# Patient Record
Sex: FEMALE | Race: WHITE | HISPANIC OR LATINO | Employment: UNEMPLOYED | URBAN - METROPOLITAN AREA
[De-identification: names, ages, dates, MRNs, and addresses within clinical notes are randomized per-mention and may not be internally consistent; named-entity substitution may affect disease eponyms.]

---

## 2017-01-12 DIAGNOSIS — E87.6 HYPOKALEMIA: ICD-10-CM

## 2017-01-12 DIAGNOSIS — I10 ESSENTIAL (PRIMARY) HYPERTENSION: ICD-10-CM

## 2017-01-12 DIAGNOSIS — D50.9 IRON DEFICIENCY ANEMIA: ICD-10-CM

## 2017-01-12 DIAGNOSIS — E11.9 TYPE 2 DIABETES MELLITUS WITHOUT COMPLICATIONS (HCC): ICD-10-CM

## 2017-01-20 ENCOUNTER — TRANSCRIBE ORDERS (OUTPATIENT)
Dept: ADMINISTRATIVE | Facility: HOSPITAL | Age: 52
End: 2017-01-20

## 2017-01-20 DIAGNOSIS — Z12.31 SCREENING MAMMOGRAM FOR HIGH-RISK PATIENT: Primary | ICD-10-CM

## 2017-01-28 ENCOUNTER — APPOINTMENT (OUTPATIENT)
Dept: LAB | Facility: CLINIC | Age: 52
End: 2017-01-28
Payer: COMMERCIAL

## 2017-01-28 DIAGNOSIS — D50.9 IRON DEFICIENCY ANEMIA: ICD-10-CM

## 2017-01-28 DIAGNOSIS — E11.9 TYPE 2 DIABETES MELLITUS WITHOUT COMPLICATIONS (HCC): ICD-10-CM

## 2017-01-28 DIAGNOSIS — E87.6 HYPOKALEMIA: ICD-10-CM

## 2017-01-28 DIAGNOSIS — E55.9 VITAMIN D DEFICIENCY: ICD-10-CM

## 2017-01-28 DIAGNOSIS — I10 ESSENTIAL (PRIMARY) HYPERTENSION: ICD-10-CM

## 2017-01-28 LAB
25(OH)D3 SERPL-MCNC: 25 NG/ML (ref 30–100)
ALBUMIN SERPL BCP-MCNC: 3.3 G/DL (ref 3.5–5)
ALP SERPL-CCNC: 70 U/L (ref 46–116)
ALT SERPL W P-5'-P-CCNC: 25 U/L (ref 12–78)
ANION GAP SERPL CALCULATED.3IONS-SCNC: 10 MMOL/L (ref 4–13)
AST SERPL W P-5'-P-CCNC: 14 U/L (ref 5–45)
BILIRUB SERPL-MCNC: 0.2 MG/DL (ref 0.2–1)
BUN SERPL-MCNC: 12 MG/DL (ref 5–25)
CALCIUM SERPL-MCNC: 8.8 MG/DL (ref 8.3–10.1)
CHLORIDE SERPL-SCNC: 101 MMOL/L (ref 100–108)
CO2 SERPL-SCNC: 27 MMOL/L (ref 21–32)
CREAT SERPL-MCNC: 0.61 MG/DL (ref 0.6–1.3)
CREAT UR-MCNC: 306 MG/DL
EST. AVERAGE GLUCOSE BLD GHB EST-MCNC: 160 MG/DL
GFR SERPL CREATININE-BSD FRML MDRD: >60 ML/MIN/1.73SQ M
GLUCOSE SERPL-MCNC: 140 MG/DL (ref 65–140)
HBA1C MFR BLD: 7.2 % (ref 4.2–6.3)
MICROALBUMIN UR-MCNC: 28.5 MG/L (ref 0–20)
MICROALBUMIN/CREAT 24H UR: 9 MG/G CREATININE (ref 0–30)
POTASSIUM SERPL-SCNC: 3.6 MMOL/L (ref 3.5–5.3)
PROT SERPL-MCNC: 7.2 G/DL (ref 6.4–8.2)
SODIUM SERPL-SCNC: 138 MMOL/L (ref 136–145)

## 2017-01-28 PROCEDURE — 82043 UR ALBUMIN QUANTITATIVE: CPT

## 2017-01-28 PROCEDURE — 36415 COLL VENOUS BLD VENIPUNCTURE: CPT

## 2017-01-28 PROCEDURE — 82570 ASSAY OF URINE CREATININE: CPT

## 2017-01-28 PROCEDURE — 82306 VITAMIN D 25 HYDROXY: CPT

## 2017-01-28 PROCEDURE — 80053 COMPREHEN METABOLIC PANEL: CPT

## 2017-01-28 PROCEDURE — 83036 HEMOGLOBIN GLYCOSYLATED A1C: CPT

## 2017-01-30 ENCOUNTER — HOSPITAL ENCOUNTER (OUTPATIENT)
Dept: RADIOLOGY | Age: 52
Discharge: HOME | End: 2017-01-30
Payer: COMMERCIAL

## 2017-01-30 DIAGNOSIS — Z12.31 SCREENING MAMMOGRAM FOR HIGH-RISK PATIENT: ICD-10-CM

## 2017-01-30 PROCEDURE — G0202 SCR MAMMO BI INCL CAD: HCPCS

## 2017-03-01 DIAGNOSIS — E11.9 TYPE 2 DIABETES MELLITUS WITHOUT COMPLICATIONS (HCC): ICD-10-CM

## 2017-03-01 DIAGNOSIS — D50.9 IRON DEFICIENCY ANEMIA: ICD-10-CM

## 2017-03-01 DIAGNOSIS — E87.6 HYPOKALEMIA: ICD-10-CM

## 2017-03-01 DIAGNOSIS — E55.9 VITAMIN D DEFICIENCY: ICD-10-CM

## 2017-03-01 DIAGNOSIS — I10 ESSENTIAL (PRIMARY) HYPERTENSION: ICD-10-CM

## 2017-03-02 ENCOUNTER — ALLSCRIPTS OFFICE VISIT (OUTPATIENT)
Dept: OTHER | Facility: OTHER | Age: 52
End: 2017-03-02

## 2017-04-27 ENCOUNTER — ALLSCRIPTS OFFICE VISIT (OUTPATIENT)
Dept: OTHER | Facility: OTHER | Age: 52
End: 2017-04-27

## 2017-06-01 DIAGNOSIS — E11.65 TYPE 2 DIABETES MELLITUS WITH HYPERGLYCEMIA (HCC): ICD-10-CM

## 2017-06-01 DIAGNOSIS — E55.9 VITAMIN D DEFICIENCY: ICD-10-CM

## 2017-06-01 DIAGNOSIS — E87.6 HYPOKALEMIA: ICD-10-CM

## 2017-06-01 DIAGNOSIS — I10 ESSENTIAL (PRIMARY) HYPERTENSION: ICD-10-CM

## 2017-06-01 DIAGNOSIS — D50.9 IRON DEFICIENCY ANEMIA: ICD-10-CM

## 2017-06-08 ENCOUNTER — ALLSCRIPTS OFFICE VISIT (OUTPATIENT)
Dept: OTHER | Facility: OTHER | Age: 52
End: 2017-06-08

## 2017-06-10 ENCOUNTER — APPOINTMENT (OUTPATIENT)
Dept: LAB | Facility: CLINIC | Age: 52
End: 2017-06-10
Payer: COMMERCIAL

## 2017-06-10 DIAGNOSIS — E11.65 TYPE 2 DIABETES MELLITUS WITH HYPERGLYCEMIA (HCC): ICD-10-CM

## 2017-06-10 DIAGNOSIS — E87.6 HYPOKALEMIA: ICD-10-CM

## 2017-06-10 DIAGNOSIS — I10 ESSENTIAL (PRIMARY) HYPERTENSION: ICD-10-CM

## 2017-06-10 DIAGNOSIS — D50.9 IRON DEFICIENCY ANEMIA: ICD-10-CM

## 2017-06-10 DIAGNOSIS — E55.9 VITAMIN D DEFICIENCY: ICD-10-CM

## 2017-06-10 LAB
25(OH)D3 SERPL-MCNC: 35 NG/ML (ref 30–100)
ALBUMIN SERPL BCP-MCNC: 3.5 G/DL (ref 3.5–5)
ALP SERPL-CCNC: 70 U/L (ref 46–116)
ALT SERPL W P-5'-P-CCNC: 41 U/L (ref 12–78)
ANION GAP SERPL CALCULATED.3IONS-SCNC: 7 MMOL/L (ref 4–13)
AST SERPL W P-5'-P-CCNC: 26 U/L (ref 5–45)
BASOPHILS # BLD AUTO: 0.02 THOUSANDS/ΜL (ref 0–0.1)
BASOPHILS NFR BLD AUTO: 0 % (ref 0–1)
BILIRUB SERPL-MCNC: 0.3 MG/DL (ref 0.2–1)
BUN SERPL-MCNC: 16 MG/DL (ref 5–25)
CALCIUM SERPL-MCNC: 8.9 MG/DL (ref 8.3–10.1)
CHLORIDE SERPL-SCNC: 102 MMOL/L (ref 100–108)
CO2 SERPL-SCNC: 30 MMOL/L (ref 21–32)
CREAT SERPL-MCNC: 0.7 MG/DL (ref 0.6–1.3)
EOSINOPHIL # BLD AUTO: 0.11 THOUSAND/ΜL (ref 0–0.61)
EOSINOPHIL NFR BLD AUTO: 2 % (ref 0–6)
ERYTHROCYTE [DISTWIDTH] IN BLOOD BY AUTOMATED COUNT: 16.3 % (ref 11.6–15.1)
EST. AVERAGE GLUCOSE BLD GHB EST-MCNC: 160 MG/DL
GFR SERPL CREATININE-BSD FRML MDRD: >60 ML/MIN/1.73SQ M
GLUCOSE P FAST SERPL-MCNC: 155 MG/DL (ref 65–99)
HBA1C MFR BLD: 7.2 % (ref 4.2–6.3)
HCT VFR BLD AUTO: 38.5 % (ref 34.8–46.1)
HGB BLD-MCNC: 12 G/DL (ref 11.5–15.4)
LYMPHOCYTES # BLD AUTO: 1.56 THOUSANDS/ΜL (ref 0.6–4.47)
LYMPHOCYTES NFR BLD AUTO: 26 % (ref 14–44)
MCH RBC QN AUTO: 25.5 PG (ref 26.8–34.3)
MCHC RBC AUTO-ENTMCNC: 31.2 G/DL (ref 31.4–37.4)
MCV RBC AUTO: 82 FL (ref 82–98)
MONOCYTES # BLD AUTO: 0.36 THOUSAND/ΜL (ref 0.17–1.22)
MONOCYTES NFR BLD AUTO: 6 % (ref 4–12)
NEUTROPHILS # BLD AUTO: 3.88 THOUSANDS/ΜL (ref 1.85–7.62)
NEUTS SEG NFR BLD AUTO: 66 % (ref 43–75)
PLATELET # BLD AUTO: 228 THOUSANDS/UL (ref 149–390)
PMV BLD AUTO: 10.8 FL (ref 8.9–12.7)
POTASSIUM SERPL-SCNC: 3.6 MMOL/L (ref 3.5–5.3)
PROT SERPL-MCNC: 7.3 G/DL (ref 6.4–8.2)
RBC # BLD AUTO: 4.7 MILLION/UL (ref 3.81–5.12)
SODIUM SERPL-SCNC: 139 MMOL/L (ref 136–145)
WBC # BLD AUTO: 5.93 THOUSAND/UL (ref 4.31–10.16)

## 2017-06-10 PROCEDURE — 85025 COMPLETE CBC W/AUTO DIFF WBC: CPT

## 2017-06-10 PROCEDURE — 80053 COMPREHEN METABOLIC PANEL: CPT

## 2017-06-10 PROCEDURE — 83036 HEMOGLOBIN GLYCOSYLATED A1C: CPT

## 2017-06-10 PROCEDURE — 82306 VITAMIN D 25 HYDROXY: CPT

## 2017-06-10 PROCEDURE — 36415 COLL VENOUS BLD VENIPUNCTURE: CPT

## 2017-07-10 ENCOUNTER — ALLSCRIPTS OFFICE VISIT (OUTPATIENT)
Dept: OTHER | Facility: OTHER | Age: 52
End: 2017-07-10

## 2017-10-09 DIAGNOSIS — I10 ESSENTIAL (PRIMARY) HYPERTENSION: ICD-10-CM

## 2017-10-09 DIAGNOSIS — E78.5 HYPERLIPIDEMIA: ICD-10-CM

## 2017-10-09 DIAGNOSIS — E11.65 TYPE 2 DIABETES MELLITUS WITH HYPERGLYCEMIA (HCC): ICD-10-CM

## 2017-10-13 ENCOUNTER — APPOINTMENT (OUTPATIENT)
Dept: LAB | Facility: CLINIC | Age: 52
End: 2017-10-13
Payer: COMMERCIAL

## 2017-10-13 ENCOUNTER — TRANSCRIBE ORDERS (OUTPATIENT)
Dept: LAB | Facility: CLINIC | Age: 52
End: 2017-10-13

## 2017-10-13 DIAGNOSIS — E78.5 HYPERLIPIDEMIA: ICD-10-CM

## 2017-10-13 DIAGNOSIS — E11.65 TYPE 2 DIABETES MELLITUS WITH HYPERGLYCEMIA (HCC): ICD-10-CM

## 2017-10-13 DIAGNOSIS — I10 ESSENTIAL (PRIMARY) HYPERTENSION: ICD-10-CM

## 2017-10-13 LAB
ANION GAP SERPL CALCULATED.3IONS-SCNC: 7 MMOL/L (ref 4–13)
BUN SERPL-MCNC: 16 MG/DL (ref 5–25)
CALCIUM SERPL-MCNC: 9.1 MG/DL (ref 8.3–10.1)
CHLORIDE SERPL-SCNC: 103 MMOL/L (ref 100–108)
CHOLEST SERPL-MCNC: 140 MG/DL (ref 50–200)
CO2 SERPL-SCNC: 29 MMOL/L (ref 21–32)
CREAT SERPL-MCNC: 0.71 MG/DL (ref 0.6–1.3)
EST. AVERAGE GLUCOSE BLD GHB EST-MCNC: 157 MG/DL
GFR SERPL CREATININE-BSD FRML MDRD: 98 ML/MIN/1.73SQ M
GLUCOSE P FAST SERPL-MCNC: 135 MG/DL (ref 65–99)
HBA1C MFR BLD: 7.1 % (ref 4.2–6.3)
HDLC SERPL-MCNC: 61 MG/DL (ref 40–60)
LDLC SERPL CALC-MCNC: 52 MG/DL (ref 0–100)
POTASSIUM SERPL-SCNC: 4.3 MMOL/L (ref 3.5–5.3)
SODIUM SERPL-SCNC: 139 MMOL/L (ref 136–145)
TRIGL SERPL-MCNC: 133 MG/DL

## 2017-10-13 PROCEDURE — 36415 COLL VENOUS BLD VENIPUNCTURE: CPT

## 2017-10-13 PROCEDURE — 80048 BASIC METABOLIC PNL TOTAL CA: CPT

## 2017-10-13 PROCEDURE — 80061 LIPID PANEL: CPT

## 2017-10-13 PROCEDURE — 83036 HEMOGLOBIN GLYCOSYLATED A1C: CPT

## 2017-11-02 ENCOUNTER — GENERIC CONVERSION - ENCOUNTER (OUTPATIENT)
Dept: OTHER | Facility: OTHER | Age: 52
End: 2017-11-02

## 2017-11-14 ENCOUNTER — GENERIC CONVERSION - ENCOUNTER (OUTPATIENT)
Dept: OTHER | Facility: OTHER | Age: 52
End: 2017-11-14

## 2017-11-20 ENCOUNTER — OFFICE VISIT (OUTPATIENT)
Dept: URGENT CARE | Age: 52
End: 2017-11-20
Payer: COMMERCIAL

## 2017-11-20 ENCOUNTER — APPOINTMENT (OUTPATIENT)
Dept: RADIOLOGY | Age: 52
End: 2017-11-20
Attending: FAMILY MEDICINE
Payer: COMMERCIAL

## 2017-11-20 ENCOUNTER — TRANSCRIBE ORDERS (OUTPATIENT)
Dept: ADMINISTRATIVE | Age: 52
End: 2017-11-20

## 2017-11-20 DIAGNOSIS — E11.9 TYPE 2 DIABETES MELLITUS WITHOUT COMPLICATIONS (HCC): ICD-10-CM

## 2017-11-20 DIAGNOSIS — M79.2 NEURALGIA AND NEURITIS, UNSPECIFIED (CODE): ICD-10-CM

## 2017-11-20 DIAGNOSIS — M25.561 PAIN IN RIGHT KNEE: ICD-10-CM

## 2017-11-20 PROCEDURE — 73562 X-RAY EXAM OF KNEE 3: CPT

## 2017-11-20 PROCEDURE — 72050 X-RAY EXAM NECK SPINE 4/5VWS: CPT

## 2017-11-20 PROCEDURE — 99213 OFFICE O/P EST LOW 20 MIN: CPT

## 2017-11-21 NOTE — PROGRESS NOTES
Assessment    1  Right knee pain (759 46) (M25 561)   2  Radicular pain of left upper extremity (729 2) (M79 2)    Plan  Radicular pain of left upper extremity    · * XR SPINE CERVICAL COMPLETE 4 OR 5 VW NON INJURY; Status:Active; Requestedfor:20Nov2017;   Right knee pain    · * XR KNEE 3 VW RIGHT NON INJURY; Status:Active; Requested for:20Nov2017;    · Ace Bandage; Status:Complete;   Done: 27JHE1038    Discussion/Summary  Discussion Summary:   Rest, limit activity ( stairs)  Ace wrap while awake  Aleve twice a day for pain and inflammation ( please take with food )  cyclobenzaprine for muscle tightness ( may cause sedation )  heat and ice as discussed  follow-up with family physician /orthopedics for further care ( physical therapy, and possibly further imaging )  [phone]                Understands and agrees with treatment plan: The treatment plan was reviewed with the patient/guardian  The patient/guardian understands and agrees with the treatment plan   Counseling Documentation With Imm: The patient was counseled regarding  Chief Complaint    1  Knee Pain  Chief Complaint Free Text Note Form: Pt C/O right knee pain and left arm pain  Denies injury, Tried ibuprofen and Flexeril without relief  No CP or radiation of pain from left arm  History of Present Illness  HPI: Patient with pain in right knee; also generalized discomfort of left arm - no known injury; patient is right-hand dominant   Hospital Based Practices Required Assessment:  Pain Assessment  the patient states they have pain  The pain is located in the right knee and left arm  The patient describes the pain as aching  (on a scale of 0 to 10, the patient rates the pain at 8 )  Abuse And Domestic Violence Screen   Yes, the patient is safe at home  -- The patient states no one is hurting them  Depression And Suicide Screen  No, the patient has not had thoughts of hurting themself  No, the patient has not felt depressed in the past 7 days  Review of Systems  Focused-Female:  Constitutional: as noted in HPI   ENT: no ear ache, no loss of hearing, no nosebleeds or nasal discharge, no sore throat or hoarseness  Cardiovascular: no complaints of slow or fast heart rate, no chest pain, no palpitations, no leg claudication or lower extremity edema  Respiratory: no complaints of shortness of breath, no wheezing, no dyspnea on exertion, no orthopnea or PND  Breasts: no complaints of breast pain, breast lump or nipple discharge  Gastrointestinal: no complaints of abdominal pain, no constipation, no nausea or diarrhea, no vomiting, no bloody stools  Genitourinary: no complaints of dysuria, no incontinence, no pelvic pain, no dysmenorrhea, no vaginal discharge or abnormal vaginal bleeding  Musculoskeletal: as noted in HPI  Integumentary: no complaints of skin rash or lesion, no itching or dry skin, no skin wounds  Neurological: no complaints of headache, no confusion, no numbness or tingling, no dizziness or fainting  ROS Reviewed:   ROS reviewed  Active Problems    1  Allergic rhinitis (477 9) (J30 9)   2  Cervical cancer screening (V76 2) (Z12 4)   3  Cervical paraspinal muscle spasm (728 85) (M62 838)   4  Diabetes mellitus type 2, uncontrolled (250 02) (E11 65)   5  Diverticulosis of colon (562 10) (K57 30)   6  Encounter for screening colonoscopy (V76 51) (Z12 11)   7  GERD without esophagitis (530 81) (K21 9)   8  Hyperlipidemia (272 4) (E78 5)   9  Hypertension (401 9) (I10)   10  Hypokalemia (276 8) (E87 6)   11  Iron deficiency anemia (280 9) (D50 9)   12  Lower back pain (724 2) (M54 5)   13  Microscopic hematuria (599 72) (R31 29)   14  Mild intermittent asthma without complication (609 61) (T38 94)   15  Morbid obesity (278 01) (E66 01)   16  Neck pain (723 1) (M54 2)   17  Neck stiffness (723 5) (M43 6)   18  Need for 23-polyvalent pneumococcal polysaccharide vaccine (V03 82) (Z23)   19   Need for immunization against influenza (V04 81) (Z23)   20  Need for Tdap vaccination (V06 1) (Z23)   21  Obesity (278 00) (E66 9)   22  KIM on CPAP (327 23,V46 8) (G47 33,Z99 89)   23  Segmental and somatic dysfunction of cervical region (739 1) (M99 01)   24  Segmental and somatic dysfunction of thoracic region (739 2) (M99 02)   25  Type 2 diabetes mellitus (250 00) (E11 9)   26  Visit for screening mammogram (V76 12) (Z12 31)   27  Vitamin D deficiency (268 9) (E55 9)    Past Medical History    1  History of Abdominal pain, RUQ (789 01) (R10 11)   2  History of Abdominal pain, suprapubic (789 09) (R10 2)   3  History of Acute foot pain, left (729 5) (M79 672)   4  History of Asthmatic bronchitis with exacerbation (493 92) (J45 901)   5  History of Encounter for screening mammogram for malignant neoplasm of breast (V76 12) (Z12 31)   6  History of acute bronchitis (V12 69) (Z87 09)   7  History of backache (V13 59) (Z87 39)   8  History of bronchitis (V12 69) (Z87 09)   9  History of headache (V13 89) (Z87 898)   10  History of viral gastroenteritis (V12 09) (Z86 19)   11  History of Hoarseness (784 42)   12  History of Impaired fasting glucose (790 21) (R73 01)   13  History of Nausea (787 02) (R11 0)   14  History of Neck Strain (847 0)  Active Problems And Past Medical History Reviewed: The active problems and past medical history were reviewed and updated today  Family History  Mother    1  Family history of Coronary Artery Disease (V17 49)  Father    2  Family history of Diabetes Mellitus (V18 0)   3  Family history of Ischemic Stroke (V17 1)  Brother    4  Family history of Diabetes Mellitus (V18 0)  Family History Reviewed: The family history was reviewed and updated today  Social History   · Denied: History of Alcohol   ·    · Never A Smoker   · Uses Safety Equipment - Seatbelts  Social History Reviewed: The social history was reviewed and updated today  The social history was reviewed and is unchanged        Surgical History    1  History of Hand Surgery   2  History of Thyroid Surgery   3  History of Thyroid Surgery Sub-Total Thyroidectomy   4  History of Tubal Ligation   5  History of Uterine Fibroid Embolization  Surgical History Reviewed: The surgical history was reviewed and updated today  Current Meds   1  Accu-Chek FastClix Lancets Miscellaneous; USE daily AS DIRECTED  e11 9; Therapy: 10ITQ9826 to (Evaluate:14Nov2016)  Requested for: 75OCN2025; Last Rx:20Nov2015 Ordered   2  Accu-Chek Mya SmartView w/Device Kit; QID  AS DIRECTED DX 92834; Therapy: 02SGU0978 to (Last MT:88DXY0889)  Requested for: 79DAZ5955 Ordered   3  Accu-Chek SmartView In Vitro Strip; TEST BLOOD SUGAR ONCE A DAY; Therapy: 23AGK0976 to (Evaluate:10Vjg8629)  Requested for: 50Inh8221; Last Rx:54Zvp2121 Ordered   4  Atorvastatin Calcium 40 MG Oral Tablet; Take 1 tablet by mouth at bedtime; Therapy: 58TPN8800 to (Evaluate:33Erq6258)  Requested for: 31IES6114; Last Rx:70Bvf6811 Ordered   5  Cyclobenzaprine HCl - 10 MG Oral Tablet; take 1 tablet by mouth three times a day if needed; Therapy: 19NWJ8567 to (Evaluate:41Tzk3473)  Requested for: 86JJQ5151; Last Rx:08Nov2017 Ordered   6  Ferrous Sulfate 325 (65 Fe) MG Oral Tablet; take one tablet by mouth every day; Therapy: 67ZJF4262 to (Evaluate:82Iqq9042)  Requested for: 67FIS7436; Last Rx:32Yxk8392 Ordered   7  HydroCHLOROthiazide 25 MG Oral Tablet; take 1 tablet by mouth once daily; Therapy: 98Zxh1283 to (Kelsey Nathan)  Requested for: 27SNE6937; Last Rx:01Jun2017 Ordered   8  Lisinopril 20 MG Oral Tablet; take 1 tablet by mouth once daily; Therapy: 93FEK0972 to (Evaluate:49Gau2498)  Requested for: 29Akb6911; Last Rx:19Bvu9654 Ordered   9  MetFORMIN HCl - 500 MG Oral Tablet; take 1 tablet by mouth twice a day with food; Therapy: 17XCI3955 to (Evaluate:19Jan2018)  Requested for: 96Atw0600; Last Rx:87Nnu9088 Ordered   10   Pantoprazole Sodium 40 MG Oral Tablet Delayed Release; take 1 tablet by mouth once  daily; Therapy: 60ADQ3182 to (Sheryn Epley)  Requested for: 25ISF0084; Last  Rx:11Osm2018 Ordered   11  Potassium Chloride Charity ER 20 MEQ Oral Tablet Extended Release; take 1 tablet by  mouth once daily; Therapy: 25BRM6047 to (Ana Craven)  Requested for: 49LTW7159; Last  Rx:08Nov2017 Ordered   12  Potassium Chloride ER 20 MEQ Oral Tablet Extended Release; Take 1 tablet daily; Therapy: 78WNG4244 to (Last Rx:02Mar2017)  Requested for: 59RGR3785 Ordered   13  Ventolin  (90 Base) MCG/ACT Inhalation Aerosol Solution; INHALE 1 TO 2 PUFFS  EVERY 6 HOURS AS NEEDED; Therapy: 80Lhy6662 to (Last Rx:83Itx3047)  Requested for: 89RRY3601 Ordered   14  Vitamin D (Ergocalciferol) 36169 UNIT Oral Capsule; TAKE 1 CAPSULE BY MOUTH  EVERY WEEK; Therapy: 91CKT2565 to (Evaluate:13Nov2017)  Requested for: 39Apq3197; Last  Rx:50Bdj7970 Ordered  Medication List Reviewed: The medication list was reviewed and updated today  Allergies  1  Levaquin TABS    2  No Known Environmental Allergies   3  No Known Food Allergies    Vitals  Signs   Recorded: 47HMA1350 01:09PM   Temperature: 97 8 F, Temporal  Heart Rate: 88  Respiration: 16  Systolic: 022, LUE, Sitting  Diastolic: 80, LUE, Sitting  Height: 5 ft 2 in  Weight: 208 lb   BMI Calculated: 38 04  BSA Calculated: 1 94  O2 Saturation: 96    Physical Exam   Constitutional  General appearance: No acute distress, well appearing and well nourished  Pulmonary  Respiratory effort: No increased work of breathing or signs of respiratory distress  Auscultation of lungs: Clear to auscultation  Cardiovascular  Auscultation of heart: Normal rate and rhythm, normal S1 and S2, without murmurs  Musculoskeletal generalized discomfort anterior aspect of the right knee; patient able to flex and extend her right knee; tightness of left upper trapezius musculature; decreased range of motion of the left shoulder  Results/Data  Diagnostic Studies Reviewed:  I personally reviewed the films/images/results in the office today  My interpretation follows  X-ray Review No acute fracture noted        Signatures   Electronically signed by : Zev Casas DO; Nov 20 2017  1:57PM EST                       (Author)

## 2017-11-27 ENCOUNTER — GENERIC CONVERSION - ENCOUNTER (OUTPATIENT)
Dept: OTHER | Facility: OTHER | Age: 52
End: 2017-11-27

## 2017-11-28 ENCOUNTER — APPOINTMENT (OUTPATIENT)
Dept: LAB | Facility: CLINIC | Age: 52
End: 2017-11-28
Payer: COMMERCIAL

## 2017-11-28 DIAGNOSIS — E11.9 TYPE 2 DIABETES MELLITUS WITHOUT COMPLICATIONS (HCC): ICD-10-CM

## 2017-11-28 DIAGNOSIS — M25.561 PAIN IN RIGHT KNEE: ICD-10-CM

## 2017-11-28 LAB — TSH SERPL DL<=0.05 MIU/L-ACNC: 2.96 UIU/ML (ref 0.36–3.74)

## 2017-11-28 PROCEDURE — 84443 ASSAY THYROID STIM HORMONE: CPT

## 2017-11-28 PROCEDURE — 86430 RHEUMATOID FACTOR TEST QUAL: CPT

## 2017-11-28 PROCEDURE — 36415 COLL VENOUS BLD VENIPUNCTURE: CPT

## 2017-11-28 PROCEDURE — 86038 ANTINUCLEAR ANTIBODIES: CPT

## 2017-11-29 LAB
RHEUMATOID FACT SER QL LA: NEGATIVE
RYE IGE QN: NEGATIVE

## 2017-12-22 ENCOUNTER — GENERIC CONVERSION - ENCOUNTER (OUTPATIENT)
Dept: OTHER | Facility: OTHER | Age: 52
End: 2017-12-22

## 2017-12-30 ENCOUNTER — APPOINTMENT (EMERGENCY)
Dept: RADIOLOGY | Facility: HOSPITAL | Age: 52
End: 2017-12-30
Payer: COMMERCIAL

## 2017-12-30 ENCOUNTER — HOSPITAL ENCOUNTER (EMERGENCY)
Facility: HOSPITAL | Age: 52
Discharge: HOME | End: 2017-12-30
Payer: COMMERCIAL

## 2017-12-30 ENCOUNTER — APPOINTMENT (EMERGENCY)
Dept: NON INVASIVE DIAGNOSTICS | Facility: HOSPITAL | Age: 52
End: 2017-12-30
Payer: COMMERCIAL

## 2017-12-30 VITALS
HEART RATE: 85 BPM | RESPIRATION RATE: 18 BRPM | OXYGEN SATURATION: 97 % | BODY MASS INDEX: 37.49 KG/M2 | WEIGHT: 205 LBS | SYSTOLIC BLOOD PRESSURE: 122 MMHG | TEMPERATURE: 98.8 F | DIASTOLIC BLOOD PRESSURE: 62 MMHG

## 2017-12-30 DIAGNOSIS — L03.90 CELLULITIS: Primary | ICD-10-CM

## 2017-12-30 LAB
ANION GAP SERPL CALCULATED.3IONS-SCNC: 9 MMOL/L (ref 4–13)
BASOPHILS # BLD AUTO: 0.01 THOUSANDS/ΜL (ref 0–0.1)
BASOPHILS NFR BLD AUTO: 0 % (ref 0–1)
BUN SERPL-MCNC: 18 MG/DL (ref 5–25)
CALCIUM SERPL-MCNC: 9.2 MG/DL (ref 8.3–10.1)
CHLORIDE SERPL-SCNC: 99 MMOL/L (ref 100–108)
CO2 SERPL-SCNC: 27 MMOL/L (ref 21–32)
CREAT SERPL-MCNC: 0.78 MG/DL (ref 0.6–1.3)
CRP SERPL QL: 68.3 MG/L
EOSINOPHIL # BLD AUTO: 0.1 THOUSAND/ΜL (ref 0–0.61)
EOSINOPHIL NFR BLD AUTO: 1 % (ref 0–6)
ERYTHROCYTE [DISTWIDTH] IN BLOOD BY AUTOMATED COUNT: 17.6 % (ref 11.6–15.1)
ERYTHROCYTE [SEDIMENTATION RATE] IN BLOOD: 67 MM/HOUR (ref 0–20)
GFR SERPL CREATININE-BSD FRML MDRD: 87 ML/MIN/1.73SQ M
GLUCOSE SERPL-MCNC: 159 MG/DL (ref 65–140)
HCT VFR BLD AUTO: 36.2 % (ref 34.8–46.1)
HGB BLD-MCNC: 11.5 G/DL (ref 11.5–15.4)
LYMPHOCYTES # BLD AUTO: 0.94 THOUSANDS/ΜL (ref 0.6–4.47)
LYMPHOCYTES NFR BLD AUTO: 11 % (ref 14–44)
MCH RBC QN AUTO: 24.5 PG (ref 26.8–34.3)
MCHC RBC AUTO-ENTMCNC: 31.8 G/DL (ref 31.4–37.4)
MCV RBC AUTO: 77 FL (ref 82–98)
MONOCYTES # BLD AUTO: 0.66 THOUSAND/ΜL (ref 0.17–1.22)
MONOCYTES NFR BLD AUTO: 8 % (ref 4–12)
NEUTROPHILS # BLD AUTO: 6.68 THOUSANDS/ΜL (ref 1.85–7.62)
NEUTS SEG NFR BLD AUTO: 80 % (ref 43–75)
NRBC BLD AUTO-RTO: 0 /100 WBCS
PLATELET # BLD AUTO: 259 THOUSANDS/UL (ref 149–390)
PMV BLD AUTO: 10.1 FL (ref 8.9–12.7)
POTASSIUM SERPL-SCNC: 3.5 MMOL/L (ref 3.5–5.3)
RBC # BLD AUTO: 4.69 MILLION/UL (ref 3.81–5.12)
SODIUM SERPL-SCNC: 135 MMOL/L (ref 136–145)
WBC # BLD AUTO: 8.42 THOUSAND/UL (ref 4.31–10.16)

## 2017-12-30 PROCEDURE — 85025 COMPLETE CBC W/AUTO DIFF WBC: CPT | Performed by: PHYSICIAN ASSISTANT

## 2017-12-30 PROCEDURE — 96374 THER/PROPH/DIAG INJ IV PUSH: CPT

## 2017-12-30 PROCEDURE — 36415 COLL VENOUS BLD VENIPUNCTURE: CPT | Performed by: PHYSICIAN ASSISTANT

## 2017-12-30 PROCEDURE — 73610 X-RAY EXAM OF ANKLE: CPT

## 2017-12-30 PROCEDURE — 80048 BASIC METABOLIC PNL TOTAL CA: CPT | Performed by: PHYSICIAN ASSISTANT

## 2017-12-30 PROCEDURE — 86140 C-REACTIVE PROTEIN: CPT | Performed by: PHYSICIAN ASSISTANT

## 2017-12-30 PROCEDURE — 99284 EMERGENCY DEPT VISIT MOD MDM: CPT

## 2017-12-30 PROCEDURE — 85652 RBC SED RATE AUTOMATED: CPT | Performed by: PHYSICIAN ASSISTANT

## 2017-12-30 PROCEDURE — 93971 EXTREMITY STUDY: CPT

## 2017-12-30 RX ORDER — HYDROCODONE BITARTRATE AND ACETAMINOPHEN 5; 325 MG/1; MG/1
1 TABLET ORAL EVERY 6 HOURS PRN
Qty: 5 TABLET | Refills: 0 | Status: SHIPPED | OUTPATIENT
Start: 2017-12-30 | End: 2018-01-09

## 2017-12-30 RX ORDER — KETOROLAC TROMETHAMINE 30 MG/ML
15 INJECTION, SOLUTION INTRAMUSCULAR; INTRAVENOUS ONCE
Status: COMPLETED | OUTPATIENT
Start: 2017-12-30 | End: 2017-12-30

## 2017-12-30 RX ORDER — SULFAMETHOXAZOLE AND TRIMETHOPRIM 800; 160 MG/1; MG/1
1 TABLET ORAL ONCE
Status: COMPLETED | OUTPATIENT
Start: 2017-12-30 | End: 2017-12-30

## 2017-12-30 RX ORDER — CEPHALEXIN 250 MG/1
500 CAPSULE ORAL ONCE
Status: COMPLETED | OUTPATIENT
Start: 2017-12-30 | End: 2017-12-30

## 2017-12-30 RX ORDER — SULFAMETHOXAZOLE AND TRIMETHOPRIM 800; 160 MG/1; MG/1
1 TABLET ORAL EVERY 12 HOURS SCHEDULED
Qty: 14 TABLET | Refills: 0 | Status: SHIPPED | OUTPATIENT
Start: 2017-12-30 | End: 2018-01-06

## 2017-12-30 RX ORDER — CEPHALEXIN 500 MG/1
500 CAPSULE ORAL 4 TIMES DAILY
Qty: 28 CAPSULE | Refills: 0 | Status: SHIPPED | OUTPATIENT
Start: 2017-12-30 | End: 2018-01-06

## 2017-12-30 RX ADMIN — CEPHALEXIN 500 MG: 250 CAPSULE ORAL at 15:27

## 2017-12-30 RX ADMIN — SULFAMETHOXAZOLE AND TRIMETHOPRIM 1 TABLET: 800; 160 TABLET ORAL at 15:27

## 2017-12-30 RX ADMIN — KETOROLAC TROMETHAMINE 15 MG: 30 INJECTION, SOLUTION INTRAMUSCULAR at 15:28

## 2017-12-30 NOTE — DISCHARGE INSTRUCTIONS
Cellulitis, Ambulatory Care   GENERAL INFORMATION:   Cellulitis  is a skin infection caused by bacteria  Common symptoms include the following:   · Fever    · A red, warm, swollen area on your skin    · Pain when the area is touched    · Bumps or blisters (abscess) that may drain pus    · Bumpy, raised skin that feels like an orange peel  Seek immediate care for the following symptoms:   · An increase in pain, redness, warmth, and size    · Red streaks coming from the infected area    · A thin, gray-brown discharge coming from your infected skin area    · A crackling under your skin when you touch it    · Purple dots or bumps on your skin, or bleeding under your skin    · New swelling and pain in your legs    · Sudden trouble breathing or chest pain  Treatment for cellulitis  may include medicines to treat the bacterial infection or decrease pain  The infection may need to be cleaned out  Damaged, dead, or infected tissue may need to be cut away to help your wound heal   Manage your symptoms:   · Elevate your wound above the level of your heart  as often as you can  This will help decrease swelling and pain  Prop your wound on pillows or blankets to keep it elevated comfortably  · Clean your wound as directed  You may need to wash the wound with soap and water  Look for signs of infection  · Wear pressure stockings as directed  The stockings are tight and put pressure on your legs  This improves blood flow and decreases swelling  Prevent cellulitis:   · Wash your hands often  Use soap and water  Wash your hands after you use the bathroom, change a child's diapers, or sneeze  Wash your hands before you prepare or eat food  Use lotion to prevent dry, cracked skin  · Do not share personal items, such as towels, clothing, and razors  · Clean exercise equipment  with germ-killing  before and after you use it    Follow up with your healthcare provider as directed:  Write down your questions so you remember to ask them during your visits  CARE AGREEMENT:   You have the right to help plan your care  Learn about your health condition and how it may be treated  Discuss treatment options with your caregivers to decide what care you want to receive  You always have the right to refuse treatment  The above information is an  only  It is not intended as medical advice for individual conditions or treatments  Talk to your doctor, nurse or pharmacist before following any medical regimen to see if it is safe and effective for you  © 2014 [address] is for End User's use only and may not be sold, redistributed or otherwise used for commercial purposes  All illustrations and images included in CareNotes® are the copyrighted property of [employer]  or Ata Garner

## 2017-12-30 NOTE — ED PROVIDER NOTES
History  Chief Complaint   Patient presents with    Knee Pain     Patient reports R ankle pain and L knee pain  States "my ankle is swollen and red, and I think I have bursitis in the L knee"     This is a 40-year-old female with a history of hypertension, hyper lipidemia, GERD, presents for evaluation of right ankle pain and swelling worsening for the past day  Patient reports that she has been having redness over her ankle for the past 3 weeks, however she noticed it increase yesterday morning over the lateral aspect of her foot  Patient states that she was seen by Orthopedics for a bursitis of the right knee and had a steroid injection in her knee for the 1st time about a month ago  Patient reports she started noticing the swelling and redness over the right ankle a few days later  States that it is painful to touch  Patient reports taking Motrin with minimal relief  States that she has left-sided knee pain as well, which she states is likely from compromising due to right leg pain  Denies any injuries or falls to the area  She denies any fever, chills, nausea, vomiting  Patient reports she is still able to ambulate  Denies having a history of PE or DVT, recent travel history, or exogenous estrogen  Prior to Admission Medications   Prescriptions Last Dose Informant Patient Reported? Taking? LISINOPRIL PO   Yes Yes   Sig: Take by mouth daily Indications: dosage unknown to pt    atorvastatin (LIPITOR) 40 mg tablet   Yes Yes   Sig: Take 40 mg by mouth daily  hydrochlorothiazide (HYDRODIURIL) 25 mg tablet   Yes Yes   Sig: Take 25 mg by mouth daily  pantoprazole (PROTONIX) 40 mg tablet   Yes Yes   Sig: Take 40 mg by mouth daily        Facility-Administered Medications: None       Past Medical History:   Diagnosis Date    GERD (gastroesophageal reflux disease)     Hyperlipidemia     Hypertension        Past Surgical History:   Procedure Laterality Date    OTHER SURGICAL HISTORY circloge    THYROIDECTOMY, PARTIAL      TUMOR REMOVAL      right wrist       History reviewed  No pertinent family history  I have reviewed and agree with the history as documented  Social History   Substance Use Topics    Smoking status: Never Smoker    Smokeless tobacco: Never Used    Alcohol use No        Review of Systems   Constitutional: Negative for chills and fever  Respiratory: Negative for chest tightness and shortness of breath  Cardiovascular: Negative for chest pain  Gastrointestinal: Negative for abdominal pain, nausea and vomiting  Musculoskeletal: Positive for arthralgias, joint swelling and myalgias  Skin: Positive for color change  Neurological: Negative for weakness and numbness  Physical Exam  ED Triage Vitals [12/30/17 1109]   Temperature Pulse Respirations Blood Pressure SpO2   98 8 °F (37 1 °C) 85 18 122/62 97 %      Temp Source Heart Rate Source Patient Position - Orthostatic VS BP Location FiO2 (%)   Oral Monitor Sitting Left arm --      Pain Score       8           Orthostatic Vital Signs  Vitals:    12/30/17 1109   BP: 122/62   Pulse: 85   Patient Position - Orthostatic VS: Sitting       Physical Exam   Constitutional: She is oriented to person, place, and time  Vital signs are normal  She appears well-developed and well-nourished  No distress  Pulmonary/Chest: Effort normal and breath sounds normal    Musculoskeletal: She exhibits tenderness  She exhibits no edema or deformity  Right ankle: She exhibits decreased range of motion and swelling  She exhibits no ecchymosis, no deformity, no laceration and normal pulse  Tenderness  Legs:       Feet:    Full active ROM, decreased strength in right ankle  5/5 strength in knees b/l  NVI  Right DP pulse 2+ in right    Neurological: She is alert and oriented to person, place, and time  Skin: Skin is warm  Capillary refill takes less than 2 seconds  No rash noted  She is not diaphoretic   There is erythema  Psychiatric: She has a normal mood and affect  Vitals reviewed  ED Medications  Medications - No data to display    Diagnostic Studies  Results Reviewed     Procedure Component Value Units Date/Time    Sedimentation rate, automated [[id number]]  (Abnormal) Collected:  12/30/17 1328    Lab Status:  Final result Specimen:  Blood from Arm, Right Updated:  12/30/17 1428     Sed Rate 67 (H) mm/hour     Basic metabolic panel [[id number]]  (Abnormal) Collected:  12/30/17 1328    Lab Status:  Final result Specimen:  Blood from Arm, Right Updated:  12/30/17 1358     Sodium 135 (L) mmol/L      Potassium 3 5 mmol/L      Chloride 99 (L) mmol/L      CO2 27 mmol/L      Anion Gap 9 mmol/L      BUN 18 mg/dL      Creatinine 0 78 mg/dL      Glucose 159 (H) mg/dL      Calcium 9 2 mg/dL      eGFR 87 ml/min/1 73sq m     Narrative:         National Kidney Disease Education Program recommendations are as follows:  GFR calculation is accurate only with a steady state creatinine  Chronic Kidney disease less than 60 ml/min/1 73 sq  meters  Kidney failure less than 15 ml/min/1 73 sq  meters      C-reactive protein [[id number]]  (Abnormal) Collected:  12/30/17 1328    Lab Status:  Final result Specimen:  Blood from Arm, Right Updated:  12/30/17 1358     CRP 68 3 (H) mg/L     CBC and differential [[id number]]  (Abnormal) Collected:  12/30/17 1328    Lab Status:  Final result Specimen:  Blood from Arm, Right Updated:  12/30/17 1353     WBC 8 42 Thousand/uL      RBC 4 69 Million/uL      Hemoglobin 11 5 g/dL      Hematocrit 36 2 %      MCV 77 (L) fL      MCH 24 5 (L) pg      MCHC 31 8 g/dL      RDW 17 6 (H) %      MPV 10 1 fL      Platelets 713 Thousands/uL      nRBC 0 /100 WBCs      Neutrophils Relative 80 (H) %      Lymphocytes Relative 11 (L) %      Monocytes Relative 8 %      Eosinophils Relative 1 %      Basophils Relative 0 %      Neutrophils Absolute 6 68 Thousands/µL      Lymphocytes Absolute 0 94 Thousands/µL      Monocytes Absolute 0 66 Thousand/µL      Eosinophils Absolute 0 10 Thousand/µL      Basophils Absolute 0 01 Thousands/µL     Synovial fluid, Culture and Gram stain [[id number]]     Lab Status:  No result Specimen: Body Fluid     Synovial fluid, white cell count w/ diff [[id number]]     Lab Status:  No result Specimen:  Synovial Fluid     Synovial fluid, crystal [[id number]]     Lab Status:  No result Specimen:  Synovial Fluid     Synovial fluid, RBC count [[id number]]     Lab Status:  No result Specimen:  Synovial Fluid                  XR ankle 3+ views RIGHT   ED Interpretation by Eugenio Gao PA-C (12/30 1322)   No fracture noted  VAS lower limb venous duplex study, unilateral/limited    (Results Pending)              Procedures  Procedures       Phone Contacts  ED Phone Contact    ED Course  ED Course as of Dec 30 1458   Sat Dec 30, 2017   1445 Negative for DVT, call from vascular                                MDM  Number of Diagnoses or Management Options  Diagnosis management comments: 53-year-old female presents for evaluation of right ankle pain and redness for the past 3 weeks  Patient states that worsened last night  States that she has also been having bilateral knee pain  Pt is noted to have erythema, warmth and swelling over the right ankle  Vascular study done to rule out DVT which is negative  Will have a resident tap the joint to remove fluid  CritCare Time    Disposition  Final diagnoses:   None     ED Disposition     None      Follow-up Information    None       Patient's Medications   Discharge Prescriptions    No medications on file     No discharge procedures on file      ED Provider  Electronically Signed by           Eugenio Gao PA-C  12/30/17 0621

## 2018-01-08 ENCOUNTER — GENERIC CONVERSION - ENCOUNTER (OUTPATIENT)
Dept: FAMILY MEDICINE CLINIC | Facility: CLINIC | Age: 53
End: 2018-01-08

## 2018-01-08 ENCOUNTER — GENERIC CONVERSION - ENCOUNTER (OUTPATIENT)
Dept: OTHER | Facility: OTHER | Age: 53
End: 2018-01-08

## 2018-01-08 ENCOUNTER — TRANSCRIBE ORDERS (OUTPATIENT)
Dept: ADMINISTRATIVE | Facility: HOSPITAL | Age: 53
End: 2018-01-08

## 2018-01-08 ENCOUNTER — APPOINTMENT (OUTPATIENT)
Dept: LAB | Facility: MEDICAL CENTER | Age: 53
End: 2018-01-08
Payer: COMMERCIAL

## 2018-01-08 DIAGNOSIS — L02.519 CELLULITIS AND ABSCESS OF HAND, EXCEPT FINGERS AND THUMB: Primary | ICD-10-CM

## 2018-01-08 DIAGNOSIS — L03.119 CELLULITIS AND ABSCESS OF HAND, EXCEPT FINGERS AND THUMB: Primary | ICD-10-CM

## 2018-01-08 DIAGNOSIS — M25.571 CHRONIC PAIN OF RIGHT ANKLE: Primary | ICD-10-CM

## 2018-01-08 DIAGNOSIS — L03.119 CELLULITIS AND ABSCESS OF HAND, EXCEPT FINGERS AND THUMB: ICD-10-CM

## 2018-01-08 DIAGNOSIS — G89.29 CHRONIC PAIN OF RIGHT ANKLE: Primary | ICD-10-CM

## 2018-01-08 DIAGNOSIS — L02.519 CELLULITIS AND ABSCESS OF HAND, EXCEPT FINGERS AND THUMB: ICD-10-CM

## 2018-01-08 LAB
BASOPHILS # BLD AUTO: 0.02 THOUSANDS/ΜL (ref 0–0.1)
BASOPHILS NFR BLD AUTO: 0 % (ref 0–1)
CRP SERPL QL: 34.7 MG/L
EOSINOPHIL # BLD AUTO: 0.12 THOUSAND/ΜL (ref 0–0.61)
EOSINOPHIL NFR BLD AUTO: 3 % (ref 0–6)
ERYTHROCYTE [DISTWIDTH] IN BLOOD BY AUTOMATED COUNT: 17.3 % (ref 11.6–15.1)
ERYTHROCYTE [SEDIMENTATION RATE] IN BLOOD: 66 MM/HOUR (ref 0–20)
HCT VFR BLD AUTO: 34.5 % (ref 34.8–46.1)
HGB BLD-MCNC: 10.8 G/DL (ref 11.5–15.4)
LYMPHOCYTES # BLD AUTO: 0.85 THOUSANDS/ΜL (ref 0.6–4.47)
LYMPHOCYTES NFR BLD AUTO: 18 % (ref 14–44)
MCH RBC QN AUTO: 24.3 PG (ref 26.8–34.3)
MCHC RBC AUTO-ENTMCNC: 31.3 G/DL (ref 31.4–37.4)
MCV RBC AUTO: 78 FL (ref 82–98)
MONOCYTES # BLD AUTO: 0.34 THOUSAND/ΜL (ref 0.17–1.22)
MONOCYTES NFR BLD AUTO: 7 % (ref 4–12)
NEUTROPHILS # BLD AUTO: 3.45 THOUSANDS/ΜL (ref 1.85–7.62)
NEUTS SEG NFR BLD AUTO: 72 % (ref 43–75)
NRBC BLD AUTO-RTO: 0 /100 WBCS
PLATELET # BLD AUTO: 310 THOUSANDS/UL (ref 149–390)
PMV BLD AUTO: 10.1 FL (ref 8.9–12.7)
RBC # BLD AUTO: 4.44 MILLION/UL (ref 3.81–5.12)
URATE SERPL-MCNC: 4.8 MG/DL (ref 2–6.8)
WBC # BLD AUTO: 4.79 THOUSAND/UL (ref 4.31–10.16)

## 2018-01-08 PROCEDURE — 86140 C-REACTIVE PROTEIN: CPT

## 2018-01-08 PROCEDURE — 85025 COMPLETE CBC W/AUTO DIFF WBC: CPT

## 2018-01-08 PROCEDURE — 85652 RBC SED RATE AUTOMATED: CPT

## 2018-01-08 PROCEDURE — 84550 ASSAY OF BLOOD/URIC ACID: CPT

## 2018-01-08 PROCEDURE — 36415 COLL VENOUS BLD VENIPUNCTURE: CPT

## 2018-01-10 ENCOUNTER — GENERIC CONVERSION - ENCOUNTER (OUTPATIENT)
Dept: OTHER | Facility: OTHER | Age: 53
End: 2018-01-10

## 2018-01-10 ENCOUNTER — HOSPITAL ENCOUNTER (OUTPATIENT)
Dept: RADIOLOGY | Age: 53
Discharge: HOME | End: 2018-01-10
Payer: COMMERCIAL

## 2018-01-10 DIAGNOSIS — M25.571 CHRONIC PAIN OF RIGHT ANKLE: ICD-10-CM

## 2018-01-10 DIAGNOSIS — G89.29 CHRONIC PAIN OF RIGHT ANKLE: ICD-10-CM

## 2018-01-10 PROCEDURE — 73721 MRI JNT OF LWR EXTRE W/O DYE: CPT

## 2018-01-13 VITALS
HEART RATE: 78 BPM | HEIGHT: 62 IN | WEIGHT: 212.25 LBS | BODY MASS INDEX: 39.06 KG/M2 | RESPIRATION RATE: 16 BRPM | SYSTOLIC BLOOD PRESSURE: 118 MMHG | TEMPERATURE: 97.7 F | DIASTOLIC BLOOD PRESSURE: 62 MMHG

## 2018-01-14 VITALS
WEIGHT: 213.38 LBS | HEART RATE: 64 BPM | TEMPERATURE: 96.6 F | BODY MASS INDEX: 39.27 KG/M2 | DIASTOLIC BLOOD PRESSURE: 84 MMHG | HEIGHT: 62 IN | RESPIRATION RATE: 16 BRPM | SYSTOLIC BLOOD PRESSURE: 124 MMHG

## 2018-01-15 VITALS
DIASTOLIC BLOOD PRESSURE: 80 MMHG | SYSTOLIC BLOOD PRESSURE: 126 MMHG | BODY MASS INDEX: 39.2 KG/M2 | HEART RATE: 70 BPM | RESPIRATION RATE: 16 BRPM | TEMPERATURE: 96.1 F | WEIGHT: 213 LBS | HEIGHT: 62 IN

## 2018-01-15 NOTE — MISCELLANEOUS
Provider Comments  Provider Comments:   PT NO SHOWED TODAY      Signatures   Electronically signed by : Barbara Argueta, ; Apr 11 2016  2:38PM EST                       (Author)    Electronically signed by : Saida Fermin, ; Apr 11 2016  3:47PM EST                       (Author)    Electronically signed by :  Rich Melchor MD; Apr 11 2016  4:55PM EST                       (Author)

## 2018-01-16 NOTE — MISCELLANEOUS
Message  returned p c  to pharmacist, Meryle Armour at [facility] in regard to pts' metformin- pt has not been seen here, but was seen at [facility] practice - that number was given to pharmacy      Active Problems    1  Allergic rhinitis (477 9) (J30 9)   2  Anemia (285 9) (D64 9)   3  Asthma (493 90) (J45 909)   4  Bronchitis (490) (J40)   5  Diverticulosis of colon (562 10) (K57 30)   6  GERD without esophagitis (530 81) (K21 9)   7  Hyperlipidemia (272 4) (E78 5)   8  Hyperlipidemia (272 4) (E78 5)   9  Hypertension (401 9) (I10)   10  Iron deficiency anemia (280 9) (D50 9)   11  Lower back pain (724 2) (M54 5)   12  Microscopic hematuria (599 72) (R31 2)   13  Nausea (787 02) (R11 0)   14  Neck pain (723 1) (M54 2)   15  Neck Strain (847 0)   16  Need for 23-polyvalent pneumococcal polysaccharide vaccine (V03 82) (Z23)   17  Obesity (278 00) (E66 9)   18  KIM on CPAP (327 23) (G47 33)   19  Type 2 diabetes mellitus (250 00) (E11 9)   20  Vitamin D deficiency (268 9) (E55 9)    Current Meds   1  Accu-Chek FastClix Lancets Miscellaneous; USE daily AS DIRECTED  e11 9; Therapy: 54TLE5703 to (Evaluate:14Nov2016)  Requested for: 52RXW7175; Last   Rx:20Nov2015 Ordered   2  Accu-Chek Mya SmartView w/Device Kit; QID  AS DIRECTED DX 03467; Therapy: 59HHG5181 to (Last MA:11XXZ5472)  Requested for: 81RQA4175 Ordered   3  Accu-Chek SmartView In Vitro Strip; TEST ONCE DAILY  e11 9; Therapy: 59CEB4604 to (Evaluate:14Nov2016)  Requested for: 10XGS8520; Last   Rx:20Nov2015 Ordered   4  Atorvastatin Calcium 40 MG Oral Tablet; Take 1 tablet by mouth at bedtime; Therapy: 22IEZ7911 to (XQPFUBEF:28WFP7439)  Requested for: 18XII6535; Last   Rx:29Jan2016 Ordered   5  Bromfed DM 30-2-10 MG/5ML Oral Syrup; TAKE 5 ML EVERY 4 TO 6 HOURS AS   NEEDED; Therapy: 30ZZM0114 to (Evaluate:11Jan2016)  Requested for: 59LHS9379; Last   Rx:07Jan2016 Ordered   6  Ferrous Sulfate 325 (65 Fe) MG Oral Tablet;  Take 1 tablet twice daily; Therapy: 78CFY9016 to (Monse Dorsey)  Requested for: 94Hvu4942; Last   Rx:94Dva2800 Ordered   7  Guaifenesin-Codeine 100-10 MG/5ML Oral Syrup; TAKE 5 ML DAILY AT BEDTIME; Therapy: 18TYI0557 to (Evaluate:10Jan2016); Last DH:25XYL9980 Ordered   8  Hydrochlorothiazide 25 MG Oral Tablet; take 1 tablet by mouth once daily; Therapy: 55Upj9844 to (Evaluate:23Jan2017)  Requested for: 75YOA2931; Last   Rx:29Jan2016 Ordered   9  Lisinopril 20 MG Oral Tablet; TAKE 1 TABLET DAILY; Therapy: 81QJM6448 to (CIVAGWOS:87DTV9068)  Requested for: 06LNR7997; Last   Rx:29Jan2016 Ordered   10  MetFORMIN HCl - 500 MG Oral Tablet; Start with one pill 500 mg by mouth twice a day  Increase by one pill 500 mg every week  2 total of 1000 mg by mouth twice a day; Therapy: 80PDT3593 to (Evaluate:52Jfu9186)  Requested for: 72Czo2883; Last    Rx:44Qbn7044 Ordered   11  Pantoprazole Sodium 40 MG Oral Tablet Delayed Release; take 1 tablet by mouth once    daily; Therapy: 17XOR6625 to (Tariq Brantley)  Requested for: 23XAP6203; Last    Rx:28Ayl7681 Ordered   12  ProAir  (90 Base) MCG/ACT Inhalation Aerosol Solution; Inhale two puffs every    4-6 hours as needed; Therapy: 06MLC2965 to (Last UB:43IOA9631)  Requested for: 60OFQ1913 Ordered   13  Ventolin  (90 Base) MCG/ACT Inhalation Aerosol Solution; INHALE 1 TO 2    PUFFS EVERY 6 HOURS AS NEEDED; Therapy: 77Eer8485 to (Last Rx:20Cyy0434)  Requested for: 43NPI0513 Ordered    Allergies    1  Levaquin TABS    2  No Known Environmental Allergies   3   No Known Food Allergies    Signatures   Electronically signed by : Chela Conti RN; Feb 4 2016 11:53AM EST                       (Author)

## 2018-01-17 ENCOUNTER — LAB (OUTPATIENT)
Dept: LAB | Facility: CLINIC | Age: 53
End: 2018-01-17
Payer: COMMERCIAL

## 2018-01-17 ENCOUNTER — TRANSCRIBE ORDERS (OUTPATIENT)
Dept: LAB | Facility: CLINIC | Age: 53
End: 2018-01-17

## 2018-01-17 ENCOUNTER — LAB CONVERSION - ENCOUNTER (OUTPATIENT)
Dept: OTHER | Facility: OTHER | Age: 53
End: 2018-01-17

## 2018-01-17 DIAGNOSIS — R21 RASH AND OTHER NONSPECIFIC SKIN ERUPTION: ICD-10-CM

## 2018-01-17 DIAGNOSIS — M25.571 RIGHT ANKLE PAIN, UNSPECIFIED CHRONICITY: ICD-10-CM

## 2018-01-17 DIAGNOSIS — M25.571 RIGHT ANKLE PAIN, UNSPECIFIED CHRONICITY: Primary | ICD-10-CM

## 2018-01-17 LAB
ALBUMIN SERPL BCP-MCNC: 3.6 G/DL (ref 3.5–5)
ALP SERPL-CCNC: 86 U/L (ref 46–116)
ALT SERPL W P-5'-P-CCNC: 43 U/L (ref 12–78)
ANION GAP SERPL CALCULATED.3IONS-SCNC: 9 MMOL/L (ref 4–13)
AST SERPL W P-5'-P-CCNC: 29 U/L (ref 5–45)
BASOPHILS # BLD AUTO: 0.03 THOUSANDS/ΜL (ref 0–0.1)
BASOPHILS NFR BLD AUTO: 1 % (ref 0–1)
BILIRUB SERPL-MCNC: 0.2 MG/DL (ref 0.2–1)
BUN SERPL-MCNC: 20 MG/DL (ref 5–25)
CALCIUM SERPL-MCNC: 9.5 MG/DL (ref 8.3–10.1)
CHLORIDE SERPL-SCNC: 102 MMOL/L (ref 100–108)
CO2 SERPL-SCNC: 28 MMOL/L (ref 21–32)
CREAT SERPL-MCNC: 0.85 MG/DL (ref 0.6–1.3)
CRP SERPL QL: 9.8 MG/L
EOSINOPHIL # BLD AUTO: 0.17 THOUSAND/ΜL (ref 0–0.61)
EOSINOPHIL NFR BLD AUTO: 3 % (ref 0–6)
ERYTHROCYTE [DISTWIDTH] IN BLOOD BY AUTOMATED COUNT: 17.6 % (ref 11.6–15.1)
ERYTHROCYTE [SEDIMENTATION RATE] IN BLOOD: 40 MM/HOUR (ref 0–20)
GFR SERPL CREATININE-BSD FRML MDRD: 78 ML/MIN/1.73SQ M
GLUCOSE SERPL-MCNC: 162 MG/DL (ref 65–140)
HCT VFR BLD AUTO: 34.4 % (ref 34.8–46.1)
HGB BLD-MCNC: 10.7 G/DL (ref 11.5–15.4)
LYMPHOCYTES # BLD AUTO: 0.89 THOUSANDS/ΜL (ref 0.6–4.47)
LYMPHOCYTES NFR BLD AUTO: 18 % (ref 14–44)
MCH RBC QN AUTO: 23.8 PG (ref 26.8–34.3)
MCHC RBC AUTO-ENTMCNC: 31.1 G/DL (ref 31.4–37.4)
MCV RBC AUTO: 76 FL (ref 82–98)
MONOCYTES # BLD AUTO: 0.34 THOUSAND/ΜL (ref 0.17–1.22)
MONOCYTES NFR BLD AUTO: 7 % (ref 4–12)
NEUTROPHILS # BLD AUTO: 3.51 THOUSANDS/ΜL (ref 1.85–7.62)
NEUTS SEG NFR BLD AUTO: 71 % (ref 43–75)
PLATELET # BLD AUTO: 293 THOUSANDS/UL (ref 149–390)
PMV BLD AUTO: 10.3 FL (ref 8.9–12.7)
POTASSIUM SERPL-SCNC: 3.2 MMOL/L (ref 3.5–5.3)
PROT SERPL-MCNC: 7.6 G/DL (ref 6.4–8.2)
RBC # BLD AUTO: 4.5 MILLION/UL (ref 3.81–5.12)
SODIUM SERPL-SCNC: 139 MMOL/L (ref 136–145)
WBC # BLD AUTO: 4.94 THOUSAND/UL (ref 4.31–10.16)

## 2018-01-17 PROCEDURE — 85025 COMPLETE CBC W/AUTO DIFF WBC: CPT

## 2018-01-17 PROCEDURE — 85652 RBC SED RATE AUTOMATED: CPT

## 2018-01-17 PROCEDURE — 36415 COLL VENOUS BLD VENIPUNCTURE: CPT

## 2018-01-17 PROCEDURE — 86140 C-REACTIVE PROTEIN: CPT

## 2018-01-17 PROCEDURE — 82164 ANGIOTENSIN I ENZYME TEST: CPT

## 2018-01-17 PROCEDURE — 86063 ANTISTREPTOLYSIN O SCREEN: CPT

## 2018-01-17 PROCEDURE — 86200 CCP ANTIBODY: CPT

## 2018-01-17 PROCEDURE — 80053 COMPREHEN METABOLIC PANEL: CPT

## 2018-01-18 LAB
ACE SERPL-CCNC: 29 U/L (ref 14–82)
ASO AB TITR SER LA: NORMAL {TITER}

## 2018-01-18 NOTE — MISCELLANEOUS
Provider Comments  Provider Comments:   pt was a no show for appt today      Signatures   Electronically signed by : Jovan Moore, ; Jun 8 2017  9:49AM EST                       (Author)

## 2018-01-19 LAB — CCP IGA+IGG SERPL IA-ACNC: 1 UNITS (ref 0–19)

## 2018-01-22 VITALS
DIASTOLIC BLOOD PRESSURE: 80 MMHG | RESPIRATION RATE: 16 BRPM | HEIGHT: 62 IN | HEART RATE: 82 BPM | TEMPERATURE: 97.7 F | SYSTOLIC BLOOD PRESSURE: 128 MMHG | BODY MASS INDEX: 37.95 KG/M2 | WEIGHT: 206.25 LBS

## 2018-01-22 VITALS
DIASTOLIC BLOOD PRESSURE: 80 MMHG | BODY MASS INDEX: 37.95 KG/M2 | TEMPERATURE: 97.4 F | HEART RATE: 80 BPM | HEIGHT: 62 IN | SYSTOLIC BLOOD PRESSURE: 116 MMHG | RESPIRATION RATE: 16 BRPM | WEIGHT: 206.25 LBS

## 2018-01-22 VITALS — BODY MASS INDEX: 38.37 KG/M2 | WEIGHT: 208.5 LBS | HEIGHT: 62 IN

## 2018-01-24 VITALS
WEIGHT: 206 LBS | SYSTOLIC BLOOD PRESSURE: 122 MMHG | HEART RATE: 80 BPM | RESPIRATION RATE: 16 BRPM | BODY MASS INDEX: 37.91 KG/M2 | TEMPERATURE: 98 F | HEIGHT: 62 IN | DIASTOLIC BLOOD PRESSURE: 80 MMHG

## 2018-02-02 ENCOUNTER — TRANSCRIBE ORDERS (OUTPATIENT)
Dept: LAB | Facility: CLINIC | Age: 53
End: 2018-02-02

## 2018-03-01 DIAGNOSIS — I10 ESSENTIAL HYPERTENSION: Primary | ICD-10-CM

## 2018-03-01 RX ORDER — HYDROCHLOROTHIAZIDE 25 MG/1
TABLET ORAL
Qty: 30 TABLET | Refills: 0 | Status: SHIPPED | OUTPATIENT
Start: 2018-03-01 | End: 2018-04-04 | Stop reason: SDUPTHER

## 2018-03-01 NOTE — TELEPHONE ENCOUNTER
I'll send one more, but she is overdue for an office visit and labs for me  She needs to schedule appt

## 2018-03-03 DIAGNOSIS — I10 ESSENTIAL HYPERTENSION: ICD-10-CM

## 2018-03-04 RX ORDER — HYDROCHLOROTHIAZIDE 25 MG/1
TABLET ORAL
Qty: 90 TABLET | Refills: 0 | OUTPATIENT
Start: 2018-03-04

## 2018-03-04 NOTE — TELEPHONE ENCOUNTER
This is a duplicate request I think, but also she is much overdue for her labs and an appt with me  Needs to schedule

## 2018-04-03 DIAGNOSIS — I10 ESSENTIAL HYPERTENSION: ICD-10-CM

## 2018-04-03 RX ORDER — HYDROCHLOROTHIAZIDE 25 MG/1
TABLET ORAL
Qty: 30 TABLET | Refills: 0 | OUTPATIENT
Start: 2018-04-03

## 2018-04-03 NOTE — TELEPHONE ENCOUNTER
I keep getting refill requests and I know she needs to do labs and reschedule missed appointment  I'm not filling anymore until this is done

## 2018-04-04 DIAGNOSIS — I10 ESSENTIAL HYPERTENSION: ICD-10-CM

## 2018-04-04 RX ORDER — HYDROCHLOROTHIAZIDE 25 MG/1
25 TABLET ORAL DAILY
Qty: 30 TABLET | Refills: 0 | Status: SHIPPED | OUTPATIENT
Start: 2018-04-04 | End: 2018-04-12 | Stop reason: DRUGHIGH

## 2018-04-12 ENCOUNTER — OFFICE VISIT (OUTPATIENT)
Dept: FAMILY MEDICINE CLINIC | Facility: CLINIC | Age: 53
End: 2018-04-12
Payer: COMMERCIAL

## 2018-04-12 VITALS
DIASTOLIC BLOOD PRESSURE: 76 MMHG | WEIGHT: 202 LBS | BODY MASS INDEX: 35.79 KG/M2 | RESPIRATION RATE: 16 BRPM | TEMPERATURE: 95.8 F | HEART RATE: 68 BPM | SYSTOLIC BLOOD PRESSURE: 118 MMHG | HEIGHT: 63 IN

## 2018-04-12 DIAGNOSIS — D50.9 IRON DEFICIENCY ANEMIA, UNSPECIFIED IRON DEFICIENCY ANEMIA TYPE: ICD-10-CM

## 2018-04-12 DIAGNOSIS — E11.9 TYPE 2 DIABETES MELLITUS WITHOUT COMPLICATION, WITHOUT LONG-TERM CURRENT USE OF INSULIN (HCC): Primary | ICD-10-CM

## 2018-04-12 DIAGNOSIS — Z11.59 NEED FOR HEPATITIS C SCREENING TEST: ICD-10-CM

## 2018-04-12 DIAGNOSIS — E87.6 HYPOKALEMIA: ICD-10-CM

## 2018-04-12 DIAGNOSIS — I10 ESSENTIAL HYPERTENSION: ICD-10-CM

## 2018-04-12 DIAGNOSIS — J45.20 MILD INTERMITTENT ASTHMA WITHOUT COMPLICATION: ICD-10-CM

## 2018-04-12 DIAGNOSIS — E55.9 VITAMIN D DEFICIENCY: ICD-10-CM

## 2018-04-12 DIAGNOSIS — K21.9 GERD WITHOUT ESOPHAGITIS: ICD-10-CM

## 2018-04-12 PROBLEM — D86.9 LOFGREN'S SYNDROME: Status: ACTIVE | Noted: 2018-04-12

## 2018-04-12 PROBLEM — L52 ERYTHEMA NODOSUM: Status: ACTIVE | Noted: 2018-04-12

## 2018-04-12 PROBLEM — M25.561 RIGHT KNEE PAIN: Status: ACTIVE | Noted: 2017-11-20

## 2018-04-12 PROBLEM — M79.2 RADICULAR PAIN OF LEFT UPPER EXTREMITY: Status: ACTIVE | Noted: 2017-11-20

## 2018-04-12 PROBLEM — L52 ERYTHEMA NODOSUM: Status: RESOLVED | Noted: 2018-04-12 | Resolved: 2018-04-12

## 2018-04-12 PROCEDURE — 99214 OFFICE O/P EST MOD 30 MIN: CPT | Performed by: FAMILY MEDICINE

## 2018-04-12 RX ORDER — ALBUTEROL SULFATE 90 UG/1
2 AEROSOL, METERED RESPIRATORY (INHALATION) EVERY 6 HOURS PRN
COMMUNITY
Start: 2014-08-07 | End: 2019-04-18 | Stop reason: SDUPTHER

## 2018-04-12 RX ORDER — POTASSIUM CHLORIDE 20 MEQ/1
1 TABLET, EXTENDED RELEASE ORAL DAILY
COMMUNITY
Start: 2017-11-08 | End: 2018-04-12 | Stop reason: ALTCHOICE

## 2018-04-12 NOTE — PROGRESS NOTES
Myrna Erwin 1965 female MRN: [id number]    Family Medicine Follow-up Visit    ASSESSMENT/PLAN  Problem List Items Addressed This Visit        Digestive    GERD without esophagitis     Patient has not had symptoms despite not having med for a few days  Will continue to hold pantoprazole unless symptoms recur  Endocrine    Type 2 diabetes mellitus ([facility] [address] ) - Primary     Unknown status, needs to update labs  Continue current metformin 500 mg daily for now  Relevant Medications    metFORMIN (GLUCOPHAGE) 500 mg tablet    Other Relevant Orders    HEMOGLOBIN A1C W/ EAG ESTIMATION    Comprehensive metabolic panel    Microalbumin / creatinine urine ratio       Respiratory    Mild intermittent asthma without complication     Will await next notes from [facility] regarding sarcoidosis  Cont RC prn and monitor for symptoms  Consider repeating spirometry  Relevant Medications    albuterol (VENTOLIN HFA) 90 mcg/act inhaler       Cardiovascular and Mediastinum    Hypertension     Will d/c HCTZ (and potassium) as BP looks great without lisinopril  Will do home BPs every day and reviewed goal <140/<90  If consistently higher will restart lisinopril 10 mg daily  Relevant Orders    Comprehensive metabolic panel       Other    Hypokalemia     Unknown current status  Since stopping HCTZ will stop K+ supplement and check metabolic panel after a week  Relevant Orders    Comprehensive metabolic panel    Iron deficiency anemia     Unknown current status  Update CBC  Relevant Orders    CBC and differential    Vitamin D deficiency     Unknown current status, update lab  Relevant Orders    Vitamin D 25 hydroxy      Other Visit Diagnoses     Need for hepatitis C screening test        Relevant Orders    Hepatitis C antibody          Will be seeing [facility] next week to follow up on new diagnosis of sarcoidosis with subsequent erythema nodosum        Will f/u pending new lab results, and routine f/u in 6 months  No future appointments  SUBJECTIVE  CC: Follow-up    HPI:  Esther Lewis is a 47 y o  female who presents for follow up of chronic issues:  DM2 - taking metformin  No home glucose monitoring  Overall feeling better now so is more physically active  HTN - inconsistently taking lisinopril  Is taking HCTZ and potassium supplement  Thinks her BP is better now (monitored at home daily) that she is feeling better after being treated for ankle pain, which turned out to be EN associated with new diagnosis of sarcoidosis in lungs  GERD - pharmacy has mentioned insurance doesn't want to cover pantoprazole  Hasn't had med in acouple of days and no symptoms  Asthma - as above, has now been told she has sarcoidosis with LAD in chest   Rarely using RC  H/O iron deficiency anemia and Vit D def - hasn't done labs yet  Review of Systems   Constitutional: Negative for appetite change, chills, fatigue, fever and unexpected weight change  HENT: Negative for hearing loss  Eyes: Negative for visual disturbance  Respiratory: Negative for cough and shortness of breath  Cardiovascular: Negative for chest pain, palpitations and leg swelling  Gastrointestinal: Negative for abdominal pain, blood in stool, constipation, diarrhea, nausea and vomiting  Genitourinary: Negative for dysuria  Musculoskeletal: Negative for arthralgias and myalgias  Neurological: Negative for dizziness, weakness and headaches  Hematological: Does not bruise/bleed easily  Psychiatric/Behavioral: Negative for dysphoric mood and sleep disturbance  The patient is not nervous/anxious        Historical Information   The patient history was reviewed as follows:    Past Medical History:   Diagnosis Date    Asthmatic bronchitis with exacerbation     Last Assessed: 8/7/2014    GERD (gastroesophageal reflux disease)     Hyperlipidemia     Hypertension     Impaired fasting glucose Last Assessed: 1/15/2015     Past Surgical History:   Procedure Laterality Date    HAND SURGERY      OTHER SURGICAL HISTORY      circloge    THYROID SURGERY      THYROIDECTOMY, PARTIAL      TUBAL LIGATION      TUMOR REMOVAL      right wrist    UTERINE FIBROID EMBOLIZATION      Last Assessed: 7/25/2014     Family History   Problem Relation Age of Onset    Coronary artery disease Mother     Diabetes Father     Stroke Father      Ischemic    Diabetes Brother       Social History   History   Alcohol Use No     History   Drug Use No     History   Smoking Status    Never Smoker   Smokeless Tobacco    Never Used       Medications:     Current Outpatient Prescriptions:     albuterol (VENTOLIN HFA) 90 mcg/act inhaler, Inhale 2 puffs every 6 (six) hours as needed, Disp: , Rfl:     atorvastatin (LIPITOR) 40 mg tablet, Take 40 mg by mouth daily  , Disp: , Rfl:     metFORMIN (GLUCOPHAGE) 500 mg tablet, Take 1 tablet by mouth, Disp: , Rfl:   Allergies   Allergen Reactions    Levaquin [Levofloxacin In D5w]        OBJECTIVE    Vitals:   Vitals:    04/12/18 0952   BP: 118/76   Pulse: 68   Resp: 16   Temp: (!) 95 8 °F (35 4 °C)   Weight: 91 6 kg (202 lb)   Height: 5' 2 8" (1 595 m)     Physical Exam   Constitutional: She is oriented to person, place, and time  She appears well-developed and well-nourished  No distress  HENT:   Head: Normocephalic and atraumatic  Mouth/Throat: Oropharynx is clear and moist    Eyes: Conjunctivae and EOM are normal  Pupils are equal, round, and reactive to light  No scleral icterus  Neck: Neck supple  Cardiovascular: Normal rate, regular rhythm, normal heart sounds and intact distal pulses  Pulmonary/Chest: Effort normal and breath sounds normal    Abdominal: Soft  Bowel sounds are normal  She exhibits no distension  There is no tenderness  Neurological: She is alert and oriented to person, place, and time  No cranial nerve deficit  Skin: No pallor     Psychiatric: She has a normal mood and affect   Her behavior is normal  Thought content normal

## 2018-04-12 NOTE — ASSESSMENT & PLAN NOTE
Patient has not had symptoms despite not having med for a few days  Will continue to hold pantoprazole unless symptoms recur

## 2018-04-12 NOTE — ASSESSMENT & PLAN NOTE
Will d/c HCTZ (and potassium) as BP looks great without lisinopril  Will do home BPs every day and reviewed goal <140/<90  If consistently higher will restart lisinopril 10 mg daily

## 2018-04-12 NOTE — ASSESSMENT & PLAN NOTE
Will await next notes from [facility] regarding sarcoidosis  Cont RC prn and monitor for symptoms  Consider repeating spirometry

## 2018-05-17 ENCOUNTER — TELEPHONE (OUTPATIENT)
Dept: FAMILY MEDICINE CLINIC | Facility: CLINIC | Age: 53
End: 2018-05-17

## 2018-05-17 DIAGNOSIS — I10 ESSENTIAL HYPERTENSION: Primary | ICD-10-CM

## 2018-05-17 PROCEDURE — 4010F ACE/ARB THERAPY RXD/TAKEN: CPT | Performed by: FAMILY MEDICINE

## 2018-05-17 RX ORDER — LISINOPRIL 10 MG/1
10 TABLET ORAL DAILY
Qty: 90 TABLET | Refills: 1 | Status: SHIPPED | OUTPATIENT
Start: 2018-05-17 | End: 2018-11-08 | Stop reason: SDUPTHER

## 2018-05-17 NOTE — TELEPHONE ENCOUNTER
Spoke with patient, states that over the last week her blood pressure has been increasing  She has not checked daily but she has checked multiple times in the past week, ranging from 144//90, she did not have a diary  She feels she needs to restart meds, your last note mentioned lisinopril 10mg  Please review

## 2018-06-06 ENCOUNTER — APPOINTMENT (OUTPATIENT)
Dept: LAB | Facility: CLINIC | Age: 53
End: 2018-06-06
Payer: COMMERCIAL

## 2018-06-06 ENCOUNTER — TRANSCRIBE ORDERS (OUTPATIENT)
Dept: LAB | Facility: CLINIC | Age: 53
End: 2018-06-06

## 2018-06-06 DIAGNOSIS — E11.9 TYPE 2 DIABETES MELLITUS WITHOUT COMPLICATION, WITHOUT LONG-TERM CURRENT USE OF INSULIN (HCC): ICD-10-CM

## 2018-06-06 DIAGNOSIS — D50.9 IRON DEFICIENCY ANEMIA, UNSPECIFIED IRON DEFICIENCY ANEMIA TYPE: ICD-10-CM

## 2018-06-06 DIAGNOSIS — E87.6 HYPOKALEMIA: ICD-10-CM

## 2018-06-06 DIAGNOSIS — Z11.59 NEED FOR HEPATITIS C SCREENING TEST: ICD-10-CM

## 2018-06-06 DIAGNOSIS — I10 ESSENTIAL HYPERTENSION: ICD-10-CM

## 2018-06-06 DIAGNOSIS — E55.9 VITAMIN D DEFICIENCY: ICD-10-CM

## 2018-06-06 LAB
25(OH)D3 SERPL-MCNC: 23 NG/ML (ref 30–100)
ALBUMIN SERPL BCP-MCNC: 3.6 G/DL (ref 3.5–5)
ALP SERPL-CCNC: 84 U/L (ref 46–116)
ALT SERPL W P-5'-P-CCNC: 31 U/L (ref 12–78)
ANION GAP SERPL CALCULATED.3IONS-SCNC: 12 MMOL/L (ref 4–13)
AST SERPL W P-5'-P-CCNC: 19 U/L (ref 5–45)
BASOPHILS # BLD AUTO: 0.02 THOUSANDS/ΜL (ref 0–0.1)
BASOPHILS NFR BLD AUTO: 0 % (ref 0–1)
BILIRUB SERPL-MCNC: 0.3 MG/DL (ref 0.2–1)
BUN SERPL-MCNC: 10 MG/DL (ref 5–25)
CALCIUM SERPL-MCNC: 8.8 MG/DL (ref 8.3–10.1)
CHLORIDE SERPL-SCNC: 104 MMOL/L (ref 100–108)
CO2 SERPL-SCNC: 24 MMOL/L (ref 21–32)
CREAT SERPL-MCNC: 0.81 MG/DL (ref 0.6–1.3)
CREAT UR-MCNC: 194 MG/DL
EOSINOPHIL # BLD AUTO: 0.14 THOUSAND/ΜL (ref 0–0.61)
EOSINOPHIL NFR BLD AUTO: 3 % (ref 0–6)
ERYTHROCYTE [DISTWIDTH] IN BLOOD BY AUTOMATED COUNT: 18 % (ref 11.6–15.1)
EST. AVERAGE GLUCOSE BLD GHB EST-MCNC: 166 MG/DL
GFR SERPL CREATININE-BSD FRML MDRD: 83 ML/MIN/1.73SQ M
GLUCOSE P FAST SERPL-MCNC: 142 MG/DL (ref 65–99)
HBA1C MFR BLD: 7.4 % (ref 4.2–6.3)
HCT VFR BLD AUTO: 37 % (ref 34.8–46.1)
HCV AB SER QL: NORMAL
HGB BLD-MCNC: 11.5 G/DL (ref 11.5–15.4)
LYMPHOCYTES # BLD AUTO: 0.86 THOUSANDS/ΜL (ref 0.6–4.47)
LYMPHOCYTES NFR BLD AUTO: 19 % (ref 14–44)
MCH RBC QN AUTO: 23.2 PG (ref 26.8–34.3)
MCHC RBC AUTO-ENTMCNC: 31.1 G/DL (ref 31.4–37.4)
MCV RBC AUTO: 75 FL (ref 82–98)
MICROALBUMIN UR-MCNC: 14.5 MG/L (ref 0–20)
MICROALBUMIN/CREAT 24H UR: 7 MG/G CREATININE (ref 0–30)
MONOCYTES # BLD AUTO: 0.34 THOUSAND/ΜL (ref 0.17–1.22)
MONOCYTES NFR BLD AUTO: 7 % (ref 4–12)
NEUTROPHILS # BLD AUTO: 3.27 THOUSANDS/ΜL (ref 1.85–7.62)
NEUTS SEG NFR BLD AUTO: 71 % (ref 43–75)
PLATELET # BLD AUTO: 256 THOUSANDS/UL (ref 149–390)
PMV BLD AUTO: 10 FL (ref 8.9–12.7)
POTASSIUM SERPL-SCNC: 3.9 MMOL/L (ref 3.5–5.3)
PROT SERPL-MCNC: 7 G/DL (ref 6.4–8.2)
RBC # BLD AUTO: 4.95 MILLION/UL (ref 3.81–5.12)
SODIUM SERPL-SCNC: 140 MMOL/L (ref 136–145)
WBC # BLD AUTO: 4.63 THOUSAND/UL (ref 4.31–10.16)

## 2018-06-06 PROCEDURE — 83036 HEMOGLOBIN GLYCOSYLATED A1C: CPT

## 2018-06-06 PROCEDURE — 82043 UR ALBUMIN QUANTITATIVE: CPT

## 2018-06-06 PROCEDURE — 82306 VITAMIN D 25 HYDROXY: CPT

## 2018-06-06 PROCEDURE — 80053 COMPREHEN METABOLIC PANEL: CPT

## 2018-06-06 PROCEDURE — 3061F NEG MICROALBUMINURIA REV: CPT | Performed by: FAMILY MEDICINE

## 2018-06-06 PROCEDURE — 86803 HEPATITIS C AB TEST: CPT

## 2018-06-06 PROCEDURE — 85025 COMPLETE CBC W/AUTO DIFF WBC: CPT

## 2018-06-06 PROCEDURE — 36415 COLL VENOUS BLD VENIPUNCTURE: CPT

## 2018-06-06 PROCEDURE — 82570 ASSAY OF URINE CREATININE: CPT

## 2018-06-06 NOTE — PROGRESS NOTES
Lewis Urias 1965 female MRN: [id number]    Family Medicine Follow-up Visit    ASSESSMENT/PLAN  Problem List Items Addressed This Visit        Endocrine    Type 2 diabetes mellitus ([address] )     Not at goal below 7, but has not been taking metformin consistently  Will try using the extended release metformin for better GI tolerance  If she is able to continue will repeat A1c in 3 months  If not will try an alternate oral agent, asked her to call if she needs to switch rather than wait until regular f/u  Advised to schedule her eye exam   Foot exam performed today  Urine microalbumin screen just completed and normal            Relevant Medications    metFORMIN (GLUCOPHAGE-XR) 500 mg 24 hr tablet    Other Relevant Orders    Ambulatory referral to Ophthalmology    Hemoglobin A1C       Cardiovascular and Mediastinum    Hypertension - Primary     At goal <140/<90 on lisinopril 10 mg daily  Will continue to monitor BP and renal function  Other    Iron deficiency anemia     Not currently anemic and not consistently taking iron supplement  Advised to continue taking iron supplement if she isn't getting high iron foods in her diet consistently as she still has microcytosis  Will continue to monitor CBC  Relevant Orders    CBC and differential    Vitamin D deficiency     Will try taking 2000 IU Vit D OTC daily first and repeat level in 3 months  If unable to get adequate level will resume Rx dose  Relevant Orders    Vitamin D 25 hydroxy      Other Visit Diagnoses     Screening for breast cancer        Relevant Orders    Mammo screening bilateral w cad          Will see her back in 3 months after f/u labs      Future Appointments  Date Time Provider Delon Rosebnerg   9/6/2018 9:30 AM Christine Lara MD [facility]        SUBJECTIVE  CC: Follow-up (3 month )    HPI:  Lewis Urias is a 47 y o  female who presents for follow up on lab results to follow chronic conditions:  HTN - had held some medications for lower BP and persistent hypokalemia on HCTZ  Home BP's were consistently increasing so resumed lisinopril  DM2 - hasn't been taking metformin every day  Home glucose never above 200  Vit D def - hasn't been taking Vit D, completed Rx  Iron deficiency anemia - not taking any vitamins  Review of Systems   Constitutional: Negative for fatigue, fever and unexpected weight change  Eyes: Positive for visual disturbance (needing reading glasses more, last eye exam 2 years ago)  Respiratory: Negative for shortness of breath  Cardiovascular: Negative for chest pain and leg swelling  Neurological: Negative for dizziness and numbness  Historical Information   The patient history was reviewed as follows:    Past Medical History:   Diagnosis Date    Asthmatic bronchitis with exacerbation     Last Assessed: 8/7/2014    GERD (gastroesophageal reflux disease)     Hyperlipidemia     Hypertension     Impaired fasting glucose     Last Assessed: 1/15/2015     Past Surgical History:   Procedure Laterality Date    HAND SURGERY      OTHER SURGICAL HISTORY      circloge    THYROID SURGERY      THYROIDECTOMY, PARTIAL      TUBAL LIGATION      TUMOR REMOVAL      right wrist    UTERINE FIBROID EMBOLIZATION      Last Assessed: 7/25/2014     Family History   Problem Relation Age of Onset    Coronary artery disease Mother     Diabetes Father     Stroke Father      Ischemic    Diabetes Brother       Social History   History   Alcohol Use No     History   Drug Use No     History   Smoking Status    Never Smoker   Smokeless Tobacco    Never Used       Medications:     Current Outpatient Prescriptions:     albuterol (VENTOLIN HFA) 90 mcg/act inhaler, Inhale 2 puffs every 6 (six) hours as needed, Disp: , Rfl:     atorvastatin (LIPITOR) 40 mg tablet, Take 40 mg by mouth daily  , Disp: , Rfl:     lisinopril (ZESTRIL) 10 mg tablet, Take 1 tablet (10 mg total) by mouth daily, Disp: 90 tablet, Rfl: 1    metFORMIN (GLUCOPHAGE-XR) 500 mg 24 hr tablet, Take 1 tablet (500 mg total) by mouth 2 (two) times a day with meals, Disp: 60 tablet, Rfl: 3  Allergies   Allergen Reactions    Levaquin [Levofloxacin In D5w]        OBJECTIVE    Vitals:   Vitals:    06/07/18 0857   BP: 130/86   Pulse: 80   Resp: 16   Temp: (!) 96 5 °F (35 8 °C)   Weight: 89 4 kg (197 lb)   Height: 5' 3 3" (1 608 m)       Physical Exam   Constitutional: She is oriented to person, place, and time  She appears well-developed and well-nourished  HENT:   Mouth/Throat: Oropharynx is clear and moist    Eyes: Conjunctivae are normal  No scleral icterus  Cardiovascular: Pulses are no weak pulses  Pulses:       Dorsalis pedis pulses are 2+ on the right side, and 2+ on the left side  Posterior tibial pulses are 2+ on the right side, and 2+ on the left side  Pulmonary/Chest: Effort normal    Musculoskeletal: She exhibits no edema  Feet:   Right Foot:   Skin Integrity: Negative for ulcer, skin breakdown, erythema, warmth, callus or dry skin  Left Foot:   Skin Integrity: Negative for ulcer, skin breakdown, erythema, warmth, callus or dry skin  Neurological: She is alert and oriented to person, place, and time  Skin: No pallor  Patient's shoes and socks removed  Right Foot/Ankle   Right Foot Inspection  Skin Exam: skin normal and skin intact no dry skin, no warmth, no callus, no erythema, no maceration, no abnormal color, no pre-ulcer, no ulcer and no callus                            Sensory       Monofilament testing: intact  Vascular    The right DP pulse is 2+  The right PT pulse is 2+  Left Foot/Ankle  Left Foot Inspection  Skin Exam: skin normal and skin intactno dry skin, no warmth, no erythema, no maceration, normal color, no pre-ulcer, no ulcer and no callus                                         Sensory       Monofilament: intact  Vascular    The left DP pulse is 2+   The left PT pulse is 2+    Assign Risk Category:  No deformity present; No loss of protective sensation; No weak pulses       Risk: 0      Appointment on 06/06/2018   Component Date Value Ref Range Status    Hemoglobin A1C 06/06/2018 7 4* 4 2 - 6 3 % Final    EAG 06/06/2018 166  mg/dl Final    Sodium 06/06/2018 140  136 - 145 mmol/L Final    Potassium 06/06/2018 3 9  3 5 - 5 3 mmol/L Final    Chloride 06/06/2018 104  100 - 108 mmol/L Final    CO2 06/06/2018 24  21 - 32 mmol/L Final    Anion Gap 06/06/2018 12  4 - 13 mmol/L Final    BUN 06/06/2018 10  5 - 25 mg/dL Final    Creatinine 06/06/2018 0 81  0 60 - 1 30 mg/dL Final    Standardized to IDMS reference method    Glucose, Fasting 06/06/2018 142* 65 - 99 mg/dL Final      Specimen collection should occur prior to Sulfasalazine administration due to the potential for falsely depressed results  Specimen collection should occur prior to Sulfapyridine administration due to the potential for falsely elevated results   Calcium 06/06/2018 8 8  8 3 - 10 1 mg/dL Final    AST 06/06/2018 19  5 - 45 U/L Final      Specimen collection should occur prior to Sulfasalazine administration due to the potential for falsely depressed results   ALT 06/06/2018 31  12 - 78 U/L Final      Specimen collection should occur prior to Sulfasalazine administration due to the potential for falsely depressed results   Alkaline Phosphatase 06/06/2018 84  46 - 116 U/L Final    Total Protein 06/06/2018 7 0  6 4 - 8 2 g/dL Final    Albumin 06/06/2018 3 6  3 5 - 5 0 g/dL Final    Total Bilirubin 06/06/2018 0 30  0 20 - 1 00 mg/dL Final    eGFR 06/06/2018 83  ml/min/1 73sq m Final    Creatinine, Ur 06/06/2018 194 0  mg/dL Final    Microalbum  ,U,Random 06/06/2018 14 5  0 0 - 20 0 mg/L Final    Microalb Creat Ratio 06/06/2018 7  0 - 30 mg/g creatinine Final    Vit D, 25-Hydroxy 06/06/2018 23 0* 30 0 - 100 0 ng/mL Final    Hepatitis C Ab 06/06/2018 Non-reactive  Non-reactive Final    WBC 06/06/2018 4 63  4 31 - 10 16 Thousand/uL Final    RBC 06/06/2018 4 95  3 81 - 5 12 Million/uL Final    Hemoglobin 06/06/2018 11 5  11 5 - 15 4 g/dL Final    Hematocrit 06/06/2018 37 0  34 8 - 46 1 % Final    MCV 06/06/2018 75* 82 - 98 fL Final    MCH 06/06/2018 23 2* 26 8 - 34 3 pg Final    MCHC 06/06/2018 31 1* 31 4 - 37 4 g/dL Final    RDW 06/06/2018 18 0* 11 6 - 15 1 % Final    MPV 06/06/2018 10 0  8 9 - 12 7 fL Final    Platelets 49/98/7543 256  149 - 390 Thousands/uL Final    Neutrophils Relative 06/06/2018 71  43 - 75 % Final    Lymphocytes Relative 06/06/2018 19  14 - 44 % Final    Monocytes Relative 06/06/2018 7  4 - 12 % Final    Eosinophils Relative 06/06/2018 3  0 - 6 % Final    Basophils Relative 06/06/2018 0  0 - 1 % Final    Neutrophils Absolute 06/06/2018 3 27  1 85 - 7 62 Thousands/µL Final    Lymphocytes Absolute 06/06/2018 0 86  0 60 - 4 47 Thousands/µL Final    Monocytes Absolute 06/06/2018 0 34  0 17 - 1 22 Thousand/µL Final    Eosinophils Absolute 06/06/2018 0 14  0 00 - 0 61 Thousand/µL Final    Basophils Absolute 06/06/2018 0 02  0 00 - 0 10 Thousands/µL Final       Wander Alvares MD  [facility]  6/7/2018

## 2018-06-07 ENCOUNTER — OFFICE VISIT (OUTPATIENT)
Dept: FAMILY MEDICINE CLINIC | Facility: CLINIC | Age: 53
End: 2018-06-07
Payer: COMMERCIAL

## 2018-06-07 VITALS
RESPIRATION RATE: 16 BRPM | HEIGHT: 63 IN | WEIGHT: 197 LBS | TEMPERATURE: 96.5 F | DIASTOLIC BLOOD PRESSURE: 86 MMHG | SYSTOLIC BLOOD PRESSURE: 130 MMHG | HEART RATE: 80 BPM | BODY MASS INDEX: 34.91 KG/M2

## 2018-06-07 DIAGNOSIS — E55.9 VITAMIN D DEFICIENCY: ICD-10-CM

## 2018-06-07 DIAGNOSIS — Z12.39 SCREENING FOR BREAST CANCER: ICD-10-CM

## 2018-06-07 DIAGNOSIS — I10 ESSENTIAL HYPERTENSION: Primary | ICD-10-CM

## 2018-06-07 DIAGNOSIS — E11.9 TYPE 2 DIABETES MELLITUS WITHOUT COMPLICATION, WITHOUT LONG-TERM CURRENT USE OF INSULIN (HCC): ICD-10-CM

## 2018-06-07 DIAGNOSIS — D50.9 IRON DEFICIENCY ANEMIA, UNSPECIFIED IRON DEFICIENCY ANEMIA TYPE: ICD-10-CM

## 2018-06-07 PROCEDURE — 3075F SYST BP GE 130 - 139MM HG: CPT | Performed by: FAMILY MEDICINE

## 2018-06-07 PROCEDURE — 3079F DIAST BP 80-89 MM HG: CPT | Performed by: FAMILY MEDICINE

## 2018-06-07 PROCEDURE — 99214 OFFICE O/P EST MOD 30 MIN: CPT | Performed by: FAMILY MEDICINE

## 2018-06-07 RX ORDER — METFORMIN HYDROCHLORIDE 500 MG/1
500 TABLET, EXTENDED RELEASE ORAL 2 TIMES DAILY WITH MEALS
Qty: 60 TABLET | Refills: 3 | Status: SHIPPED | OUTPATIENT
Start: 2018-06-07 | End: 2018-11-08 | Stop reason: SDUPTHER

## 2018-06-07 NOTE — ASSESSMENT & PLAN NOTE
Will try taking 2000 IU Vit D OTC daily first and repeat level in 3 months  If unable to get adequate level will resume Rx dose

## 2018-06-07 NOTE — ASSESSMENT & PLAN NOTE
Not currently anemic and not consistently taking iron supplement  Advised to continue taking iron supplement if she isn't getting high iron foods in her diet consistently as she still has microcytosis  Will continue to monitor CBC

## 2018-06-07 NOTE — ASSESSMENT & PLAN NOTE
Not at goal below 7, but has not been taking metformin consistently  Will try using the extended release metformin for better GI tolerance  If she is able to continue will repeat A1c in 3 months  If not will try an alternate oral agent, asked her to call if she needs to switch rather than wait until regular f/u  Advised to schedule her eye exam   Foot exam performed today    Urine microalbumin screen just completed and normal

## 2018-07-13 ENCOUNTER — OFFICE VISIT (OUTPATIENT)
Dept: FAMILY MEDICINE CLINIC | Facility: CLINIC | Age: 53
End: 2018-07-13
Payer: COMMERCIAL

## 2018-07-13 VITALS
WEIGHT: 196.8 LBS | HEART RATE: 82 BPM | TEMPERATURE: 98.3 F | OXYGEN SATURATION: 97 % | SYSTOLIC BLOOD PRESSURE: 150 MMHG | BODY MASS INDEX: 34.87 KG/M2 | RESPIRATION RATE: 26 BRPM | DIASTOLIC BLOOD PRESSURE: 90 MMHG | HEIGHT: 63 IN

## 2018-07-13 DIAGNOSIS — J06.9 VIRAL UPPER RESPIRATORY TRACT INFECTION: Primary | ICD-10-CM

## 2018-07-13 PROCEDURE — 3008F BODY MASS INDEX DOCD: CPT | Performed by: FAMILY MEDICINE

## 2018-07-13 PROCEDURE — 99213 OFFICE O/P EST LOW 20 MIN: CPT | Performed by: FAMILY MEDICINE

## 2018-07-13 RX ORDER — BENZONATATE 100 MG/1
100 CAPSULE ORAL 3 TIMES DAILY PRN
Qty: 20 CAPSULE | Refills: 0 | Status: SHIPPED | OUTPATIENT
Start: 2018-07-13 | End: 2019-05-03 | Stop reason: ALTCHOICE

## 2018-07-13 NOTE — PROGRESS NOTES
Assessment/Plan     Viral upper respiratory tract infection  -cough , nasal congestion , running nose for 3 days   - likely viral   -will give tessalon perles 100mg tid prn  -continue albuterol inhaler as needed q6 prn  -return to care parameters explained    Diagnoses and all orders for this visit:    Viral upper respiratory tract infection  -     benzonatate (TESSALON PERLES) 100 mg capsule; Take 1 capsule (100 mg total) by mouth 3 (three) times a day as needed for cough       Subjective     Chief Complaint   Patient presents with    Cough     X 3 days     Nasal Congestion     X3 days        48 yo F with past medical history significant for diabetes mellitus, hypertension presents to office for an acute visit with complaints of cough, nasal congestion, running nose for 3 days  Patient denies any fever, sick contacts, difficulty in breathing, sore throat, pain abdomen, nausea vomiting diarrhea  Cough is productive with yellowish sputum  Patient is also known case of mild intermittent asthma and so has been using her albuterol inhaler but is unsure if it is helping her  Last time she had inhaler was in the morning  Cough   This is a new problem  Episode onset: 3 daysago  The cough is productive of sputum  Associated symptoms include ear pain and rhinorrhea  Pertinent negatives include no chest pain, fever, headaches, shortness of breath or wheezing  She has tried OTC cough suppressant for the symptoms  The treatment provided no relief  Her past medical history is significant for asthma  The following portions of the patient's history were reviewed and updated as appropriate: allergies, current medications, past family history, past medical history, past social history, past surgical history and problem list     Review of Systems   Constitutional: Negative for activity change, appetite change, diaphoresis, fatigue and fever  HENT: Positive for congestion, ear pain, rhinorrhea and sneezing      Eyes: Negative for discharge and itching  Respiratory: Positive for cough  Negative for chest tightness, shortness of breath and wheezing  Cardiovascular: Negative for chest pain and palpitations  Gastrointestinal: Negative for abdominal distention  Genitourinary: Negative for difficulty urinating  Neurological: Negative for dizziness and headaches  Objective     Vitals:Blood pressure 150/90, pulse 82, temperature 98 3 °F (36 8 °C), resp  rate (!) 26, height 5' 2 9" (1 598 m), weight 89 3 kg (196 lb 12 8 oz), last menstrual period 11/27/2017, SpO2 97 %  Physical Exam:  Physical Exam   Constitutional: She is oriented to person, place, and time  She appears well-developed and well-nourished  No distress  HENT:   Head: Normocephalic and atraumatic  Left Ear: External ear normal    Mouth/Throat: Oropharynx is clear and moist    Rt tympanic membrane slightly congested   Eyes: Conjunctivae and EOM are normal    Neck: Normal range of motion  Neck supple  Cardiovascular: Normal rate, regular rhythm and normal heart sounds  Exam reveals no friction rub  No murmur heard  Pulmonary/Chest: Effort normal and breath sounds normal  No respiratory distress  She has no wheezes  She has no rales  Pulse ox 97%  RR 16   Abdominal: Soft  Bowel sounds are normal  She exhibits no distension  There is no tenderness  Musculoskeletal: Normal range of motion  Neurological: She is alert and oriented to person, place, and time  Skin: Skin is warm and dry  She is not diaphoretic  Vitals reviewed        Lab Results:

## 2018-07-14 PROBLEM — J06.9 VIRAL UPPER RESPIRATORY TRACT INFECTION: Status: ACTIVE | Noted: 2018-07-14

## 2018-07-14 NOTE — ASSESSMENT & PLAN NOTE
-cough , nasal congestion , running nose for 3 days   - likely viral   -will give tessalon perles 100mg tid prn  -continue albuterol inhaler as needed q6 prn  -return to care parameters explained

## 2018-08-03 DIAGNOSIS — E78.5 HYPERLIPIDEMIA, UNSPECIFIED HYPERLIPIDEMIA TYPE: Primary | ICD-10-CM

## 2018-08-03 RX ORDER — ATORVASTATIN CALCIUM 40 MG/1
TABLET, FILM COATED ORAL
Qty: 90 TABLET | Refills: 0 | Status: SHIPPED | OUTPATIENT
Start: 2018-08-03 | End: 2018-11-08 | Stop reason: SDUPTHER

## 2018-10-16 ENCOUNTER — APPOINTMENT (OUTPATIENT)
Dept: LAB | Facility: CLINIC | Age: 53
End: 2018-10-16
Payer: COMMERCIAL

## 2018-10-16 ENCOUNTER — TRANSCRIBE ORDERS (OUTPATIENT)
Dept: LAB | Facility: CLINIC | Age: 53
End: 2018-10-16

## 2018-10-16 DIAGNOSIS — D50.9 IRON DEFICIENCY ANEMIA, UNSPECIFIED IRON DEFICIENCY ANEMIA TYPE: ICD-10-CM

## 2018-10-16 DIAGNOSIS — E55.9 VITAMIN D DEFICIENCY: ICD-10-CM

## 2018-10-16 DIAGNOSIS — E11.9 TYPE 2 DIABETES MELLITUS WITHOUT COMPLICATION, WITHOUT LONG-TERM CURRENT USE OF INSULIN (HCC): ICD-10-CM

## 2018-10-16 LAB
25(OH)D3 SERPL-MCNC: 24.3 NG/ML (ref 30–100)
BASOPHILS # BLD AUTO: 0.01 THOUSANDS/ΜL (ref 0–0.1)
BASOPHILS NFR BLD AUTO: 0 % (ref 0–1)
EOSINOPHIL # BLD AUTO: 0.11 THOUSAND/ΜL (ref 0–0.61)
EOSINOPHIL NFR BLD AUTO: 2 % (ref 0–6)
ERYTHROCYTE [DISTWIDTH] IN BLOOD BY AUTOMATED COUNT: 18.1 % (ref 11.6–15.1)
EST. AVERAGE GLUCOSE BLD GHB EST-MCNC: 137 MG/DL
HBA1C MFR BLD: 6.4 % (ref 4.2–6.3)
HCT VFR BLD AUTO: 41.7 % (ref 34.8–46.1)
HGB BLD-MCNC: 12.5 G/DL (ref 11.5–15.4)
IMM GRANULOCYTES # BLD AUTO: 0.01 THOUSAND/UL (ref 0–0.2)
IMM GRANULOCYTES NFR BLD AUTO: 0 % (ref 0–2)
LYMPHOCYTES # BLD AUTO: 0.93 THOUSANDS/ΜL (ref 0.6–4.47)
LYMPHOCYTES NFR BLD AUTO: 19 % (ref 14–44)
MCH RBC QN AUTO: 24 PG (ref 26.8–34.3)
MCHC RBC AUTO-ENTMCNC: 30 G/DL (ref 31.4–37.4)
MCV RBC AUTO: 80 FL (ref 82–98)
MONOCYTES # BLD AUTO: 0.37 THOUSAND/ΜL (ref 0.17–1.22)
MONOCYTES NFR BLD AUTO: 7 % (ref 4–12)
NEUTROPHILS # BLD AUTO: 3.59 THOUSANDS/ΜL (ref 1.85–7.62)
NEUTS SEG NFR BLD AUTO: 72 % (ref 43–75)
NRBC BLD AUTO-RTO: 0 /100 WBCS
PLATELET # BLD AUTO: 212 THOUSANDS/UL (ref 149–390)
PMV BLD AUTO: 10.4 FL (ref 8.9–12.7)
RBC # BLD AUTO: 5.21 MILLION/UL (ref 3.81–5.12)
WBC # BLD AUTO: 5.02 THOUSAND/UL (ref 4.31–10.16)

## 2018-10-16 PROCEDURE — 82306 VITAMIN D 25 HYDROXY: CPT

## 2018-10-16 PROCEDURE — 36415 COLL VENOUS BLD VENIPUNCTURE: CPT

## 2018-10-16 PROCEDURE — 85025 COMPLETE CBC W/AUTO DIFF WBC: CPT

## 2018-10-16 PROCEDURE — 83036 HEMOGLOBIN GLYCOSYLATED A1C: CPT

## 2018-10-20 ENCOUNTER — HOSPITAL ENCOUNTER (EMERGENCY)
Facility: HOSPITAL | Age: 53
Discharge: HOME | End: 2018-10-20
Attending: EMERGENCY MEDICINE | Admitting: EMERGENCY MEDICINE
Payer: COMMERCIAL

## 2018-10-20 VITALS
BODY MASS INDEX: 36.23 KG/M2 | SYSTOLIC BLOOD PRESSURE: 200 MMHG | HEART RATE: 66 BPM | RESPIRATION RATE: 16 BRPM | HEIGHT: 62 IN | WEIGHT: 196.87 LBS | TEMPERATURE: 97.5 F | DIASTOLIC BLOOD PRESSURE: 99 MMHG | OXYGEN SATURATION: 98 %

## 2018-10-20 DIAGNOSIS — I10 HYPERTENSION: Primary | ICD-10-CM

## 2018-10-20 PROCEDURE — 93005 ELECTROCARDIOGRAM TRACING: CPT

## 2018-10-20 PROCEDURE — 99283 EMERGENCY DEPT VISIT LOW MDM: CPT

## 2018-10-20 RX ORDER — IBUPROFEN 600 MG/1
600 TABLET ORAL ONCE
Status: COMPLETED | OUTPATIENT
Start: 2018-10-20 | End: 2018-10-20

## 2018-10-20 RX ORDER — ACETAMINOPHEN 325 MG/1
975 TABLET ORAL ONCE
Status: COMPLETED | OUTPATIENT
Start: 2018-10-20 | End: 2018-10-20

## 2018-10-20 RX ADMIN — ACETAMINOPHEN 975 MG: 325 TABLET, FILM COATED ORAL at 23:33

## 2018-10-20 RX ADMIN — IBUPROFEN 600 MG: 600 TABLET ORAL at 23:33

## 2018-10-21 NOTE — ED PROVIDER NOTES
History  Chief Complaint   Patient presents with    Hypertension     Slight headache, hypertension  Has been high all day, took an extra lisinopril  HPI     Patient is a frankie 74-year-old female that reports to the emergency department with mild headache and then high blood pressure when she took it today  No fevers, chills, sweats, focal neurological symptoms, chest pain, shortness of breath, lightheadedness, dizziness  Other than the mild headache she has no symptoms, as such I will classify this is asymptomatic hypertension, likely secondary to her headache  Of note, no red flag symptoms with the headache  HA was gradual onset  No f/c/s  No neck stiffness  No focal neurological symptoms  No temporal artery pain/tenderness  No vision changes  Headaches are not increasing in severity or frequency  Not worse in the AM  No head trauma  Medical decision making:  Frankie 74-year-old female, mild headache, has gotten better throughout the day, as such, will defer from any imaging, check EKG but otherwise, discharge home with instructions to get some rest, will give her Tylenol ibuprofen, have her follow up with her primary care doctor for recheck of her blood pressure  The patient (and any family present) verbalized understanding of the discharge instructions and warnings that would necessitate return to the Emergency Department  Gave verbal in addition to written discharge instructions  Specifically highlighted areas of special concern regarding the written and verbal discharge instructions and return precautions  All questions were answered prior to discharge  Prior to Admission Medications   Prescriptions Last Dose Informant Patient Reported? Taking?    albuterol (VENTOLIN HFA) 90 mcg/act inhaler  Self Yes Yes   Sig: Inhale 2 puffs every 6 (six) hours as needed   atorvastatin (LIPITOR) 40 mg tablet   No Yes   Sig: take 1 tablet by mouth at bedtime   benzonatate (TESSALON PERLES) 100 mg capsule   No Yes   Sig: Take 1 capsule (100 mg total) by mouth 3 (three) times a day as needed for cough   lisinopril (ZESTRIL) 10 mg tablet   No Yes   Sig: Take 1 tablet (10 mg total) by mouth daily   metFORMIN (GLUCOPHAGE-XR) 500 mg 24 hr tablet   No Yes   Sig: Take 1 tablet (500 mg total) by mouth 2 (two) times a day with meals      Facility-Administered Medications: None       Past Medical History:   Diagnosis Date    Asthmatic bronchitis with exacerbation     Last Assessed: 8/7/2014    GERD (gastroesophageal reflux disease)     Hyperlipidemia     Hypertension     Impaired fasting glucose     Last Assessed: 1/15/2015       Past Surgical History:   Procedure Laterality Date    HAND SURGERY      OTHER SURGICAL HISTORY      circloge    THYROID SURGERY      THYROIDECTOMY, PARTIAL      TUBAL LIGATION      TUMOR REMOVAL      right wrist    UTERINE FIBROID EMBOLIZATION      Last Assessed: 7/25/2014       Family History   Problem Relation Age of Onset    Coronary artery disease Mother     Diabetes Father     Stroke Father         Ischemic    Diabetes Brother      I have reviewed and agree with the history as documented  Social History   Substance Use Topics    Smoking status: Never Smoker    Smokeless tobacco: Never Used    Alcohol use No        Review of Systems   Musculoskeletal: Negative for back pain and neck stiffness  Skin: Negative for color change  All other systems reviewed and are negative  Physical Exam  Physical Exam   Constitutional: She is oriented to person, place, and time  She appears well-developed and well-nourished  HENT:   Head: Normocephalic and atraumatic  Right Ear: External ear normal    Left Ear: External ear normal    Eyes: Conjunctivae and EOM are normal    Neck: Normal range of motion  Neck supple  No JVD present  No tracheal deviation present  Cardiovascular: Normal rate, regular rhythm and normal heart sounds      Pulmonary/Chest: Effort normal  No respiratory distress  She has no wheezes  She has no rales  Abdominal: Soft  Bowel sounds are normal  There is no tenderness  There is no rebound and no guarding  Musculoskeletal: She exhibits no edema or tenderness  Neurological: She is alert and oriented to person, place, and time  Skin: Skin is warm and dry  No rash noted  No erythema  Psychiatric: She has a normal mood and affect  Thought content normal    Nursing note and vitals reviewed  Vital Signs  ED Triage Vitals [10/20/18 2215]   Temperature Pulse Respirations Blood Pressure SpO2   97 5 °F (36 4 °C) 63 16 (!) 210/106 98 %      Temp Source Heart Rate Source Patient Position - Orthostatic VS BP Location FiO2 (%)   Oral Monitor Sitting Left arm --      Pain Score       --           Vitals:    10/20/18 2215   BP: (!) 210/106   Pulse: 63   Patient Position - Orthostatic VS: Sitting       Visual Acuity  Visual Acuity      Most Recent Value   L Pupil Size (mm)  3   R Pupil Size (mm)  3          ED Medications  Medications   acetaminophen (TYLENOL) tablet 975 mg (not administered)   ibuprofen (MOTRIN) tablet 600 mg (not administered)       Diagnostic Studies  Results Reviewed     None                 No orders to display              Procedures  Procedures       Phone Contacts  ED Phone Contact    ED Course                               [facility]  CritCkeo Time    Disposition  Final diagnoses:   None     ED Disposition     None      Follow-up Information    None         Patient's Medications   Discharge Prescriptions    No medications on file     No discharge procedures on file      ED Provider  Electronically Signed by           Chris Ott MD  10/20/18 4069

## 2018-10-21 NOTE — DISCHARGE INSTRUCTIONS

## 2018-10-22 DIAGNOSIS — E55.9 VITAMIN D INSUFFICIENCY: Primary | ICD-10-CM

## 2018-10-22 LAB
ATRIAL RATE: 69 BPM
P AXIS: 33 DEGREES
PR INTERVAL: 210 MS
QRS AXIS: -9 DEGREES
QRSD INTERVAL: 96 MS
QT INTERVAL: 394 MS
QTC INTERVAL: 416 MS
T WAVE AXIS: -4 DEGREES
VENTRICULAR RATE: 67 BPM

## 2018-10-22 PROCEDURE — 93010 ELECTROCARDIOGRAM REPORT: CPT | Performed by: INTERNAL MEDICINE

## 2018-10-22 RX ORDER — MELATONIN
1000 DAILY
Qty: 90 TABLET | Refills: 0 | Status: SHIPPED | OUTPATIENT
Start: 2018-10-22 | End: 2019-05-03 | Stop reason: SDUPTHER

## 2018-11-08 DIAGNOSIS — I10 ESSENTIAL HYPERTENSION: ICD-10-CM

## 2018-11-08 DIAGNOSIS — E78.5 HYPERLIPIDEMIA, UNSPECIFIED HYPERLIPIDEMIA TYPE: ICD-10-CM

## 2018-11-08 DIAGNOSIS — E11.9 TYPE 2 DIABETES MELLITUS WITHOUT COMPLICATION, WITHOUT LONG-TERM CURRENT USE OF INSULIN (HCC): ICD-10-CM

## 2018-11-09 PROCEDURE — 4010F ACE/ARB THERAPY RXD/TAKEN: CPT | Performed by: FAMILY MEDICINE

## 2018-11-09 RX ORDER — METFORMIN HYDROCHLORIDE 500 MG/1
500 TABLET, EXTENDED RELEASE ORAL 2 TIMES DAILY WITH MEALS
Qty: 180 TABLET | Refills: 1 | Status: SHIPPED | OUTPATIENT
Start: 2018-11-09 | End: 2019-05-03 | Stop reason: SDUPTHER

## 2018-11-09 RX ORDER — ATORVASTATIN CALCIUM 40 MG/1
40 TABLET, FILM COATED ORAL
Qty: 90 TABLET | Refills: 1 | Status: SHIPPED | OUTPATIENT
Start: 2018-11-09 | End: 2019-05-03 | Stop reason: SDUPTHER

## 2018-11-09 RX ORDER — LISINOPRIL 10 MG/1
10 TABLET ORAL DAILY
Qty: 90 TABLET | Refills: 1 | Status: SHIPPED | OUTPATIENT
Start: 2018-11-09 | End: 2019-04-18 | Stop reason: SDUPTHER

## 2018-11-13 ENCOUNTER — OFFICE VISIT (OUTPATIENT)
Dept: FAMILY MEDICINE CLINIC | Facility: CLINIC | Age: 53
End: 2018-11-13
Payer: COMMERCIAL

## 2018-11-13 VITALS
SYSTOLIC BLOOD PRESSURE: 140 MMHG | TEMPERATURE: 95.9 F | WEIGHT: 199.4 LBS | HEART RATE: 80 BPM | BODY MASS INDEX: 36.7 KG/M2 | RESPIRATION RATE: 16 BRPM | DIASTOLIC BLOOD PRESSURE: 86 MMHG | HEIGHT: 62 IN

## 2018-11-13 DIAGNOSIS — R07.89 COSTOCHONDRAL CHEST PAIN: Primary | ICD-10-CM

## 2018-11-13 PROCEDURE — 3008F BODY MASS INDEX DOCD: CPT | Performed by: EMERGENCY MEDICINE

## 2018-11-13 PROCEDURE — 99213 OFFICE O/P EST LOW 20 MIN: CPT | Performed by: EMERGENCY MEDICINE

## 2018-11-13 NOTE — ASSESSMENT & PLAN NOTE
- pain likely MSK in origin; no PE/DVT risk factors, cardiac RF include diabetes and HTN  - advised to use ibuprofen instead of Tylenol for better anti-inflammatory properties  - return precautions provided

## 2018-11-13 NOTE — PROGRESS NOTES
Assessment/Plan:    Costochondral chest pain  - pain likely MSK in origin; no PE/DVT risk factors, cardiac RF include diabetes and HTN  - advised to use ibuprofen instead of Tylenol for better anti-inflammatory properties  - return precautions provided          Subjective:      Patient ID: Makenna Vallecillo is a 47 y o  female  Chief Complaint   Patient presents with    Breast Pain       Patient is a 58-year-old female presenting for left chest wall pain  She has a history significant for diabetes on metformin, KIM on CPAP, mild intermittent asthma, and hypertension  Patient states Saturday 11/10/18 she was cooking when she felt a sudden onset of acute sharp left chest wall pain  Patient states that isn't intermittent pain and sometimes radiates from the front to the back of her ribcage, other times it is more focused at the middle of her rib  Patient states that this is exacerbated with deep breathing and the pain sometimes catches her breath  Patient states that normal breathing feels slightly uncomfortable however does not have the sharp pain that accompanies deep breathing  Pain is not reproducible to palpation or position  Patient is use Tylenol at home with some relief  She is a nonsmoker  Patient denies trauma, recent URI, increased use of albuterol p r n  Inhaler  Patient denies fevers, chills, chest pain, nausea, vomiting  The following portions of the patient's history were reviewed and updated as appropriate: allergies, current medications, past family history, past medical history, past social history, past surgical history and problem list     Review of Systems   Constitutional: Negative for chills, diaphoresis and fever  HENT: Negative for congestion, rhinorrhea and sore throat  Respiratory: Negative for cough, chest tightness, shortness of breath and wheezing  Cardiovascular: Negative for chest pain and palpitations     Gastrointestinal: Negative for abdominal pain, nausea and vomiting  Genitourinary: Negative for dysuria  Musculoskeletal: Negative for back pain and neck pain  Skin: Negative for rash and wound  Neurological: Negative for light-headedness and headaches  All other systems reviewed and are negative  Objective:      /86   Pulse 80   Temp (!) 95 9 °F (35 5 °C)   Resp 16   Ht 5' 2" (1 575 m)   Wt 90 4 kg (199 lb 6 4 oz)   LMP 11/27/2017   BMI 36 47 kg/m²          Physical Exam   Constitutional: She is oriented to person, place, and time  She appears well-developed and well-nourished  No distress  HENT:   Head: Normocephalic and atraumatic  Eyes: Conjunctivae and EOM are normal  Right eye exhibits no discharge  Left eye exhibits no discharge  Neck: Normal range of motion  Cardiovascular: Normal rate, regular rhythm and normal heart sounds  No murmur heard  Pulmonary/Chest: Effort normal and breath sounds normal  No stridor  No respiratory distress  She has no wheezes  She has no rales  She exhibits no tenderness  Left chest wall pain non purpuric reproducible to palpation  No rashes noted  Forty to five skin tags underneath left breast    Abdominal: Soft  She exhibits no distension  There is no tenderness  Musculoskeletal: Normal range of motion  She exhibits no deformity  Neurological: She is alert and oriented to person, place, and time  No cranial nerve deficit  No gross motor or coordination deficits  Skin: Skin is warm  No rash noted  She is not diaphoretic  No erythema

## 2018-11-30 LAB
LEFT EYE DIABETIC RETINOPATHY: NORMAL
RIGHT EYE DIABETIC RETINOPATHY: NORMAL
SEVERITY (EYE EXAM): NORMAL

## 2019-02-25 ENCOUNTER — TELEPHONE (OUTPATIENT)
Dept: FAMILY MEDICINE CLINIC | Facility: CLINIC | Age: 54
End: 2019-02-25

## 2019-02-25 DIAGNOSIS — K21.9 GASTROESOPHAGEAL REFLUX DISEASE, ESOPHAGITIS PRESENCE NOT SPECIFIED: Primary | ICD-10-CM

## 2019-02-25 RX ORDER — PANTOPRAZOLE SODIUM 20 MG/1
20 TABLET, DELAYED RELEASE ORAL DAILY
Qty: 60 TABLET | Refills: 0 | Status: SHIPPED | OUTPATIENT
Start: 2019-02-25 | End: 2019-05-03 | Stop reason: SDUPTHER

## 2019-04-18 ENCOUNTER — OFFICE VISIT (OUTPATIENT)
Dept: FAMILY MEDICINE CLINIC | Facility: CLINIC | Age: 54
End: 2019-04-18

## 2019-04-18 VITALS
TEMPERATURE: 98.1 F | RESPIRATION RATE: 16 BRPM | OXYGEN SATURATION: 96 % | WEIGHT: 205.8 LBS | HEIGHT: 62 IN | HEART RATE: 78 BPM | SYSTOLIC BLOOD PRESSURE: 160 MMHG | BODY MASS INDEX: 37.87 KG/M2 | DIASTOLIC BLOOD PRESSURE: 95 MMHG

## 2019-04-18 DIAGNOSIS — I10 ESSENTIAL HYPERTENSION: ICD-10-CM

## 2019-04-18 DIAGNOSIS — J45.21 MILD INTERMITTENT ASTHMA WITH EXACERBATION: Primary | ICD-10-CM

## 2019-04-18 PROCEDURE — 99213 OFFICE O/P EST LOW 20 MIN: CPT | Performed by: FAMILY MEDICINE

## 2019-04-18 RX ORDER — PREDNISONE 20 MG/1
40 TABLET ORAL DAILY
Qty: 10 TABLET | Refills: 0 | Status: SHIPPED | OUTPATIENT
Start: 2019-04-18 | End: 2019-04-23

## 2019-04-18 RX ORDER — LISINOPRIL 20 MG/1
10 TABLET ORAL DAILY
Qty: 30 TABLET | Refills: 0 | Status: SHIPPED | OUTPATIENT
Start: 2019-04-18 | End: 2019-04-18 | Stop reason: SDUPTHER

## 2019-04-18 RX ORDER — LISINOPRIL 20 MG/1
20 TABLET ORAL DAILY
Qty: 30 TABLET | Refills: 0 | Status: SHIPPED | OUTPATIENT
Start: 2019-04-18 | End: 2019-05-03 | Stop reason: SDUPTHER

## 2019-04-18 RX ORDER — ALBUTEROL SULFATE 90 UG/1
2 AEROSOL, METERED RESPIRATORY (INHALATION) EVERY 4 HOURS PRN
Qty: 1 INHALER | Refills: 0 | Status: SHIPPED | OUTPATIENT
Start: 2019-04-18 | End: 2020-05-11 | Stop reason: SDUPTHER

## 2019-04-19 ENCOUNTER — TELEPHONE (OUTPATIENT)
Dept: FAMILY MEDICINE CLINIC | Facility: CLINIC | Age: 54
End: 2019-04-19

## 2019-04-19 DIAGNOSIS — Z20.828 EXPOSURE TO INFLUENZA: Primary | ICD-10-CM

## 2019-04-19 RX ORDER — OSELTAMIVIR PHOSPHATE 75 MG/1
75 CAPSULE ORAL DAILY
Qty: 10 CAPSULE | Refills: 0 | Status: SHIPPED | OUTPATIENT
Start: 2019-04-19 | End: 2019-04-29

## 2019-05-03 ENCOUNTER — OFFICE VISIT (OUTPATIENT)
Dept: FAMILY MEDICINE CLINIC | Facility: CLINIC | Age: 54
End: 2019-05-03

## 2019-05-03 DIAGNOSIS — E66.9 OBESITY (BMI 30-39.9): ICD-10-CM

## 2019-05-03 DIAGNOSIS — E11.9 TYPE 2 DIABETES MELLITUS WITHOUT COMPLICATION, WITHOUT LONG-TERM CURRENT USE OF INSULIN (HCC): ICD-10-CM

## 2019-05-03 DIAGNOSIS — I10 ESSENTIAL HYPERTENSION: Primary | ICD-10-CM

## 2019-05-03 DIAGNOSIS — K21.9 GASTROESOPHAGEAL REFLUX DISEASE, ESOPHAGITIS PRESENCE NOT SPECIFIED: ICD-10-CM

## 2019-05-03 DIAGNOSIS — E78.5 HYPERLIPIDEMIA, UNSPECIFIED HYPERLIPIDEMIA TYPE: ICD-10-CM

## 2019-05-03 DIAGNOSIS — Z12.39 SCREENING BREAST EXAMINATION: ICD-10-CM

## 2019-05-03 DIAGNOSIS — E55.9 VITAMIN D INSUFFICIENCY: ICD-10-CM

## 2019-05-03 PROCEDURE — 99213 OFFICE O/P EST LOW 20 MIN: CPT | Performed by: FAMILY MEDICINE

## 2019-05-03 RX ORDER — MELATONIN
1000 DAILY
Qty: 90 TABLET | Refills: 0 | Status: SHIPPED | OUTPATIENT
Start: 2019-05-03 | End: 2019-06-18 | Stop reason: SDUPTHER

## 2019-05-03 RX ORDER — METFORMIN HYDROCHLORIDE 500 MG/1
500 TABLET, EXTENDED RELEASE ORAL 2 TIMES DAILY WITH MEALS
Qty: 180 TABLET | Refills: 1 | Status: SHIPPED | OUTPATIENT
Start: 2019-05-03 | End: 2019-11-14 | Stop reason: SDUPTHER

## 2019-05-03 RX ORDER — ATORVASTATIN CALCIUM 40 MG/1
40 TABLET, FILM COATED ORAL
Qty: 90 TABLET | Refills: 1 | Status: SHIPPED | OUTPATIENT
Start: 2019-05-03 | End: 2019-11-14 | Stop reason: SDUPTHER

## 2019-05-03 RX ORDER — PANTOPRAZOLE SODIUM 20 MG/1
20 TABLET, DELAYED RELEASE ORAL DAILY
Qty: 60 TABLET | Refills: 0 | Status: SHIPPED | OUTPATIENT
Start: 2019-05-03 | End: 2019-05-17 | Stop reason: SDUPTHER

## 2019-05-03 RX ORDER — LISINOPRIL 20 MG/1
20 TABLET ORAL DAILY
Qty: 30 TABLET | Refills: 0 | Status: SHIPPED | OUTPATIENT
Start: 2019-05-03 | End: 2019-05-17 | Stop reason: SDUPTHER

## 2019-05-05 ENCOUNTER — PATIENT MESSAGE (OUTPATIENT)
Dept: FAMILY MEDICINE CLINIC | Facility: CLINIC | Age: 54
End: 2019-05-05

## 2019-05-05 VITALS
RESPIRATION RATE: 16 BRPM | HEART RATE: 72 BPM | TEMPERATURE: 97.8 F | BODY MASS INDEX: 37.35 KG/M2 | DIASTOLIC BLOOD PRESSURE: 102 MMHG | SYSTOLIC BLOOD PRESSURE: 158 MMHG | WEIGHT: 204.2 LBS

## 2019-05-05 PROBLEM — R07.89 COSTOCHONDRAL CHEST PAIN: Status: RESOLVED | Noted: 2018-11-13 | Resolved: 2019-05-05

## 2019-05-05 PROBLEM — Z12.39 SCREENING BREAST EXAMINATION: Status: ACTIVE | Noted: 2019-05-05

## 2019-05-05 PROBLEM — E66.9 OBESITY (BMI 30-39.9): Status: ACTIVE | Noted: 2019-05-05

## 2019-05-16 ENCOUNTER — TELEPHONE (OUTPATIENT)
Dept: FAMILY MEDICINE CLINIC | Facility: CLINIC | Age: 54
End: 2019-05-16

## 2019-05-17 DIAGNOSIS — K21.9 GASTROESOPHAGEAL REFLUX DISEASE, ESOPHAGITIS PRESENCE NOT SPECIFIED: ICD-10-CM

## 2019-05-17 DIAGNOSIS — I10 ESSENTIAL HYPERTENSION: ICD-10-CM

## 2019-05-17 RX ORDER — LISINOPRIL 20 MG/1
20 TABLET ORAL DAILY
Qty: 30 TABLET | Refills: 0 | Status: SHIPPED | OUTPATIENT
Start: 2019-05-17 | End: 2019-06-18 | Stop reason: SDUPTHER

## 2019-05-17 RX ORDER — PANTOPRAZOLE SODIUM 20 MG/1
20 TABLET, DELAYED RELEASE ORAL DAILY
Qty: 60 TABLET | Refills: 0 | Status: SHIPPED | OUTPATIENT
Start: 2019-05-17 | End: 2019-06-18 | Stop reason: SDUPTHER

## 2019-06-18 DIAGNOSIS — E55.9 VITAMIN D INSUFFICIENCY: ICD-10-CM

## 2019-06-18 DIAGNOSIS — K21.9 GASTROESOPHAGEAL REFLUX DISEASE, ESOPHAGITIS PRESENCE NOT SPECIFIED: ICD-10-CM

## 2019-06-18 DIAGNOSIS — I10 ESSENTIAL HYPERTENSION: ICD-10-CM

## 2019-06-22 RX ORDER — PANTOPRAZOLE SODIUM 20 MG/1
20 TABLET, DELAYED RELEASE ORAL DAILY
Qty: 60 TABLET | Refills: 0 | Status: SHIPPED | OUTPATIENT
Start: 2019-06-22 | End: 2019-07-17 | Stop reason: SDUPTHER

## 2019-06-22 RX ORDER — LISINOPRIL 20 MG/1
20 TABLET ORAL DAILY
Qty: 30 TABLET | Refills: 0 | Status: SHIPPED | OUTPATIENT
Start: 2019-06-22 | End: 2019-08-13 | Stop reason: SDUPTHER

## 2019-06-22 RX ORDER — MELATONIN
1000 DAILY
Qty: 90 TABLET | Refills: 3 | Status: SHIPPED | OUTPATIENT
Start: 2019-06-22 | End: 2019-07-17 | Stop reason: SDUPTHER

## 2019-06-24 ENCOUNTER — TELEPHONE (OUTPATIENT)
Dept: FAMILY MEDICINE CLINIC | Facility: CLINIC | Age: 54
End: 2019-06-24

## 2019-06-24 NOTE — TELEPHONE ENCOUNTER
Pt needs prior auth on sodium 20mg   Go to [employer] and click "enter a key"  Key: [id number]  Patient last name: Vitor Kit: 1965

## 2019-06-24 NOTE — TELEPHONE ENCOUNTER
Patient's med list has no sodium on it, there is Lisinopril 20mg, checked ins formula list, on formula

## 2019-06-28 ENCOUNTER — TELEPHONE (OUTPATIENT)
Dept: FAMILY MEDICINE CLINIC | Facility: CLINIC | Age: 54
End: 2019-06-28

## 2019-07-17 DIAGNOSIS — E55.9 VITAMIN D INSUFFICIENCY: ICD-10-CM

## 2019-07-17 DIAGNOSIS — K21.9 GASTROESOPHAGEAL REFLUX DISEASE, ESOPHAGITIS PRESENCE NOT SPECIFIED: ICD-10-CM

## 2019-07-17 NOTE — TELEPHONE ENCOUNTER
----- Message from Damon How sent at 7/17/2019 11:20 AM EDT -----  Regarding: Prescription Question  Contact: [phone]  I need a refill of protonix and vitamin D as well please    Thank you!     Sera Hahn

## 2019-07-19 ENCOUNTER — TELEPHONE (OUTPATIENT)
Dept: FAMILY MEDICINE CLINIC | Facility: CLINIC | Age: 54
End: 2019-07-19

## 2019-07-20 RX ORDER — MELATONIN
1000 DAILY
Qty: 90 TABLET | Refills: 3 | Status: SHIPPED | OUTPATIENT
Start: 2019-07-20 | End: 2019-10-21 | Stop reason: SDUPTHER

## 2019-07-20 RX ORDER — PANTOPRAZOLE SODIUM 20 MG/1
20 TABLET, DELAYED RELEASE ORAL DAILY
Qty: 90 TABLET | Refills: 0 | Status: SHIPPED | OUTPATIENT
Start: 2019-07-20 | End: 2019-10-21 | Stop reason: SDUPTHER

## 2019-07-22 ENCOUNTER — TELEPHONE (OUTPATIENT)
Dept: FAMILY MEDICINE CLINIC | Facility: CLINIC | Age: 54
End: 2019-07-22

## 2019-07-26 ENCOUNTER — TELEPHONE (OUTPATIENT)
Dept: FAMILY MEDICINE CLINIC | Facility: CLINIC | Age: 54
End: 2019-07-26

## 2019-08-13 ENCOUNTER — OFFICE VISIT (OUTPATIENT)
Dept: FAMILY MEDICINE CLINIC | Facility: CLINIC | Age: 54
End: 2019-08-13

## 2019-08-13 VITALS
TEMPERATURE: 96.4 F | HEART RATE: 76 BPM | BODY MASS INDEX: 38.52 KG/M2 | WEIGHT: 210.6 LBS | SYSTOLIC BLOOD PRESSURE: 160 MMHG | DIASTOLIC BLOOD PRESSURE: 100 MMHG | RESPIRATION RATE: 16 BRPM

## 2019-08-13 DIAGNOSIS — I10 ESSENTIAL HYPERTENSION: ICD-10-CM

## 2019-08-13 DIAGNOSIS — M54.2 NECK PAIN: Primary | ICD-10-CM

## 2019-08-13 DIAGNOSIS — E11.9 TYPE 2 DIABETES MELLITUS WITHOUT COMPLICATION, WITHOUT LONG-TERM CURRENT USE OF INSULIN (HCC): ICD-10-CM

## 2019-08-13 LAB — SL AMB POCT HEMOGLOBIN AIC: 6.2 (ref ?–6.5)

## 2019-08-13 PROCEDURE — 3044F HG A1C LEVEL LT 7.0%: CPT | Performed by: FAMILY MEDICINE

## 2019-08-13 PROCEDURE — 99213 OFFICE O/P EST LOW 20 MIN: CPT | Performed by: FAMILY MEDICINE

## 2019-08-13 PROCEDURE — 83036 HEMOGLOBIN GLYCOSYLATED A1C: CPT | Performed by: FAMILY MEDICINE

## 2019-08-13 RX ORDER — CYCLOBENZAPRINE HCL 10 MG
10 TABLET ORAL 3 TIMES DAILY PRN
Qty: 30 TABLET | Refills: 0 | Status: SHIPPED | OUTPATIENT
Start: 2019-08-13 | End: 2021-01-04 | Stop reason: ALTCHOICE

## 2019-08-13 RX ORDER — NAPROXEN 500 MG/1
500 TABLET ORAL 2 TIMES DAILY WITH MEALS
Qty: 20 TABLET | Refills: 0 | Status: ON HOLD | OUTPATIENT
Start: 2019-08-13 | End: 2020-06-15 | Stop reason: ALTCHOICE

## 2019-08-13 RX ORDER — LISINOPRIL AND HYDROCHLOROTHIAZIDE 20; 12.5 MG/1; MG/1
1 TABLET ORAL DAILY
Qty: 30 TABLET | Refills: 0 | Status: SHIPPED | OUTPATIENT
Start: 2019-08-13 | End: 2019-08-23 | Stop reason: ALTCHOICE

## 2019-08-13 NOTE — ASSESSMENT & PLAN NOTE
Lab Results   Component Value Date    HGBA1C 6 2 08/13/2019     Well controlled on Metformin 500 mg PO BID  Logs unavailable  Continue current regimen  Patient counseled  RTC as scheduled

## 2019-08-13 NOTE — ASSESSMENT & PLAN NOTE
BP Readings from Last 3 Encounters:   08/13/19 160/100   05/03/19 (!) 158/102   04/18/19 160/95     Poorly controlled  Asymptomatic  Patient non-compliant with ambulatory BP logs as previously advised by PCP  Amenable to switching from Lisinopril 20 mg PO daily to combination Lisinopril-HCTZ 20-12 5 1 tab PO daily today for better BP control  Rx provided x30 tabs  BMP to be obtained 1-2 weeks after starting new medication  Patient counseled, including diet, and ambulatory BP logs  Lab slips including BMP, TSH and FLP, Alb/cr printed, given to patient (from previous visit that's not been obtained yet)  RTC 2 weeks for BP recheck

## 2019-08-13 NOTE — ASSESSMENT & PLAN NOTE
Improved  Likely MSK, muscle spasm vs  Myofascial pain  Unlikely to be infectious in origin  No evidence of cervical spine injury or instability  No ENT involvement or FND  Rx for Naproxen 500 mg PO BID  Flexeril PRN  OMT discussed with patient  Patient counseled    RTC PRN

## 2019-08-13 NOTE — PROGRESS NOTES
Assessment/Plan: Andres Alvares is a 46 y o  female with:   Problem List Items Addressed This Visit        Endocrine    Type 2 diabetes mellitus ([facility] [address] ) - Primary     Lab Results   Component Value Date    HGBA1C 6 2 08/13/2019     Well controlled on Metformin 500 mg PO BID  Logs unavailable  Continue current regimen  Patient counseled  RTC as scheduled  Relevant Orders    POCT hemoglobin A1c (Completed)    Microalbumin / creatinine urine ratio       Cardiovascular and Mediastinum    Hypertension     BP Readings from Last 3 Encounters:   08/13/19 160/100   05/03/19 (!) 158/102   04/18/19 160/95     Poorly controlled  Asymptomatic  Patient non-compliant with ambulatory BP logs as previously advised by PCP  Amenable to switching from Lisinopril 20 mg PO daily to combination Lisinopril-HCTZ 20-12 5 1 tab PO daily today for better BP control  Rx provided x30 tabs  BMP to be obtained 1-2 weeks after starting new medication  Patient counseled, including diet, and ambulatory BP logs  Lab slips including BMP, TSH and FLP, Alb/cr printed, given to patient (from previous visit that's not been obtained yet)  RTC 2 weeks for BP recheck  Relevant Medications    lisinopril-hydrochlorothiazide (PRINZIDE,ZESTORETIC) 20-12 5 MG per tablet    Other Relevant Orders    Microalbumin / creatinine urine ratio       Other    Neck pain     Improved  Likely MSK, muscle spasm vs  Myofascial pain  Unlikely to be infectious in origin  No evidence of cervical spine injury or instability  No ENT involvement or FND  Rx for Naproxen 500 mg PO BID  Flexeril PRN  OMT discussed with patient  Patient counseled    RTC PRN         Relevant Medications    naproxen (NAPROSYN) 500 mg tablet    cyclobenzaprine (FLEXERIL) 10 mg tablet        Chief Complaint:  Chief Complaint   Patient presents with    Neck Pain    Hypertension     HPI:   Andres Alvares is a 46 y o  female is here for evaluation of dull constant neck pain x 1 week, right sided (along distribution of R trapezius) possibly from overexerting at [employer]  Aggravated by R arm movement  It improved with ice pack and Naproxen, now nearly resolved  No direct trauma  Patient denies fevers, chills, headaches, numbness, tingling, weakness, poor balance, bowel or bladder incontinence  No overlying skin changes/rash  No involvement of eye/vision, ears/hearing or URI symptoms  HTN - Compliant with Lisinopril 20 mg PO daily, exercise  Non compliant with diet, but improving  Patient denies chest pain, palpitations, sob, nausea, calf pain or lower extremity edema  DM2 - Compliant with Metformin 500 mg PO BID, exercise but not diabetic diet  Patient denies hyper or hypoglycemic symptoms  Serum glucose logs unavailable  Patient denies any numbness/tingling of extremities, recent changes in vision       History:  Current Outpatient Medications   Medication Sig Dispense Refill    albuterol (VENTOLIN HFA) 90 mcg/act inhaler Inhale 2 puffs every 4 (four) hours as needed for wheezing or shortness of breath 1 Inhaler 0    atorvastatin (LIPITOR) 40 mg tablet Take 1 tablet (40 mg total) by mouth daily at bedtime 90 tablet 1    cholecalciferol (VITAMIN D3) 1,000 units tablet Take 1 tablet (1,000 Units total) by mouth daily 90 tablet 3    metFORMIN (GLUCOPHAGE-XR) 500 mg 24 hr tablet Take 1 tablet (500 mg total) by mouth 2 (two) times a day with meals 180 tablet 1    pantoprazole (PROTONIX) 20 mg tablet Take 1 tablet (20 mg total) by mouth daily 90 tablet 0    cyclobenzaprine (FLEXERIL) 10 mg tablet Take 1 tablet (10 mg total) by mouth 3 (three) times a day as needed for muscle spasms 30 tablet 0    lisinopril-hydrochlorothiazide (PRINZIDE,ZESTORETIC) 20-12 5 MG per tablet Take 1 tablet by mouth daily 30 tablet 0    naproxen (NAPROSYN) 500 mg tablet Take 1 tablet (500 mg total) by mouth 2 (two) times a day with meals for 10 days 20 tablet 0     No current facility-administered medications for this visit  PMH reviewed  ROS:  As Per HPI    Physical Exam:  /100 (BP Location: Left arm, Patient Position: Sitting, Cuff Size: Large)   Pulse 76   Temp (!) 96 4 °F (35 8 °C) (Tympanic)   Resp 16   Wt 95 5 kg (210 lb 9 6 oz)   LMP 11/27/2017   BMI 38 52 kg/m²   Physical Exam   Constitutional: She appears well-developed and well-nourished  No distress  HENT:   Head: Normocephalic and atraumatic  Right Ear: External ear normal    Left Ear: External ear normal    Nose: Nose normal    Mouth/Throat: Oropharynx is clear and moist    Eyes: Conjunctivae and EOM are normal  Right eye exhibits no discharge  Left eye exhibits no discharge  Neck: Normal range of motion  Neck supple  No tenderness to palpation at the cervical spine or deformities  Mild tenderness to palpation distribution of right trapezius  ROM and strength intact at the neck and shoulders bilaterally  Mild discomfort with neck flexion and right shoulder movement  No meningeal sign  Strength and sensation intact and symmetrical in b/l Upper extremities on gross examination  Cardiovascular: Normal rate, regular rhythm and normal heart sounds  No murmur heard  Pulmonary/Chest: Effort normal and breath sounds normal  No respiratory distress  She has no wheezes  Abdominal: Soft  Normal appearance and bowel sounds are normal  She exhibits no distension  There is no tenderness  There is no guarding  Musculoskeletal: Normal range of motion  She exhibits no edema  Lymphadenopathy:     She has no cervical adenopathy  Neurological: She is alert  Skin: Skin is warm and dry  She is not diaphoretic  Psychiatric: She has a normal mood and affect  Her behavior is normal    Vitals reviewed        Future Appointments   Date Time Provider Delon Rosenberg   8/15/2019  9:10 AM OSTEOPATHIC RESIDENT [facility] J[facility] Barrientos   8/23/2019  9:50 AM MD [facility] J[facility] Villasenor MD

## 2019-08-22 ENCOUNTER — TRANSCRIBE ORDERS (OUTPATIENT)
Dept: LAB | Facility: HOSPITAL | Age: 54
End: 2019-08-22

## 2019-08-22 ENCOUNTER — APPOINTMENT (OUTPATIENT)
Dept: LAB | Facility: CLINIC | Age: 54
End: 2019-08-22
Payer: COMMERCIAL

## 2019-08-22 DIAGNOSIS — E11.9 TYPE 2 DIABETES MELLITUS WITHOUT COMPLICATION, WITHOUT LONG-TERM CURRENT USE OF INSULIN (HCC): ICD-10-CM

## 2019-08-22 DIAGNOSIS — E78.5 HYPERLIPIDEMIA, UNSPECIFIED HYPERLIPIDEMIA TYPE: Primary | ICD-10-CM

## 2019-08-22 DIAGNOSIS — E08.8 DIABETES MELLITUS DUE TO UNDERLYING CONDITION WITH COMPLICATION, UNSPECIFIED WHETHER LONG TERM INSULIN USE: Primary | ICD-10-CM

## 2019-08-22 DIAGNOSIS — E78.5 HYPERLIPIDEMIA, UNSPECIFIED HYPERLIPIDEMIA TYPE: ICD-10-CM

## 2019-08-22 DIAGNOSIS — E66.9 OBESITY (BMI 30-39.9): ICD-10-CM

## 2019-08-22 DIAGNOSIS — I10 ESSENTIAL HYPERTENSION: ICD-10-CM

## 2019-08-22 LAB
ALBUMIN SERPL BCP-MCNC: 3.7 G/DL (ref 3.5–5)
ALP SERPL-CCNC: 81 U/L (ref 46–116)
ALT SERPL W P-5'-P-CCNC: 32 U/L (ref 12–78)
ANION GAP SERPL CALCULATED.3IONS-SCNC: 12 MMOL/L (ref 4–13)
AST SERPL W P-5'-P-CCNC: 24 U/L (ref 5–45)
BILIRUB SERPL-MCNC: 0.3 MG/DL (ref 0.2–1)
BUN SERPL-MCNC: 15 MG/DL (ref 5–25)
CALCIUM SERPL-MCNC: 9 MG/DL (ref 8.3–10.1)
CHLORIDE SERPL-SCNC: 103 MMOL/L (ref 100–108)
CHOLEST SERPL-MCNC: 160 MG/DL (ref 50–200)
CO2 SERPL-SCNC: 26 MMOL/L (ref 21–32)
CREAT SERPL-MCNC: 0.66 MG/DL (ref 0.6–1.3)
CREAT UR-MCNC: 46.6 MG/DL
GFR SERPL CREATININE-BSD FRML MDRD: 100 ML/MIN/1.73SQ M
GLUCOSE P FAST SERPL-MCNC: 143 MG/DL (ref 65–99)
HDLC SERPL-MCNC: 68 MG/DL (ref 40–60)
LDLC SERPL CALC-MCNC: 73 MG/DL (ref 0–100)
MICROALBUMIN UR-MCNC: 12.3 MG/L (ref 0–20)
MICROALBUMIN/CREAT 24H UR: 26 MG/G CREATININE (ref 0–30)
NONHDLC SERPL-MCNC: 92 MG/DL
POTASSIUM SERPL-SCNC: 3.6 MMOL/L (ref 3.5–5.3)
PROT SERPL-MCNC: 7.1 G/DL (ref 6.4–8.2)
SODIUM SERPL-SCNC: 141 MMOL/L (ref 136–145)
T4 FREE SERPL-MCNC: 0.89 NG/DL (ref 0.76–1.46)
TRIGL SERPL-MCNC: 97 MG/DL
TSH SERPL DL<=0.05 MIU/L-ACNC: 4.71 UIU/ML (ref 0.36–3.74)

## 2019-08-22 PROCEDURE — 36415 COLL VENOUS BLD VENIPUNCTURE: CPT

## 2019-08-22 PROCEDURE — 3061F NEG MICROALBUMINURIA REV: CPT | Performed by: FAMILY MEDICINE

## 2019-08-22 PROCEDURE — 80061 LIPID PANEL: CPT

## 2019-08-22 PROCEDURE — 84439 ASSAY OF FREE THYROXINE: CPT

## 2019-08-22 PROCEDURE — 84443 ASSAY THYROID STIM HORMONE: CPT

## 2019-08-22 PROCEDURE — 82043 UR ALBUMIN QUANTITATIVE: CPT

## 2019-08-22 PROCEDURE — 80053 COMPREHEN METABOLIC PANEL: CPT

## 2019-08-22 PROCEDURE — 82570 ASSAY OF URINE CREATININE: CPT

## 2019-08-23 ENCOUNTER — OFFICE VISIT (OUTPATIENT)
Dept: FAMILY MEDICINE CLINIC | Facility: CLINIC | Age: 54
End: 2019-08-23

## 2019-08-23 VITALS
HEIGHT: 63 IN | TEMPERATURE: 98.8 F | DIASTOLIC BLOOD PRESSURE: 92 MMHG | SYSTOLIC BLOOD PRESSURE: 150 MMHG | HEART RATE: 72 BPM | BODY MASS INDEX: 37.17 KG/M2 | WEIGHT: 209.8 LBS

## 2019-08-23 DIAGNOSIS — I10 ESSENTIAL HYPERTENSION: Primary | ICD-10-CM

## 2019-08-23 PROCEDURE — 99213 OFFICE O/P EST LOW 20 MIN: CPT | Performed by: FAMILY MEDICINE

## 2019-08-23 RX ORDER — LISINOPRIL AND HYDROCHLOROTHIAZIDE 25; 20 MG/1; MG/1
1 TABLET ORAL DAILY
Qty: 30 TABLET | Refills: 1 | Status: SHIPPED | OUTPATIENT
Start: 2019-08-23 | End: 2019-09-09 | Stop reason: SDUPTHER

## 2019-08-23 NOTE — PROGRESS NOTES
Assessment/Plan: Thu Bowen is a 46 y o  female with:   Problem List Items Addressed This Visit        Cardiovascular and Mediastinum    Hypertension - Primary     BP Readings from Last 3 Encounters:   08/23/19 150/92   08/13/19 160/100   05/03/19 (!) 158/102     Improving  Asymptomatic  Ambulatory BP logs reviewed BP approx 180's-200/100  Asymptomatic  Advised to bring in BP Cuff  Amenable to increasing to Lisinopril-HCTZ 20-25 1 tab PO daily  Patient would like to try this new regimen before starting a second tablet, even though she was on this regimen previously with poor control, but states she's been trying to lose weight and exercise since then  Rx provided x30 tabs  BMP to be obtained 1-2 weeks after starting new medication  Patient counseled, including diet, and ambulatory BP logs  Return/ER precautions reviewed  Lab slips including BMP given to patient  Other labs UTD  RTC 2 weeks for BP recheck  Relevant Medications    lisinopril-hydrochlorothiazide (PRINZIDE,ZESTORETIC) 20-25 MG per tablet    Other Relevant Orders    Basic metabolic panel        Chief Complaint:  Chief Complaint   Patient presents with    Hypertension    blood work     HPI:   Thu Bowen is a 46 y o  female is here for evaluation of HTN - Compliant with meds, diet and exercise  Patient denies dizziness, lightheadedness, chest pain, palpitations, sob, nausea, calf pain or lower extremity edema      History:  Current Outpatient Medications   Medication Sig Dispense Refill    albuterol (VENTOLIN HFA) 90 mcg/act inhaler Inhale 2 puffs every 4 (four) hours as needed for wheezing or shortness of breath 1 Inhaler 0    atorvastatin (LIPITOR) 40 mg tablet Take 1 tablet (40 mg total) by mouth daily at bedtime 90 tablet 1    cholecalciferol (VITAMIN D3) 1,000 units tablet Take 1 tablet (1,000 Units total) by mouth daily 90 tablet 3    cyclobenzaprine (FLEXERIL) 10 mg tablet Take 1 tablet (10 mg total) by mouth 3 (three) times a day as needed for muscle spasms 30 tablet 0    metFORMIN (GLUCOPHAGE-XR) 500 mg 24 hr tablet Take 1 tablet (500 mg total) by mouth 2 (two) times a day with meals 180 tablet 1    naproxen (NAPROSYN) 500 mg tablet Take 1 tablet (500 mg total) by mouth 2 (two) times a day with meals for 10 days 20 tablet 0    pantoprazole (PROTONIX) 20 mg tablet Take 1 tablet (20 mg total) by mouth daily 90 tablet 0    lisinopril-hydrochlorothiazide (PRINZIDE,ZESTORETIC) 20-25 MG per tablet Take 1 tablet by mouth daily 30 tablet 1     No current facility-administered medications for this visit  PMH reviewed  ROS:  As Per HPI    Physical Exam:  /92 (BP Location: Right arm)   Pulse 72   Temp 98 8 °F (37 1 °C)   Ht 5' 3 1" (1 603 m)   Wt 95 2 kg (209 lb 12 8 oz)   LMP 11/27/2017   BMI 37 05 kg/m²   Physical Exam   Constitutional: No distress  Obese   HENT:   Head: Normocephalic and atraumatic  Right Ear: External ear normal    Left Ear: External ear normal    Nose: Nose normal    Mouth/Throat: Oropharynx is clear and moist  No oropharyngeal exudate  Eyes: Conjunctivae are normal  Right eye exhibits no discharge  Left eye exhibits no discharge  No scleral icterus  Neck: No JVD present  No thyromegaly present  Cardiovascular: Normal rate, regular rhythm and normal heart sounds  No murmur heard  Pulmonary/Chest: Effort normal and breath sounds normal  No respiratory distress  She has no wheezes  She has no rales  Abdominal: Soft  Normal appearance and bowel sounds are normal  There is no tenderness  Musculoskeletal: She exhibits no edema  Neurological: She is alert  She exhibits normal muscle tone  Coordination normal    Skin: Skin is warm and dry  Capillary refill takes less than 2 seconds  She is not diaphoretic  Psychiatric: She has a normal mood and affect  Thought content normal    Vitals reviewed          Future Appointments   Date Time Provider Delon Rosenberg   9/6/2019 11:00 AM MD Fercho Prado MD

## 2019-09-08 ENCOUNTER — HOSPITAL ENCOUNTER (EMERGENCY)
Facility: HOSPITAL | Age: 54
Discharge: HOME | End: 2019-09-09
Attending: EMERGENCY MEDICINE | Admitting: EMERGENCY MEDICINE
Payer: COMMERCIAL

## 2019-09-08 DIAGNOSIS — I10 ESSENTIAL HYPERTENSION: Primary | ICD-10-CM

## 2019-09-08 DIAGNOSIS — R79.89 ABNORMAL TSH: ICD-10-CM

## 2019-09-08 DIAGNOSIS — R79.89 ELEVATED TSH: ICD-10-CM

## 2019-09-08 DIAGNOSIS — H11.32 SUBCONJUNCTIVAL HEMATOMA, LEFT: ICD-10-CM

## 2019-09-08 LAB
BASOPHILS # BLD AUTO: 0.01 THOUSANDS/ΜL (ref 0–0.1)
BASOPHILS NFR BLD AUTO: 0 % (ref 0–1)
EOSINOPHIL # BLD AUTO: 0.13 THOUSAND/ΜL (ref 0–0.61)
EOSINOPHIL NFR BLD AUTO: 2 % (ref 0–6)
ERYTHROCYTE [DISTWIDTH] IN BLOOD BY AUTOMATED COUNT: 15.8 % (ref 11.6–15.1)
HCT VFR BLD AUTO: 39.5 % (ref 34.8–46.1)
HGB BLD-MCNC: 12.2 G/DL (ref 11.5–15.4)
IMM GRANULOCYTES # BLD AUTO: 0.01 THOUSAND/UL (ref 0–0.2)
IMM GRANULOCYTES NFR BLD AUTO: 0 % (ref 0–2)
LYMPHOCYTES # BLD AUTO: 1.41 THOUSANDS/ΜL (ref 0.6–4.47)
LYMPHOCYTES NFR BLD AUTO: 25 % (ref 14–44)
MCH RBC QN AUTO: 25.8 PG (ref 26.8–34.3)
MCHC RBC AUTO-ENTMCNC: 30.9 G/DL (ref 31.4–37.4)
MCV RBC AUTO: 84 FL (ref 82–98)
MONOCYTES # BLD AUTO: 0.49 THOUSAND/ΜL (ref 0.17–1.22)
MONOCYTES NFR BLD AUTO: 9 % (ref 4–12)
NEUTROPHILS # BLD AUTO: 3.66 THOUSANDS/ΜL (ref 1.85–7.62)
NEUTS SEG NFR BLD AUTO: 64 % (ref 43–75)
NRBC BLD AUTO-RTO: 0 /100 WBCS
PLATELET # BLD AUTO: 207 THOUSANDS/UL (ref 149–390)
PMV BLD AUTO: 11 FL (ref 8.9–12.7)
RBC # BLD AUTO: 4.73 MILLION/UL (ref 3.81–5.12)
WBC # BLD AUTO: 5.71 THOUSAND/UL (ref 4.31–10.16)

## 2019-09-08 PROCEDURE — 80053 COMPREHEN METABOLIC PANEL: CPT | Performed by: EMERGENCY MEDICINE

## 2019-09-08 PROCEDURE — 84439 ASSAY OF FREE THYROXINE: CPT

## 2019-09-08 PROCEDURE — 84443 ASSAY THYROID STIM HORMONE: CPT | Performed by: EMERGENCY MEDICINE

## 2019-09-08 PROCEDURE — 84484 ASSAY OF TROPONIN QUANT: CPT | Performed by: EMERGENCY MEDICINE

## 2019-09-08 PROCEDURE — 96374 THER/PROPH/DIAG INJ IV PUSH: CPT

## 2019-09-08 PROCEDURE — 99284 EMERGENCY DEPT VISIT MOD MDM: CPT | Performed by: EMERGENCY MEDICINE

## 2019-09-08 PROCEDURE — 36415 COLL VENOUS BLD VENIPUNCTURE: CPT | Performed by: EMERGENCY MEDICINE

## 2019-09-08 PROCEDURE — 99283 EMERGENCY DEPT VISIT LOW MDM: CPT

## 2019-09-08 PROCEDURE — 85730 THROMBOPLASTIN TIME PARTIAL: CPT | Performed by: EMERGENCY MEDICINE

## 2019-09-08 PROCEDURE — 85025 COMPLETE CBC W/AUTO DIFF WBC: CPT | Performed by: EMERGENCY MEDICINE

## 2019-09-08 PROCEDURE — 93005 ELECTROCARDIOGRAM TRACING: CPT

## 2019-09-08 PROCEDURE — 85610 PROTHROMBIN TIME: CPT | Performed by: EMERGENCY MEDICINE

## 2019-09-08 PROCEDURE — 83735 ASSAY OF MAGNESIUM: CPT | Performed by: EMERGENCY MEDICINE

## 2019-09-08 RX ORDER — HYDRALAZINE HYDROCHLORIDE 20 MG/ML
10 INJECTION INTRAMUSCULAR; INTRAVENOUS ONCE
Status: COMPLETED | OUTPATIENT
Start: 2019-09-08 | End: 2019-09-08

## 2019-09-08 RX ADMIN — HYDRALAZINE HYDROCHLORIDE 10 MG: 20 INJECTION INTRAMUSCULAR; INTRAVENOUS at 23:51

## 2019-09-09 ENCOUNTER — OFFICE VISIT (OUTPATIENT)
Dept: FAMILY MEDICINE CLINIC | Facility: CLINIC | Age: 54
End: 2019-09-09

## 2019-09-09 VITALS
DIASTOLIC BLOOD PRESSURE: 67 MMHG | BODY MASS INDEX: 36.98 KG/M2 | RESPIRATION RATE: 18 BRPM | HEART RATE: 80 BPM | OXYGEN SATURATION: 97 % | WEIGHT: 209.44 LBS | SYSTOLIC BLOOD PRESSURE: 145 MMHG | TEMPERATURE: 97.9 F

## 2019-09-09 VITALS
DIASTOLIC BLOOD PRESSURE: 110 MMHG | RESPIRATION RATE: 16 BRPM | WEIGHT: 208.4 LBS | TEMPERATURE: 96.4 F | SYSTOLIC BLOOD PRESSURE: 162 MMHG | HEART RATE: 70 BPM | BODY MASS INDEX: 36.8 KG/M2

## 2019-09-09 DIAGNOSIS — H11.32 SUBCONJUNCTIVAL HEMORRHAGE OF LEFT EYE: ICD-10-CM

## 2019-09-09 DIAGNOSIS — R79.89 ABNORMAL TSH: ICD-10-CM

## 2019-09-09 DIAGNOSIS — I10 ESSENTIAL HYPERTENSION: Primary | ICD-10-CM

## 2019-09-09 DIAGNOSIS — E87.6 HYPOKALEMIA: ICD-10-CM

## 2019-09-09 LAB
ALBUMIN SERPL BCP-MCNC: 3.7 G/DL (ref 3.5–5)
ALP SERPL-CCNC: 81 U/L (ref 46–116)
ALT SERPL W P-5'-P-CCNC: 30 U/L (ref 12–78)
ANION GAP SERPL CALCULATED.3IONS-SCNC: 15 MMOL/L (ref 4–13)
APTT PPP: 36 SECONDS (ref 23–37)
AST SERPL W P-5'-P-CCNC: 18 U/L (ref 5–45)
ATRIAL RATE: 69 BPM
BILIRUB SERPL-MCNC: 0.2 MG/DL (ref 0.2–1)
BUN SERPL-MCNC: 15 MG/DL (ref 5–25)
CALCIUM SERPL-MCNC: 8.7 MG/DL (ref 8.3–10.1)
CHLORIDE SERPL-SCNC: 99 MMOL/L (ref 100–108)
CO2 SERPL-SCNC: 27 MMOL/L (ref 21–32)
CREAT SERPL-MCNC: 0.71 MG/DL (ref 0.6–1.3)
GFR SERPL CREATININE-BSD FRML MDRD: 97 ML/MIN/1.73SQ M
GLUCOSE SERPL-MCNC: 145 MG/DL (ref 65–140)
INR PPP: 0.97 (ref 0.84–1.19)
MAGNESIUM SERPL-MCNC: 1.7 MG/DL (ref 1.6–2.6)
P AXIS: 46 DEGREES
POTASSIUM SERPL-SCNC: 3.2 MMOL/L (ref 3.5–5.3)
PR INTERVAL: 184 MS
PROT SERPL-MCNC: 7.3 G/DL (ref 6.4–8.2)
PROTHROMBIN TIME: 12.3 SECONDS (ref 11.6–14.5)
QRS AXIS: -8 DEGREES
QRSD INTERVAL: 100 MS
QT INTERVAL: 380 MS
QTC INTERVAL: 407 MS
SODIUM SERPL-SCNC: 141 MMOL/L (ref 136–145)
T WAVE AXIS: 32 DEGREES
TROPONIN I SERPL-MCNC: <0.02 NG/ML
TSH SERPL DL<=0.05 MIU/L-ACNC: 5.15 UIU/ML (ref 0.36–3.74)
VENTRICULAR RATE: 69 BPM

## 2019-09-09 PROCEDURE — 99213 OFFICE O/P EST LOW 20 MIN: CPT | Performed by: FAMILY MEDICINE

## 2019-09-09 PROCEDURE — 93010 ELECTROCARDIOGRAM REPORT: CPT | Performed by: INTERNAL MEDICINE

## 2019-09-09 RX ORDER — CARVEDILOL 6.25 MG/1
6.25 TABLET ORAL 2 TIMES DAILY WITH MEALS
Qty: 30 TABLET | Refills: 0 | Status: SHIPPED | OUTPATIENT
Start: 2019-09-09 | End: 2019-09-09 | Stop reason: ALTCHOICE

## 2019-09-09 RX ORDER — POTASSIUM CHLORIDE 20 MEQ/1
40 TABLET, EXTENDED RELEASE ORAL ONCE
Status: COMPLETED | OUTPATIENT
Start: 2019-09-09 | End: 2019-09-09

## 2019-09-09 RX ORDER — DILTIAZEM HYDROCHLORIDE 120 MG/1
120 CAPSULE, EXTENDED RELEASE ORAL DAILY
Qty: 90 CAPSULE | Refills: 1 | Status: SHIPPED | OUTPATIENT
Start: 2019-09-09 | End: 2019-09-16 | Stop reason: ALTCHOICE

## 2019-09-09 RX ORDER — LISINOPRIL AND HYDROCHLOROTHIAZIDE 25; 20 MG/1; MG/1
1 TABLET ORAL DAILY
Qty: 90 TABLET | Refills: 1 | Status: SHIPPED | OUTPATIENT
Start: 2019-09-09 | End: 2019-09-16 | Stop reason: ALTCHOICE

## 2019-09-09 RX ORDER — AMLODIPINE BESYLATE 5 MG/1
5 TABLET ORAL DAILY
Qty: 30 TABLET | Refills: 0 | Status: SHIPPED | OUTPATIENT
Start: 2019-09-09 | End: 2019-09-09

## 2019-09-09 RX ADMIN — POTASSIUM CHLORIDE 40 MEQ: 1500 TABLET, EXTENDED RELEASE ORAL at 00:32

## 2019-09-09 NOTE — ASSESSMENT & PLAN NOTE
- Considering reported blurred vision and eye pain since onset, warrants urgent ophthalmological evaluation  - Advised to make an appointment this week- if difficulty making an appointment advised to call our office and we will call on her behalf

## 2019-09-09 NOTE — ASSESSMENT & PLAN NOTE
- TSH 4 711 T4 0 89 8/22  - TSH 5 150 9/8 in ED, will add free T4 to existing specimen (resulted T4 0 94)  - Likely subclinical hypothyroidism in the setting of partial removal of thyroid glad prior   - Repeat TSH/free T4 in 8 weeks

## 2019-09-09 NOTE — PATIENT INSTRUCTIONS
Potassium Content of Foods List   WHAT YOU NEED TO KNOW:   Potassium is a mineral that is found in most foods  Potassium helps to balance fluids and minerals in your body  It also helps your body maintain a normal blood pressure  Potassium helps your muscles contract and your nerves function normally  You may need to increase or decrease potassium if you have certain health conditions  DISCHARGE INSTRUCTIONS:   Why you may need to change the amount of potassium you eat:   · You may need more potassium  if you have hypokalemia (low potassium levels) or high blood pressure  You may also need more potassium if you are taking diuretics  Diuretics and certain medicines cause your body to lose potassium  · You may need less potassium  in your diet if you have hyperkalemia (high potassium levels) or kidney disease  Potassium content of fruit:  The amount of potassium in milligrams (mg) contained in each fruit or serving of fruit is listed beside the item    · High-potassium foods (more than 200 mg per serving):      ¨ 1 medium banana (425)    ¨ ½ of a papaya (390)    ¨ ½ cup of prune juice (370)    ¨ ¼ cup of raisins (270)    ¨ 1 medium juan (325) or kiwi (240)    ¨ 1 small orange (240) or ½ cup of orange juice (235)    ¨ ½ cup of cubed cantaloupe (215) or diced honeydew melon (200)    ¨ 1 medium pear (200)    · Medium-potassium foods (50 to 200 mg per serving):      ¨ 1 medium peach (185)    ¨ 1 small apple or ½ cup of apple juice (150)    ¨ ½ cup of peaches canned in juice (120)    ¨ ½ cup of canned pineapple (100)    ¨ ½ cup of fresh, sliced strawberries (461)    ¨ ½ cup of watermelon (85)    · Low-potassium foods (less than 50 mg per serving):      ¨ ½ cup of cranberries (45) or cranberry juice cocktail (20)    ¨ ½ cup of nectar of papaya, juan, or pear (35)  Potassium content of vegetables:   · High-potassium foods (more than 200 mg per serving):      ¨ 1 medium baked potato, with skin (925)    ¨ 1 baked medium sweet potato, with skin (450)    ¨ ½ cup of tomato or vegetable juice (275), or 1 medium raw tomato (290)    ¨ ½ cup of mushrooms (280)    ¨ ½ cup of fresh brussels sprouts (250)    ¨ ½ cup of cooked zucchini (220) or winter squash (250)    ¨ ¼ of a medium avocado (245)    ¨ ½ cup of broccoli (230)    · Medium-potassium foods (50 to 200 mg per serving):      ¨ ½ cup of corn (195)    ¨ ½ cup of fresh or cooked carrots (180)    ¨ ½ cup of fresh cauliflower (150)    ¨ ½ cup of asparagus (155)    ¨ ½ cup of canned peas (90)     ¨ 1 cup of lettuce, all types (100)    ¨ ½ cup of fresh green beans (90)    ¨ ½ cup of frozen green beans (85)    ¨ ½ cup of cucumber (80)  Potassium content of protein foods:   · High-potassium foods (more than 200 mg per serving):      ¨ ½ cup of cooked lui beans (400) or lentils (365)    ¨ 1 cup of soy milk (300)    ¨ 3 ounces of baked or broiled salmon (319)    ¨ 3 ounces of roasted turkey, dark meat (250)    ¨ ¼ cup of sunflower seeds (241)    ¨ 3 ounces of cooked lean beef (224)    ¨ 2 tablespoons of smooth peanut butter (210)    · Medium-potassium foods (50 to 200 mg per serving):      ¨ 1 ounce of salted peanuts, almonds, or cashews (200)    ¨ 1 large egg (60 mg)  Potassium content of dairy foods:   · High-potassium foods (more than 200 mg per serving):      ¨ 6 ounces of yogurt (260 to 435)    ¨ 1 cup of nonfat, low-fat, or whole milk (350 to 380)    · Medium-potassium foods (50 to 200 mg per serving):      ¨ ½ cup of ricotta cheese (154)    ¨ ½ cup of vanilla ice cream (131)    ¨ ½ cup of low-fat (2%) cottage cheese (110)    · Low-potassium foods (less than 50 mg per serving):      ¨ 1 ounce of cheese (20 to 30)    Potassium content of grains:   · 1 slice of white bread (30)    · ½ cup of white or brown rice (50)    · ½ cup of spaghetti or macaroni (30)    · 1 flour or corn tortilla (50)    · 1 four-inch waffle (50)  Potassium content of other foods:   · 1 tablespoon of molasses (295)    · 1½ ounces of chocolate (165)    · Some salt substitutes may contain a high amount of potassium  Check the food label to find the amount of potassium it contains  © 2017 [employer] Information is for End User's use only and may not be sold, redistributed or otherwise used for commercial purposes  All illustrations and images included in CareNotes® are the copyrighted property of [employer] , [employer]  or [employer] Garner  The above information is an  only  It is not intended as medical advice for individual conditions or treatments  Talk to your doctor, nurse or pharmacist before following any medical regimen to see if it is safe and effective for you

## 2019-09-09 NOTE — PROGRESS NOTES
Assessment/Plan:       Problem List Items Addressed This Visit        Cardiovascular and Mediastinum    Hypertension - Primary     - Uncontrolled, /110 with headache similar to prior headaches, normal neurologic exam and no red flag signs/symptoms   - Continue lisinopril/HCTZ 20-25 mg daily  - Discontinue carvedilol   - Start diltiazem  mg daily   - Monitor BP closely at home   - Return/ED precautions discussed in detail  - Close follow-up in 1 week          Relevant Medications    diltiazem (DILACOR XR) 120 MG 24 hr capsule    lisinopril-hydrochlorothiazide (PRINZIDE,ZESTORETIC) 20-25 MG per tablet       Other    Hypokalemia     Lab Results   Component Value Date    K 3 2 (L) 09/08/2019     - K 3 2 in ED, repleted   - Advised to increase potassium intake in diet, provided information about high potassium foods  - Recheck Bmp in 1 week, if persistently low will start potassium supplementation          Relevant Orders    Basic metabolic panel    Abnormal TSH     - TSH 4 711 T4 0 89 8/22  - TSH 5 150 9/8 in ED, will add free T4 to existing specimen (resulted T4 0 94)  - Likely subclinical hypothyroidism in the setting of partial removal of thyroid glad prior   - Repeat TSH/free T4 in 8 weeks          Relevant Orders    T4, free (Completed)    TSH, 3rd generation with Free T4 reflex    Subconjunctival hemorrhage of left eye     - Considering reported blurred vision and eye pain since onset, warrants urgent ophthalmological evaluation  - Advised to make an appointment this week- if difficulty making an appointment advised to call our office and we will call on her behalf           Relevant Orders    Ambulatory referral to Ophthalmology            Subjective:      Patient ID: Bryanna Mcgovern is a 46 y o  female with past medical history significant for GERD, non-insulin dependent type 2 DM, hypertension, and history of portion of thyroid removal (non-cancerous per patient) year ago presenting for follow-up of elevated blood pressure  The patient presents for evaluation of elevated blood pressure  She states that she was seen in the ED last night for uncontrolled hypertension, and has started on a beta-blocker  She was given hydralazine 10 mg the at that time, as well as potassium supplementation 40 mg for a potassium of 3 2  She took a dose of the carvedilol today  BP has been 190-200s/100s today at home, she uses a wrist BP cuff  She has had headache all today, described as the right side of her head, similar to headaches she has had in the past   She denies any chest pain or shortness of breath  She states she was unable to take amlodipine in the past due to leg swelling  She noticed blood of her left eye that she 1st noticed on Saturday, admits to mild blurred vision and eye pain in the left eye  Unchanged since it began  Denies any injury or trauma to the eye  The following portions of the patient's history were reviewed and updated as appropriate: allergies, current medications, past family history, past medical history, past social history, past surgical history and problem list     Review of Systems   Constitutional: Negative for activity change, appetite change, chills, fatigue and fever  HENT: Negative for congestion, ear discharge, ear pain and voice change  Eyes: Positive for pain, redness and visual disturbance  Negative for photophobia, discharge and itching  Respiratory: Negative for cough, chest tightness, shortness of breath and wheezing  Cardiovascular: Negative for chest pain, palpitations and leg swelling  Gastrointestinal: Negative for abdominal pain, constipation, diarrhea, nausea and vomiting  Genitourinary: Negative for decreased urine volume and difficulty urinating  Skin: Negative for pallor and rash  Neurological: Positive for headaches  Negative for dizziness, syncope, facial asymmetry, speech difficulty, weakness, light-headedness and numbness  Psychiatric/Behavioral: Negative for confusion  Objective:      BP (!) 162/110 (BP Location: Left arm, Patient Position: Sitting, Cuff Size: Standard)   Pulse 70   Temp (!) 96 4 °F (35 8 °C) (Tympanic)   Resp 16   Wt 94 5 kg (208 lb 6 4 oz)   LMP 11/27/2017   BMI 36 80 kg/m²          Physical Exam   Constitutional: She is oriented to person, place, and time  She appears well-developed and well-nourished  No distress  HENT:   Head: Normocephalic and atraumatic  Eyes: Pupils are equal, round, and reactive to light  Conjunctivae and EOM are normal  Right eye exhibits no discharge  Left eye exhibits no discharge  No scleral icterus  Subconjunctival hemorrhage medial portion of left eye   Neck: Normal range of motion  Neck supple  Cardiovascular: Normal rate, regular rhythm, normal heart sounds and intact distal pulses  No murmur heard  Pulmonary/Chest: Effort normal and breath sounds normal  No stridor  No respiratory distress  She has no wheezes  She has no rales  Musculoskeletal: She exhibits no edema  Neurological: She is alert and oriented to person, place, and time  No cranial nerve deficit  She exhibits normal muscle tone  Skin: Skin is warm  Capillary refill takes less than 2 seconds  No rash noted  She is not diaphoretic  Psychiatric: She has a normal mood and affect  Her behavior is normal    Vitals reviewed        Mary Lou Oropeza DO PGY-3   Leslie Rowland

## 2019-09-09 NOTE — ASSESSMENT & PLAN NOTE
Lab Results   Component Value Date    K 3 2 (L) 09/08/2019     - K 3 2 in ED, repleted   - Advised to increase potassium intake in diet, provided information about high potassium foods  - Recheck Bmp in 1 week, if persistently low will start potassium supplementation

## 2019-09-09 NOTE — ED PROVIDER NOTES
History  Chief Complaint   Patient presents with    Hypertension     Patient comes to ED stating her BP has been high at home for past couple of days 199/105  Pt also c/o blurry vision to L eye starting yesterday  L eye scalera is bloody  Denies headache or nausea       History provided by:  Patient and medical records   used: No     62-year-old female with history of hypertension sent by [facility] for evaluation of increased hypertension associated with a left subconjunctival hematoma developing yesterday  Otherwise has asymptomatic  Checks blood pressure frequently at home and has been about 190/100  Follows with PCP and her losartan/HCT was recently increased without any effect  Per prior notes this has not been effective in the past either  Denies any lightheadedness, dizziness, headaches, nausea, vomiting, visual changes, shortness of breath or chest pain  Other than the subconjunctival hematoma on the medial aspect of left eye exam is unremarkable  Plan EKG, labs to rule out end-organ damage and will plan antihypertensive  Prior to Admission Medications   Prescriptions Last Dose Informant Patient Reported? Taking?    albuterol (VENTOLIN HFA) 90 mcg/act inhaler More than a month at Unknown time  No No   Sig: Inhale 2 puffs every 4 (four) hours as needed for wheezing or shortness of breath   atorvastatin (LIPITOR) 40 mg tablet 9/8/2019 at Unknown time  No Yes   Sig: Take 1 tablet (40 mg total) by mouth daily at bedtime   cholecalciferol (VITAMIN D3) 1,000 units tablet 9/8/2019 at Unknown time  No Yes   Sig: Take 1 tablet (1,000 Units total) by mouth daily   cyclobenzaprine (FLEXERIL) 10 mg tablet Past Week at Unknown time  No Yes   Sig: Take 1 tablet (10 mg total) by mouth 3 (three) times a day as needed for muscle spasms   lisinopril-hydrochlorothiazide (PRINZIDE,ZESTORETIC) 20-25 MG per tablet 9/8/2019 at Unknown time  No Yes   Sig: Take 1 tablet by mouth daily   metFORMIN (GLUCOPHAGE-XR) 500 mg 24 hr tablet 9/8/2019 at Unknown time  No Yes   Sig: Take 1 tablet (500 mg total) by mouth 2 (two) times a day with meals   naproxen (NAPROSYN) 500 mg tablet More than a month at Unknown time  No No   Sig: Take 1 tablet (500 mg total) by mouth 2 (two) times a day with meals for 10 days   pantoprazole (PROTONIX) 20 mg tablet 9/8/2019 at Unknown time  No Yes   Sig: Take 1 tablet (20 mg total) by mouth daily      Facility-Administered Medications: None       Past Medical History:   Diagnosis Date    Asthmatic bronchitis with exacerbation     Last Assessed: 8/7/2014    Diabetes mellitus ([facility] [address] )     GERD (gastroesophageal reflux disease)     Hyperlipidemia     Hypertension     Impaired fasting glucose     Last Assessed: 1/15/2015       Past Surgical History:   Procedure Laterality Date    HAND SURGERY      OTHER SURGICAL HISTORY      circloge    THYROID SURGERY      THYROIDECTOMY, PARTIAL      TUBAL LIGATION      TUMOR REMOVAL      right wrist    UTERINE FIBROID EMBOLIZATION      Last Assessed: 7/25/2014       Family History   Problem Relation Age of Onset    Coronary artery disease Mother     Diabetes Father     Stroke Father         Ischemic    Diabetes Brother      I have reviewed and agree with the history as documented  Social History     Tobacco Use    Smoking status: Never Smoker    Smokeless tobacco: Never Used   Substance Use Topics    Alcohol use: No    Drug use: No        Review of Systems   Constitutional: Negative for activity change, appetite change and fever  Eyes: Positive for redness  Negative for pain and visual disturbance  Respiratory: Negative for chest tightness and shortness of breath  Cardiovascular: Negative for chest pain  Gastrointestinal: Negative for abdominal pain, nausea and vomiting  Skin: Negative for color change  Neurological: Negative for dizziness and weakness  All other systems reviewed and are negative        Physical Exam  Physical Exam   Constitutional: She is oriented to person, place, and time  She appears well-developed and well-nourished  No distress  HENT:   Head: Normocephalic  Eyes: Pupils are equal, round, and reactive to light  EOM are normal    Left subconjunctival hematoma on the medial aspect  Cardiovascular: Normal rate, regular rhythm and normal heart sounds  Pulmonary/Chest: Effort normal and breath sounds normal    Musculoskeletal: Normal range of motion  She exhibits no edema  Neurological: She is alert and oriented to person, place, and time  Skin: Skin is warm and dry  Psychiatric: She has a normal mood and affect  Her behavior is normal    Nursing note and vitals reviewed  Vital Signs  ED Triage Vitals [09/08/19 2323]   Temperature Pulse Respirations Blood Pressure SpO2   97 9 °F (36 6 °C) 63 18 (!) 205/91 94 %      Temp Source Heart Rate Source Patient Position - Orthostatic VS BP Location FiO2 (%)   Oral Monitor Sitting Right arm --      Pain Score       No Pain           Vitals:    09/08/19 2323 09/09/19 0000 09/09/19 0030   BP: (!) 205/91 163/72 145/67   Pulse: 63 74 80   Patient Position - Orthostatic VS: Sitting Sitting Sitting         Visual Acuity      ED Medications  Medications   hydrALAZINE (APRESOLINE) injection 10 mg (10 mg Intravenous Given 9/8/19 2351)   potassium chloride (K-DUR,KLOR-CON) CR tablet 40 mEq (40 mEq Oral Given 9/9/19 0032)       Diagnostic Studies  Results Reviewed     Procedure Component Value Units Date/Time    TSH [[id number]]  (Abnormal) Collected:  09/08/19 2349    Lab Status:  Final result Specimen:  Blood from Arm, Left Updated:  09/09/19 0023     TSH 3RD Render Maiers 5 150 uIU/mL     Narrative:       Patients undergoing fluorescein dye angiography may retain small amounts of fluorescein in the body for 48-72 hours post procedure  Samples containing fluorescein can produce falsely depressed TSH values   If the patient had this procedure,a specimen should be resubmitted post fluorescein clearance        Magnesium [[id number]]  (Normal) Collected:  09/08/19 2349    Lab Status:  Final result Specimen:  Blood from Arm, Left Updated:  09/09/19 0023     Magnesium 1 7 mg/dL     Troponin I [[id number]]  (Normal) Collected:  09/08/19 2349    Lab Status:  Final result Specimen:  Blood from Arm, Left Updated:  09/09/19 0016     Troponin I <0 02 ng/mL     Comprehensive metabolic panel [[id number]]  (Abnormal) Collected:  09/08/19 2349    Lab Status:  Final result Specimen:  Blood from Arm, Left Updated:  09/09/19 0014     Sodium 141 mmol/L      Potassium 3 2 mmol/L      Chloride 99 mmol/L      CO2 27 mmol/L      ANION GAP 15 mmol/L      BUN 15 mg/dL      Creatinine 0 71 mg/dL      Glucose 145 mg/dL      Calcium 8 7 mg/dL      AST 18 U/L      ALT 30 U/L      Alkaline Phosphatase 81 U/L      Total Protein 7 3 g/dL      Albumin 3 7 g/dL      Total Bilirubin 0 20 mg/dL      eGFR 97 ml/min/1 73sq m     Narrative:       [place] guidelines for Chronic Kidney Disease (CKD):     Stage 1 with normal or high GFR (GFR > 90 mL/min/1 73 square meters)    Stage 2 Mild CKD (GFR = 60-89 mL/min/1 73 square meters)    Stage 3A Moderate CKD (GFR = 45-59 mL/min/1 73 square meters)    Stage 3B Moderate CKD (GFR = 30-44 mL/min/1 73 square meters)    Stage 4 Severe CKD (GFR = 15-29 mL/min/1 73 square meters)    Stage 5 End Stage CKD (GFR <15 mL/min/1 73 square meters)  Note: GFR calculation is accurate only with a steady state creatinine    Protime-INR [[id number]]  (Normal) Collected:  09/08/19 2349    Lab Status:  Final result Specimen:  Blood from Arm, Left Updated:  09/09/19 0007     Protime 12 3 seconds      INR 0 97    APTT [[id number]]  (Normal) Collected:  09/08/19 2349    Lab Status:  Final result Specimen:  Blood from Arm, Left Updated:  09/09/19 0007     PTT 36 seconds     CBC and differential [[id number]]  (Abnormal) Collected:  09/08/19 2349    Lab Status:  Final result Specimen:  Blood from Arm, Left Updated:  09/08/19 8409     WBC 5 71 Thousand/uL      RBC 4 73 Million/uL      Hemoglobin 12 2 g/dL      Hematocrit 39 5 %      MCV 84 fL      MCH 25 8 pg      MCHC 30 9 g/dL      RDW 15 8 %      MPV 11 0 fL      Platelets 560 Thousands/uL      nRBC 0 /100 WBCs      Neutrophils Relative 64 %      Immat GRANS % 0 %      Lymphocytes Relative 25 %      Monocytes Relative 9 %      Eosinophils Relative 2 %      Basophils Relative 0 %      Neutrophils Absolute 3 66 Thousands/µL      Immature Grans Absolute 0 01 Thousand/uL      Lymphocytes Absolute 1 41 Thousands/µL      Monocytes Absolute 0 49 Thousand/µL      Eosinophils Absolute 0 13 Thousand/µL      Basophils Absolute 0 01 Thousands/µL                  No orders to display              Procedures  ECG 12 Lead Documentation Only  Date/Time: 9/8/2019 11:59 PM  Performed by: Penny Andrew MD  Authorized by: Penny Andrew MD     Indications / Diagnosis:  HTN  ECG reviewed by me, the ED Provider: yes    Patient location:  ED  Previous ECG:     Previous ECG:  Compared to current    Comparison ECG info:  10/20/18    Similarity:  No change  Rate:     ECG rate:  69  Rhythm:     Rhythm: sinus rhythm    Ectopy:     Ectopy: none    QRS:     QRS axis:  Normal  Conduction:     Conduction: normal    ST segments:     ST segments:  Normal  T waves:     T waves: normal             ED Course  ED Course as of Sep 09 0116   Mon Sep 09, 2019   0031 Patient had elevated TSH on prior visit, had T4 sent afterwards which was normal    TSH 3RD GENERATON(!): 5 150   0032 Repleted     Potassium(!): 3 2                               MDM  Number of Diagnoses or Management Options  Elevated TSH: established and worsening  Essential hypertension: established and worsening  Subconjunctival hematoma, left: new and does not require workup  Diagnosis management comments: 74-year-old female with history of hypertension on lisinopril/HCTZ recently increased few weeks ago noting ongoing hypertension at home despite increase in medication  Per review of PCP notes she has been on this medication before and has been ineffective in the past   Associated symptoms include a left subconjunctival hematoma  No visual changes or eye discomfort at this time  No evidence of end-organ damage on lab work  Given 1 dose of IV hydralazine in the ED with improvement in pressure  Given script for a beta-blocker  She plans to call PCP/Cardiology tomorrow for follow-up appointment/possible med change  TSH elevated on labs  Per review she had elevated TSH in the past with a normal T4  Amount and/or Complexity of Data Reviewed  Clinical lab tests: ordered and reviewed  Independent visualization of images, tracings, or specimens: yes    Patient Progress  Patient progress: improved      Disposition  Final diagnoses:   Essential hypertension   Subconjunctival hematoma, left   Elevated TSH     Time reflects when diagnosis was documented in both MDM as applicable and the Disposition within this note     Time User Action Codes Description Comment    9/9/2019 12:20 AM Claudetta Reap J Add [I10] Essential hypertension     9/9/2019 12:21 AM Claudetta Reap J Add [H11 32] Subconjunctival hematoma, left     9/9/2019 12:30 AM Elodia Fatima Add [R79 89] Elevated TSH       ED Disposition     ED Disposition Condition Date/Time Comment    Discharge Stable Mon Sep 9, 2019 12:20 AM Christiana De Souza discharge to home/self care              Follow-up Information     Follow up With Specialties Details Why Contact Info Additional [address] [address] Family [facility] Schedule an appointment as soon as possible for a visit   [facility] [id number] [phone] BO XZOOBNTA HKIDNO QLLSSTEP SYJVPRDWQ, [address], [place], [address] [facility] Emergency Medicine  If symptoms worsen [address] [facility] [address] [phone] AN ED, [address], [place], [place], [address]          Discharge Medication List as of 9/9/2019 12:31 AM      START taking these medications    Details   amLODIPine (NORVASC) 5 mg tablet Take 1 tablet (5 mg total) by mouth daily, Starting Mon 9/9/2019, Print         CONTINUE these medications which have NOT CHANGED    Details   atorvastatin (LIPITOR) 40 mg tablet Take 1 tablet (40 mg total) by mouth daily at bedtime, Starting Fri 5/3/2019, Normal      cholecalciferol (VITAMIN D3) 1,000 units tablet Take 1 tablet (1,000 Units total) by mouth daily, Starting Sat 7/20/2019, Normal      cyclobenzaprine (FLEXERIL) 10 mg tablet Take 1 tablet (10 mg total) by mouth 3 (three) times a day as needed for muscle spasms, Starting Tue 8/13/2019, Normal      lisinopril-hydrochlorothiazide (PRINZIDE,ZESTORETIC) 20-25 MG per tablet Take 1 tablet by mouth daily, Starting Fri 8/23/2019, Normal      metFORMIN (GLUCOPHAGE-XR) 500 mg 24 hr tablet Take 1 tablet (500 mg total) by mouth 2 (two) times a day with meals, Starting Fri 5/3/2019, Normal      pantoprazole (PROTONIX) 20 mg tablet Take 1 tablet (20 mg total) by mouth daily, Starting Sat 7/20/2019, Normal      albuterol (VENTOLIN HFA) 90 mcg/act inhaler Inhale 2 puffs every 4 (four) hours as needed for wheezing or shortness of breath, Starting Thu 4/18/2019, Normal      naproxen (NAPROSYN) 500 mg tablet Take 1 tablet (500 mg total) by mouth 2 (two) times a day with meals for 10 days, Starting Tue 8/13/2019, Until Fri 8/23/2019, Normal           No discharge procedures on file      ED Provider  Electronically Signed by           Marjorie Toro MD  09/09/19 8813       Marjorie Toro MD  09/09/19 4216

## 2019-09-10 LAB — T4 FREE SERPL-MCNC: 0.94 NG/DL (ref 0.76–1.46)

## 2019-09-16 ENCOUNTER — OFFICE VISIT (OUTPATIENT)
Dept: FAMILY MEDICINE CLINIC | Facility: CLINIC | Age: 54
End: 2019-09-16

## 2019-09-16 VITALS
RESPIRATION RATE: 20 BRPM | BODY MASS INDEX: 37.21 KG/M2 | TEMPERATURE: 97.3 F | HEIGHT: 63 IN | HEART RATE: 88 BPM | DIASTOLIC BLOOD PRESSURE: 96 MMHG | WEIGHT: 210 LBS | SYSTOLIC BLOOD PRESSURE: 150 MMHG

## 2019-09-16 DIAGNOSIS — I10 ESSENTIAL HYPERTENSION: Primary | ICD-10-CM

## 2019-09-16 PROCEDURE — 99213 OFFICE O/P EST LOW 20 MIN: CPT | Performed by: FAMILY MEDICINE

## 2019-09-16 RX ORDER — DILTIAZEM HYDROCHLORIDE 120 MG/1
120 CAPSULE, EXTENDED RELEASE ORAL DAILY
Qty: 90 CAPSULE | Refills: 1 | Status: SHIPPED | OUTPATIENT
Start: 2019-09-16 | End: 2020-02-25 | Stop reason: SDUPTHER

## 2019-09-16 RX ORDER — LISINOPRIL AND HYDROCHLOROTHIAZIDE 20; 12.5 MG/1; MG/1
2 TABLET ORAL DAILY
Qty: 180 TABLET | Refills: 0 | Status: SHIPPED | OUTPATIENT
Start: 2019-09-16 | End: 2020-05-21 | Stop reason: SDUPTHER

## 2019-09-17 NOTE — PROGRESS NOTES
Assessment/Plan:       Problem List Items Addressed This Visit        Cardiovascular and Mediastinum    Hypertension - Primary     - Uncontrolled, /96 today, repeat 160/104 with goal <140/90   - BP cuff compared to our cuff, wrist cuff unreliable and advised not to use, arm cuff accurate reading 167/109 during visit  - Continue diltiazem  mg daily   - Increase lisinopril/HCTZ 40-25 mg daily   - Spironolactone considered, however allergy alert pops up due to history of allergy to Levaquin; will avoid amlodipine due to significant leg swelling side effect prior   - Monitor BP closely at home   - Advised to completed BMP by end of week  - Return/ED precautions discussed in detail  - Close follow-up in 2 weeks          Relevant Medications    diltiazem (DILACOR XR) 120 MG 24 hr capsule    lisinopril-hydrochlorothiazide (PRINZIDE,ZESTORETIC) 20-12 5 MG per tablet            Subjective:      Patient ID: Hang Gutierrez is a 46 y o  female with past medical history significant for GERD, non-insulin dependent type 2 DM, hypertension, and history of portion of thyroid removal (non-cancerous per patient) year ago presenting for follow-up of hypertension  The patient has her BP log with her today as below:  154/100 arm cuff, 192/109 wrist cuff   170/89 arm cuff  195/101 wrist cuff   201/104 wrist cuff   168/103 arm cuff   175/95 arm cuff     Headache as discussed previous visit has resolved  Tolerating new medication without difficulty  Denies any chest pain, shortness of breath or leg swelling  The following portions of the patient's history were reviewed and updated as appropriate: allergies, current medications, past family history, past medical history, past social history, past surgical history and problem list     Review of Systems   Constitutional: Negative for activity change, appetite change, chills and fever  Respiratory: Negative for cough and shortness of breath      Cardiovascular: Negative for chest pain, palpitations and leg swelling  Genitourinary: Negative for decreased urine volume and difficulty urinating  Neurological: Negative for dizziness, light-headedness and headaches  Objective:      /96 (BP Location: Left arm, Patient Position: Sitting, Cuff Size: Standard)   Pulse 88   Temp (!) 97 3 °F (36 3 °C) (Tympanic)   Resp 20   Ht 5' 3" (1 6 m)   Wt 95 3 kg (210 lb)   LMP 11/27/2017   BMI 37 20 kg/m²          Physical Exam   Constitutional: She is oriented to person, place, and time  She appears well-developed and well-nourished  No distress  HENT:   Head: Normocephalic and atraumatic  Cardiovascular: Normal rate, regular rhythm and normal heart sounds  No murmur heard  Pulmonary/Chest: Effort normal and breath sounds normal  No stridor  No respiratory distress  She has no wheezes  She has no rales  Musculoskeletal: She exhibits no edema  Neurological: She is alert and oriented to person, place, and time  She exhibits normal muscle tone  Skin: Skin is warm  She is not diaphoretic  Psychiatric: She has a normal mood and affect  Her behavior is normal    Vitals reviewed        Daisha Reynoso DO PGY-3   Leslie Rowland

## 2019-09-17 NOTE — ASSESSMENT & PLAN NOTE
- Uncontrolled, /96 today, repeat 160/104 with goal <140/90   - BP cuff compared to our cuff, wrist cuff unreliable and advised not to use, arm cuff accurate reading 167/109 during visit  - Continue diltiazem  mg daily   - Increase lisinopril/HCTZ 40-25 mg daily   - Spironolactone considered, however allergy alert pops up due to history of allergy to Levaquin; will avoid amlodipine due to significant leg swelling side effect prior   - Monitor BP closely at home   - Advised to completed BMP by end of week  - Return/ED precautions discussed in detail  - Close follow-up in 2 weeks

## 2019-09-24 ENCOUNTER — APPOINTMENT (OUTPATIENT)
Dept: LAB | Facility: CLINIC | Age: 54
End: 2019-09-24
Payer: COMMERCIAL

## 2019-09-24 ENCOUNTER — TRANSCRIBE ORDERS (OUTPATIENT)
Dept: LAB | Facility: CLINIC | Age: 54
End: 2019-09-24

## 2019-09-24 DIAGNOSIS — I10 ESSENTIAL HYPERTENSION: ICD-10-CM

## 2019-09-24 DIAGNOSIS — E87.6 HYPOKALEMIA: ICD-10-CM

## 2019-09-24 LAB
ANION GAP SERPL CALCULATED.3IONS-SCNC: 11 MMOL/L (ref 4–13)
BUN SERPL-MCNC: 16 MG/DL (ref 5–25)
CALCIUM SERPL-MCNC: 9.3 MG/DL (ref 8.3–10.1)
CHLORIDE SERPL-SCNC: 105 MMOL/L (ref 100–108)
CO2 SERPL-SCNC: 28 MMOL/L (ref 21–32)
CREAT SERPL-MCNC: 0.74 MG/DL (ref 0.6–1.3)
GFR SERPL CREATININE-BSD FRML MDRD: 92 ML/MIN/1.73SQ M
GLUCOSE P FAST SERPL-MCNC: 155 MG/DL (ref 65–99)
POTASSIUM SERPL-SCNC: 3.7 MMOL/L (ref 3.5–5.3)
SODIUM SERPL-SCNC: 144 MMOL/L (ref 136–145)

## 2019-09-24 PROCEDURE — 36415 COLL VENOUS BLD VENIPUNCTURE: CPT

## 2019-09-24 PROCEDURE — 80048 BASIC METABOLIC PNL TOTAL CA: CPT

## 2019-10-08 ENCOUNTER — OFFICE VISIT (OUTPATIENT)
Dept: FAMILY MEDICINE CLINIC | Facility: CLINIC | Age: 54
End: 2019-10-08

## 2019-10-08 VITALS
WEIGHT: 210 LBS | SYSTOLIC BLOOD PRESSURE: 140 MMHG | TEMPERATURE: 96.7 F | BODY MASS INDEX: 37.21 KG/M2 | RESPIRATION RATE: 16 BRPM | DIASTOLIC BLOOD PRESSURE: 90 MMHG | HEIGHT: 63 IN | HEART RATE: 78 BPM

## 2019-10-08 DIAGNOSIS — G89.29 CHRONIC BILATERAL LOW BACK PAIN WITHOUT SCIATICA: ICD-10-CM

## 2019-10-08 DIAGNOSIS — I10 ESSENTIAL HYPERTENSION: Primary | ICD-10-CM

## 2019-10-08 DIAGNOSIS — Z23 ENCOUNTER FOR IMMUNIZATION: ICD-10-CM

## 2019-10-08 DIAGNOSIS — M54.50 CHRONIC BILATERAL LOW BACK PAIN WITHOUT SCIATICA: ICD-10-CM

## 2019-10-08 PROCEDURE — 1036F TOBACCO NON-USER: CPT | Performed by: FAMILY MEDICINE

## 2019-10-08 PROCEDURE — 99213 OFFICE O/P EST LOW 20 MIN: CPT | Performed by: FAMILY MEDICINE

## 2019-10-08 PROCEDURE — 3008F BODY MASS INDEX DOCD: CPT | Performed by: FAMILY MEDICINE

## 2019-10-08 RX ORDER — CLONIDINE HYDROCHLORIDE 0.1 MG/1
0.1 TABLET ORAL EVERY 12 HOURS SCHEDULED
Qty: 60 TABLET | Refills: 2 | Status: SHIPPED | OUTPATIENT
Start: 2019-10-08 | End: 2020-05-01 | Stop reason: SDUPTHER

## 2019-10-08 NOTE — ASSESSMENT & PLAN NOTE
- Likely lumbar sprain/strain, no red flag signs/symptoms   - Will trial physical therapy, if worsening or persistent symptoms will consider lumbar XR   - Discussed Tylenol, heat, stretching and topical agents for pain relief

## 2019-10-08 NOTE — ASSESSMENT & PLAN NOTE
- Uncontrolled BP ranges at home; today in office the patient's BP was 140/90  - Continue diltiazem XR 120mg daily  - Continue lisinopril/HCTZ 40-25 mg daily  - Start Clonidine 0 1 mg BID, potential side effects discussed  - Advised patient to continue monitoring her BP closely  - Ordered TSH, CMP and Aldosterone/Renin test to evaluate for secondary hypertension  - Patient has known history of mild sleep apnea however could not afford CPAP machine and is not interested in looking into insurance options for coverage or retesting with sleep study at this time- advised this may contribute to continued uncontrolled blood pressure  - Patient is scheduled to see Cardiology on 10/21/19  - Follow up in 2 months

## 2019-10-08 NOTE — PROGRESS NOTES
Assessment/Plan:       Problem List Items Addressed This Visit        Cardiovascular and Mediastinum    Hypertension - Primary     - Uncontrolled BP ranges at home; today in office the patient's BP was 140/90  - Continue diltiazem XR 120mg daily  - Continue lisinopril/HCTZ 40-25 mg daily  - Start Clonidine 0 1 mg BID, potential side effects discussed  - Advised patient to continue monitoring her BP closely  - Ordered TSH, CMP and Aldosterone/Renin test to evaluate for secondary hypertension  - Patient has known history of mild sleep apnea however could not afford CPAP machine and is not interested in looking into insurance options for coverage or retesting with sleep study at this time- advised this may contribute to continued uncontrolled blood pressure  - Patient is scheduled to see Cardiology on 10/21/19  - Follow up in 2 months         Relevant Medications    cloNIDine (CATAPRES) 0 1 mg tablet    Other Relevant Orders    Comprehensive metabolic panel    Aldosterone/Renin Ratio    TSH, 3rd generation with Free T4 reflex       Other    Lower back pain     - Likely lumbar sprain/strain, no red flag signs/symptoms   - Will trial physical therapy, if worsening or persistent symptoms will consider lumbar XR   - Discussed Tylenol, heat, stretching and topical agents for pain relief          Relevant Orders    Ambulatory referral to Physical Therapy      Other Visit Diagnoses     Encounter for immunization        Relevant Orders    influenza vaccine, [id number], quadrivalent, 0 5 mL, preservative-free, for adult and pediatric patients 6 mos+ (AFLURIA, FLUARIX, FLULAVAL, FLUZONE)          Of note, allergy warning comes up for clonidine due to corn containing products, which is due to recorded allergy to Levaquin  Patient states adverse reaction to Levaquin was rash, and that she does not have corn allergy/eats corn all the time  Will continue with prescription, advised if any allergy symptoms to stop immediately   Also advised patient to discuss with pharmacy when she picks the medication up  Subjective:      Patient ID: Kassandra Padilla is a 46 y o  female with past medical history significant for GERD, non-insulin dependent type 2 DM, hypertension, and history of portion of thyroid removal (non-cancerous per patient) year ago presenting for follow-up of hypertension  HPI  The patient presents today for evaluation of hypertension  The patient has been compliant with her medications and notes no adverse effects from the medications at this time  The patient gets headaches approx  2-3 times per week when her BP is elevated and takes Tylenol with some relief  She denies any chest pains, shortness of breath or leg swelling  The patient's BP ranges are below:  - AM: 149-189/   9/23/19: 188/99   9/24/19: 180/99   9/30/19: 187/106   10/3/19: 176/108   10/6/19: 189/99  - PM: 148-185/   9/28/19: 184/96   10/6/19: 185/102    The patient also notes chronic lower back pain which seem to be worsening now, occurring for many months off and on  The pain is exacerbated with movements and excessive lifting, 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  She states she has increased back pain when getting out of bed as well  The patient denies any recent or history of back trauma, denies numbness/weakness in lower extremities or bowel/urinary incontinence  The following portions of the patient's history were reviewed and updated as appropriate: allergies, current medications, past medical history and problem list     Review of Systems   Constitutional: Negative for fever  HENT: Negative for congestion and sore throat  Respiratory: Negative for cough and shortness of breath  Cardiovascular: Negative for chest pain and leg swelling  Gastrointestinal: Negative for abdominal pain, constipation, diarrhea, nausea and vomiting  Genitourinary: Negative for difficulty urinating and dysuria  Musculoskeletal: Positive for back pain  Neurological: Positive for headaches (approx  2-3 times per week)  Negative for dizziness, weakness, light-headedness and numbness  Objective:      /90   Pulse 78   Temp (!) 96 7 °F (35 9 °C)   Resp 16   Ht 5' 2 5" (1 588 m)   Wt 95 3 kg (210 lb)   LMP 11/27/2017   BMI 37 80 kg/m²          Physical Exam   Constitutional: She is oriented to person, place, and time  She appears well-developed and well-nourished  No distress  HENT:   Head: Normocephalic and atraumatic  Eyes: Conjunctivae and EOM are normal  Right eye exhibits no discharge  Left eye exhibits no discharge  Neck: Neck supple  Cardiovascular: Normal rate, regular rhythm, normal heart sounds and intact distal pulses  No murmur heard  Pulmonary/Chest: Effort normal and breath sounds normal  No stridor  No respiratory distress  She has no wheezes  She has no rales  Musculoskeletal: She exhibits no edema, tenderness or deformity  Straight leg raise negative bilaterally   Neurological: She is alert and oriented to person, place, and time  She has normal strength  No sensory deficit  She exhibits normal muscle tone  Skin: Skin is warm  She is not diaphoretic  Psychiatric: She has a normal mood and affect  Vitals reviewed        Marietta Chinchilla, DO PGY-3   Leslie Rowland

## 2019-10-21 ENCOUNTER — OFFICE VISIT (OUTPATIENT)
Dept: CARDIOLOGY CLINIC | Facility: CLINIC | Age: 54
End: 2019-10-21
Payer: COMMERCIAL

## 2019-10-21 VITALS
BODY MASS INDEX: 37.42 KG/M2 | SYSTOLIC BLOOD PRESSURE: 163 MMHG | WEIGHT: 211.2 LBS | DIASTOLIC BLOOD PRESSURE: 98 MMHG | HEART RATE: 72 BPM | OXYGEN SATURATION: 97 % | HEIGHT: 63 IN

## 2019-10-21 DIAGNOSIS — I10 HTN (HYPERTENSION), BENIGN: Primary | ICD-10-CM

## 2019-10-21 DIAGNOSIS — E55.9 VITAMIN D INSUFFICIENCY: ICD-10-CM

## 2019-10-21 DIAGNOSIS — G47.33 OSA (OBSTRUCTIVE SLEEP APNEA): ICD-10-CM

## 2019-10-21 DIAGNOSIS — K21.9 GASTROESOPHAGEAL REFLUX DISEASE, ESOPHAGITIS PRESENCE NOT SPECIFIED: ICD-10-CM

## 2019-10-21 PROCEDURE — 99214 OFFICE O/P EST MOD 30 MIN: CPT | Performed by: INTERNAL MEDICINE

## 2019-10-21 RX ORDER — MELATONIN
1000 DAILY
Qty: 90 TABLET | Refills: 0 | Status: SHIPPED | OUTPATIENT
Start: 2019-10-21 | End: 2020-07-22

## 2019-10-21 RX ORDER — PANTOPRAZOLE SODIUM 20 MG/1
20 TABLET, DELAYED RELEASE ORAL DAILY
Qty: 90 TABLET | Refills: 0 | Status: SHIPPED | OUTPATIENT
Start: 2019-10-21 | End: 2020-04-03

## 2019-10-21 NOTE — PROGRESS NOTES
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    Right knee pain 11/20/2017    Radicular pain of left upper extremity 11/20/2017    Hypokalemia 10/07/2016    Mild intermittent asthma without complication 44/20/8587    KIM on CPAP 11/04/2015    Diverticulosis of colon 11/03/2015    Type 2 diabetes mellitus ([facility] ) 07/21/2015    Neck pain 07/07/2015    Urinary incontinence in female 04/16/2015    Fibroids 04/16/2015    Allergic rhinitis 03/27/2014    Microscopic hematuria 03/27/2014    Gastroesophageal reflux disease 02/11/2014    Lower back pain 08/05/2013    Vitamin D deficiency 04/04/2013    Hyperlipidemia 01/23/2013    Hypertension 01/23/2013       Assessment:  # HTN  Cardizem  mg daily  Lisinopril/ hctz 40/25 mg daily  Clonidine 0 1 mg BID  Echo 1/20/15:   LVEF: 65%, mild LVH  # DM2  # partial thyroidectomy, non cancerous  # GERD  # Obesity: BMI 38   # Mild KIM by sleep study in 2015  Has gained weight- she is open to wearing a CPAP but she states it would cost her $85 a month which she did not want to pay    Today's Plan:  Stress, weight gain, untreated KIM likely all contributing  Repeat sleep study  Exercise 4 times weekly- target  bpm  Will not increase current regimen as just started it  Swelling from amlodipine    HPI:       47 yo presents for evaluation of HTN  She is on combination therapy and remains high  She also has DM  Had an echo back in 2015 with normal EF  She is on triple therapy for BP with lisinopril/hctz, diltiazem and clonidine  BP still running high   She has gained weight which may be contributing  She is under more stress lately- her [employer] got arsened twice  Past Medical History:   Diagnosis Date    Asthmatic bronchitis with exacerbation     Last Assessed: 8/7/2014    Diabetes mellitus ([address] )     GERD (gastroesophageal reflux disease)     Hyperlipidemia     Hypertension     Impaired fasting glucose     Last Assessed: 1/15/2015       Review of Systems   Constitutional: Negative for activity change, appetite change, fatigue and unexpected weight change  HENT: Negative for congestion and nosebleeds  Eyes: Negative  Respiratory: Negative for cough, chest tightness and shortness of breath  Cardiovascular: Negative for chest pain, palpitations and leg swelling  Gastrointestinal: Negative for abdominal distention  Endocrine: Negative  Genitourinary: Negative  Musculoskeletal: Negative  Skin: Negative  Neurological: Negative for dizziness, syncope and weakness  Hematological: Negative  Psychiatric/Behavioral: Negative  Allergies   Allergen Reactions    Levaquin [Levofloxacin In D5w]            Current Outpatient Medications:     albuterol (VENTOLIN HFA) 90 mcg/act inhaler, Inhale 2 puffs every 4 (four) hours as needed for wheezing or shortness of breath, Disp: 1 Inhaler, Rfl: 0    atorvastatin (LIPITOR) 40 mg tablet, Take 1 tablet (40 mg total) by mouth daily at bedtime, Disp: 90 tablet, Rfl: 1    cholecalciferol (VITAMIN D3) 1,000 units tablet, Take 1 tablet (1,000 Units total) by mouth daily, Disp: 90 tablet, Rfl: 3    cloNIDine (CATAPRES) 0 1 mg tablet, Take 1 tablet (0 1 mg total) by mouth every 12 (twelve) hours, Disp: 60 tablet, Rfl: 2    cyclobenzaprine (FLEXERIL) 10 mg tablet, Take 1 tablet (10 mg total) by mouth 3 (three) times a day as needed for muscle spasms, Disp: 30 tablet, Rfl: 0    diltiazem (DILACOR XR) 120 MG 24 hr capsule, Take 1 capsule (120 mg total) by mouth daily, Disp: 90 capsule, Rfl: 1    lisinopril-hydrochlorothiazide (PRINZIDE,ZESTORETIC) 20-12 5 MG per tablet, Take 2 tablets by mouth daily, Disp: 180 tablet, Rfl: 0    metFORMIN (GLUCOPHAGE-XR) 500 mg 24 hr tablet, Take 1 tablet (500 mg total) by mouth 2 (two) times a day with meals, Disp: 180 tablet, Rfl: 1    naproxen (NAPROSYN) 500 mg tablet, Take 1 tablet (500 mg total) by mouth 2 (two) times a day with meals for 10 days, Disp: 20 tablet, Rfl: 0    pantoprazole (PROTONIX) 20 mg tablet, Take 1 tablet (20 mg total) by mouth daily, Disp: 90 tablet, Rfl: 0    Social History     Socioeconomic History    Marital status: /Civil Union     Spouse name: Not on file    Number of children: Not on file    Years of education: Not on file    Highest education level: Not on file   Occupational History    Not on file   Social Needs    Financial resource strain: Not on file    Food insecurity:     Worry: Not on file     Inability: Not on file    Transportation needs:     Medical: Not on file     Non-medical: Not on file   Tobacco Use    Smoking status: Never Smoker    Smokeless tobacco: Never Used   Substance and Sexual Activity    Alcohol use: No    Drug use: No    Sexual activity: Not on file   Lifestyle    Physical activity:     Days per week: Not on file     Minutes per session: Not on file    Stress: Not on file   Relationships    Social connections:     Talks on phone: Not on file     Gets together: Not on file     Attends Zoroastrian service: Not on file     Active member of club or organization: Not on file     Attends meetings of clubs or organizations: Not on file     Relationship status: Not on file    Intimate partner violence:     Fear of current or ex partner: Not on file     Emotionally abused: Not on file     Physically abused: Not on file     Forced sexual activity: Not on file   Other Topics Concern    Not on file   Social History Narrative    Uses safety equipment - seatbelts       Family History   Problem Relation Age of Onset    Coronary artery disease Mother     Diabetes Father     Stroke Father         Ischemic    Diabetes Brother        Physical Exam:    Vitals: Last menstrual period 11/27/2017 , There is no height or weight on file to calculate BMI ,   Wt Readings from Last 3 Encounters:   10/08/19 95 3 kg (210 lb)   09/16/19 95 3 kg (210 lb)   09/09/19 94 5 kg (208 lb 6 4 oz)       Physical Exam:  Vitals:    10/21/19 1017   BP: 163/98   Pulse:    SpO2:          Physical Exam   Constitutional: She is oriented to person, place, and time  She appears well-developed and well-nourished  HENT:   Head: Normocephalic and atraumatic  Eyes: Pupils are equal, round, and reactive to light  Neck: Normal range of motion  No JVD present  Cardiovascular: Normal rate and regular rhythm  No murmur heard  Pulmonary/Chest: Effort normal and breath sounds normal  No respiratory distress  Abdominal: Soft  She exhibits no distension  There is no tenderness  Musculoskeletal: Normal range of motion  She exhibits no edema  Neurological: She is alert and oriented to person, place, and time  Skin: Skin is warm and dry  No rash noted  Psychiatric: She has a normal mood and affect         Labs & Results:    Lab Results   Component Value Date    WBC 5 71 09/08/2019    HGB 12 2 09/08/2019    HCT 39 5 09/08/2019    MCV 84 09/08/2019     09/08/2019     Lab Results   Component Value Date    SODIUM 144 09/24/2019    K 3 7 09/24/2019     09/24/2019    CO2 28 09/24/2019    BUN 16 09/24/2019    CREATININE 0 74 09/24/2019    GLUC 145 (H) 09/08/2019    CALCIUM 9 3 09/24/2019     No results found for: BNP   Lab Results   Component Value Date    CHOLESTEROL 160 08/22/2019    CHOLESTEROL 140 10/13/2017    CHOLESTEROL 138 10/07/2016     Lab Results   Component Value Date    HDL 68 (H) 08/22/2019    HDL 61 (H) 10/13/2017    HDL 54 10/07/2016     Lab Results   Component Value Date    TRIG 97 08/22/2019    TRIG 133 10/13/2017    TRIG 130 10/07/2016     Lab Results   Component Value Date    Micha 92 08/22/2019             EKG personally reviewed by Randell Stewart DO  Counseling / Coordination of Care  Total floor / unit time spent today 40 minutes  Greater than 50% of total time was spent with the patient and / or family counseling and / or coordination of care  A description of the counseling / coordination of care: 25 min  Thank you for the opportunity to participate in the care of this patient  Randell ROBLES    Director of Advanced Heart Failure Program  Medical Director of [address] [place] [address]

## 2019-10-21 NOTE — PATIENT INSTRUCTIONS
I really want you to try to lose weight- at least 20 lbs    Exercise 4 times weekly- goal  bpm    Repeat sleep study

## 2019-10-22 RX ORDER — MELATONIN
1000 DAILY
Qty: 90 TABLET | Refills: 0 | OUTPATIENT
Start: 2019-10-22

## 2019-10-24 ENCOUNTER — TELEPHONE (OUTPATIENT)
Dept: SLEEP CENTER | Facility: CLINIC | Age: 54
End: 2019-10-24

## 2019-10-24 NOTE — TELEPHONE ENCOUNTER
----- Message from Brent Garcia MD sent at 10/23/2019  8:31 PM EDT -----  approved  ----- Message -----  From: Blane Mar  Sent: 10/22/2019   9:52 AM EDT  To: [facility], #    PLEASE REVIEW FOR APPROVAL OR DENIAL AND WHY

## 2019-11-14 DIAGNOSIS — E11.9 TYPE 2 DIABETES MELLITUS WITHOUT COMPLICATION, WITHOUT LONG-TERM CURRENT USE OF INSULIN (HCC): ICD-10-CM

## 2019-11-14 DIAGNOSIS — E78.5 HYPERLIPIDEMIA, UNSPECIFIED HYPERLIPIDEMIA TYPE: ICD-10-CM

## 2019-11-15 RX ORDER — ATORVASTATIN CALCIUM 40 MG/1
TABLET, FILM COATED ORAL
Qty: 90 TABLET | Refills: 1 | OUTPATIENT
Start: 2019-11-15

## 2019-11-15 RX ORDER — METFORMIN HYDROCHLORIDE 500 MG/1
500 TABLET, EXTENDED RELEASE ORAL 2 TIMES DAILY WITH MEALS
Qty: 180 TABLET | Refills: 0 | Status: SHIPPED | OUTPATIENT
Start: 2019-11-15 | End: 2020-02-24

## 2019-11-15 RX ORDER — ATORVASTATIN CALCIUM 40 MG/1
40 TABLET, FILM COATED ORAL
Qty: 90 TABLET | Refills: 0 | Status: SHIPPED | OUTPATIENT
Start: 2019-11-15 | End: 2020-02-28 | Stop reason: SDUPTHER

## 2019-11-15 RX ORDER — METFORMIN HYDROCHLORIDE 500 MG/1
TABLET, EXTENDED RELEASE ORAL
Qty: 180 TABLET | Refills: 1 | OUTPATIENT
Start: 2019-11-15

## 2019-12-11 ENCOUNTER — TELEPHONE (OUTPATIENT)
Dept: FAMILY MEDICINE CLINIC | Facility: CLINIC | Age: 54
End: 2019-12-11

## 2019-12-11 ENCOUNTER — OFFICE VISIT (OUTPATIENT)
Dept: FAMILY MEDICINE CLINIC | Facility: CLINIC | Age: 54
End: 2019-12-11

## 2019-12-11 VITALS
BODY MASS INDEX: 38.41 KG/M2 | RESPIRATION RATE: 18 BRPM | TEMPERATURE: 96 F | SYSTOLIC BLOOD PRESSURE: 130 MMHG | HEIGHT: 63 IN | DIASTOLIC BLOOD PRESSURE: 90 MMHG | HEART RATE: 78 BPM | WEIGHT: 216.8 LBS

## 2019-12-11 DIAGNOSIS — N95.0 POST-MENOPAUSAL BLEEDING: Primary | ICD-10-CM

## 2019-12-11 LAB — SL AMB POCT URINE HCG: NEGATIVE

## 2019-12-11 PROCEDURE — 58100 BIOPSY OF UTERUS LINING: CPT | Performed by: FAMILY MEDICINE

## 2019-12-11 PROCEDURE — 81025 URINE PREGNANCY TEST: CPT | Performed by: FAMILY MEDICINE

## 2019-12-11 PROCEDURE — 88305 TISSUE EXAM BY PATHOLOGIST: CPT | Performed by: PATHOLOGY

## 2019-12-11 PROCEDURE — 3008F BODY MASS INDEX DOCD: CPT | Performed by: FAMILY MEDICINE

## 2019-12-11 PROCEDURE — 1036F TOBACCO NON-USER: CPT | Performed by: FAMILY MEDICINE

## 2019-12-11 PROCEDURE — 99213 OFFICE O/P EST LOW 20 MIN: CPT | Performed by: FAMILY MEDICINE

## 2019-12-11 RX ORDER — IBUPROFEN 600 MG/1
600 TABLET ORAL ONCE
Status: COMPLETED | OUTPATIENT
Start: 2019-12-11 | End: 2019-12-11

## 2019-12-11 RX ADMIN — IBUPROFEN 600 MG: 600 TABLET ORAL at 17:25

## 2019-12-11 NOTE — PROGRESS NOTES
Assessment/Plan     Post-menopausal bleeding   Discussed various causes of postmenopausal bleeding  Discussed the importance of endometrial biopsy in postmenopausal bleeding  Patient verbalized understanding  Discussed the risk and complications of the procedure as well  Patient wants to get procedure done today  Will set up for endometrial biopsy   will also get ultrasound of the pelvic organs    Diagnoses and all orders for this visit:    Post-menopausal bleeding  -     ibuprofen (MOTRIN) tablet 600 mg  -     POCT urine HCG  -     Tissue Exam; Future  -     US pelvis complete non OB; Future  -     Tissue Exam    Other orders  -     Biopsy       Subjective     Chief Complaint   Patient presents with    postmenopausal bleeding       54 yo  with h/o fibroids s/p embolization done in  presented with complaints of vaginal bleeding  Patient reports that her last period was 15 months ago  That period happened after a gap of 9 months, that is when she was keeping a count this time  She reports that she had to use 3 pads since morning today, now the bleeding seems to have decreased  She does feel a little bit of discomfort in her abdomen  She has past medical history significant for diabetes mellitus and hypertension  She denies any recent injuries  Her last Pap smear was 5 years ago which was normal       The following portions of the patient's history were reviewed and updated as appropriate: allergies, current medications, past family history, past medical history, past social history, past surgical history and problem list     Review of Systems   Constitutional: Negative for activity change, chills and fever  HENT: Negative for congestion  Respiratory: Negative for shortness of breath  Cardiovascular: Negative for chest pain  Gastrointestinal: Negative for diarrhea, nausea and vomiting  Genitourinary: Positive for vaginal bleeding  Negative for difficulty urinating     Skin: Negative for rash    Neurological: Negative for dizziness and headaches  Psychiatric/Behavioral: Negative for agitation  Objective     Vitals:Blood pressure 130/90, pulse 78, temperature (!) 96 °F (35 6 °C), resp  rate 18, height 5' 2 5" (1 588 m), weight 98 3 kg (216 lb 12 8 oz), last menstrual period 11/27/2017  Physical Exam:  Physical Exam   Constitutional: She is oriented to person, place, and time  She appears well-developed and well-nourished  HENT:   Head: Normocephalic and atraumatic  Eyes: Conjunctivae and EOM are normal    Neck: Normal range of motion  Neck supple  Cardiovascular: Normal rate, regular rhythm and normal heart sounds  Exam reveals no friction rub  No murmur heard  Pulmonary/Chest: Effort normal and breath sounds normal  No respiratory distress  She has no wheezes  She has no rales  Abdominal: Soft  Bowel sounds are normal  She exhibits no distension  There is no tenderness  Genitourinary:   Genitourinary Comments: On per speculum examination, cervix multi parous  Bleeding from os   Musculoskeletal: Normal range of motion  Neurological: She is alert and oriented to person, place, and time  Skin: Skin is warm and dry  Vitals reviewed    Biopsy  Date/Time: 12/11/2019 5:19 PM  Performed by: Sivan Barton MD  Authorized by: Sivan Barton MD     Procedure Details - Skin Biopsy:     Biopsy tissue type:  endometrial        Date/Time: 12/11/2019 5:15 PM  Performed by: Sivan Barton MD  Authorized by: Sivan Barton MD     Consent:     Consent obtained:  Written    Consent given by:  Patient    Procedural risks discussed:  Bleeding, failure rate, infection and repeat procedure    Patient questions answered: yes      Patient agrees, verbalizes understanding, and wants to proceed: yes      Instructions and paperwork completed: yes    Pre-procedure:     Premeds:  Ibuprofen  Procedure:     Under satisfactory analgesia the patient was prepped and draped in the dorsal lithotomy position: yes      [place] speculum was placed in the vagina: yes      Specimen to pathology: yes            patient tolerated procedure well

## 2019-12-11 NOTE — TELEPHONE ENCOUNTER
Spoke with patient, she is almost 12 months without menses, started bleeding again, large amounts   Scheduled today with Dr Marty Toro

## 2019-12-11 NOTE — ASSESSMENT & PLAN NOTE
Discussed various causes of postmenopausal bleeding  Discussed the importance of endometrial biopsy in postmenopausal bleeding  Patient verbalized understanding  Discussed the risk and complications of the procedure as well  Patient wants to get procedure done today    Will set up for endometrial biopsy   will also get ultrasound of the pelvic organs

## 2019-12-17 ENCOUNTER — HOSPITAL ENCOUNTER (OUTPATIENT)
Dept: ULTRASOUND IMAGING | Facility: HOSPITAL | Age: 54
Discharge: HOME | End: 2019-12-17
Payer: COMMERCIAL

## 2019-12-17 DIAGNOSIS — N95.0 POST-MENOPAUSAL BLEEDING: ICD-10-CM

## 2019-12-17 PROCEDURE — 76856 US EXAM PELVIC COMPLETE: CPT

## 2019-12-17 PROCEDURE — 76830 TRANSVAGINAL US NON-OB: CPT

## 2019-12-24 ENCOUNTER — TELEPHONE (OUTPATIENT)
Dept: FAMILY MEDICINE CLINIC | Facility: CLINIC | Age: 54
End: 2019-12-24

## 2019-12-24 ENCOUNTER — OFFICE VISIT (OUTPATIENT)
Dept: FAMILY MEDICINE CLINIC | Facility: CLINIC | Age: 54
End: 2019-12-24

## 2019-12-24 VITALS
DIASTOLIC BLOOD PRESSURE: 80 MMHG | HEART RATE: 80 BPM | RESPIRATION RATE: 18 BRPM | TEMPERATURE: 98.3 F | WEIGHT: 213.8 LBS | BODY MASS INDEX: 37.88 KG/M2 | HEIGHT: 63 IN | SYSTOLIC BLOOD PRESSURE: 130 MMHG

## 2019-12-24 DIAGNOSIS — N95.0 POST-MENOPAUSAL BLEEDING: Primary | ICD-10-CM

## 2019-12-24 PROCEDURE — 99213 OFFICE O/P EST LOW 20 MIN: CPT | Performed by: FAMILY MEDICINE

## 2019-12-24 PROCEDURE — 3008F BODY MASS INDEX DOCD: CPT | Performed by: FAMILY MEDICINE

## 2019-12-24 NOTE — PROGRESS NOTES
Assessment/Plan     Post-menopausal bleeding  Discuss results of endometrial biopsy done on 12/11/2019  Biopsy showed Secretory type endometrium  No hyperplasia or carcinoma identified  Results of ultrasound pelvic organs still pending  Discussed with the patient that depending on results of ultrasound, she might need further procedures like D&C  And depending on those results will give referral to gynecology  In the meantime patient still has spotting, but reports that it is improving  Diagnoses and all orders for this visit:    Post-menopausal bleeding  -     Ambulatory referral to Gynecology; Future         Subjective     Chief Complaint   Patient presents with    Results       65-year-old female presented to office for a follow-up visit of endometrial biopsy  Patient was seen on 11th of December, when she started to have vaginal bleeding after a gap of 15 months  Patient also has history of embolization for uterine fibroids in 2014  She is here to discuss results of endometrial biopsy  She also got ultrasound of pelvic organs done  She reports that she still has bleeding, but it has decreased now is only in the form of spotting  She has no other concerns today  Female  Problem   The patient's primary symptoms include vaginal bleeding  This is a new problem  The current episode started 1 to 4 weeks ago  The problem occurs 2 to 4 times per day  The problem has been gradually improving  The pain is mild  She is not pregnant  Associated symptoms include headaches  Pertinent negatives include no abdominal pain, diarrhea or nausea  The vaginal bleeding is spotting  She has not been passing clots  She has not been passing tissue  Nothing aggravates the symptoms  She is sexually active  No, her partner does not have an STD  She uses tubal ligation for contraception   She is postmenopausal        The following portions of the patient's history were reviewed and updated as appropriate: allergies, current medications, past family history, past medical history, past social history, past surgical history and problem list     Review of Systems   Constitutional: Negative for activity change and appetite change  HENT: Negative for congestion  Respiratory: Negative for cough and wheezing  Cardiovascular: Negative for chest pain and palpitations  Gastrointestinal: Negative for abdominal pain, diarrhea and nausea  Genitourinary: Positive for vaginal bleeding  Negative for difficulty urinating  Neurological: Positive for headaches  Objective     Vitals:Blood pressure 130/80, pulse 80, temperature 98 3 °F (36 8 °C), resp  rate 18, height 5' 2 5" (1 588 m), weight 97 kg (213 lb 12 8 oz), last menstrual period 11/27/2017  Physical Exam:  Physical Exam   Constitutional: She is oriented to person, place, and time  She appears well-developed and well-nourished  HENT:   Head: Normocephalic and atraumatic  Eyes: Conjunctivae and EOM are normal    Neck: Normal range of motion  Neck supple  Cardiovascular: Normal rate, regular rhythm and normal heart sounds  Exam reveals no friction rub  No murmur heard  Pulmonary/Chest: Effort normal and breath sounds normal  No respiratory distress  She has no wheezes  She has no rales  Abdominal: Soft  Bowel sounds are normal  She exhibits no distension  There is no tenderness  Musculoskeletal: Normal range of motion  Neurological: She is alert and oriented to person, place, and time  Skin: Skin is warm and dry  Vitals reviewed        Answers for HPI/ROS submitted by the patient on 12/17/2019   Female genitourinary complaint  vaginal bleeding: Yes  Chronicity: new  Onset: 1 to 4 weeks ago  Frequency: 2 to 4 times per day  Progression since onset: gradually improving  Severity of pain: mild  Pregnant now?: No  headaches: Yes  Aggravated by: nothing  Sexual activity: sexually active  Partner with STD symptoms: no  Birth control: tubal ligation  Menstrual history: postmenopausal  Vaginal bleeding: spotting  Passing clots?: No  Passing tissue?: No

## 2019-12-24 NOTE — TELEPHONE ENCOUNTER
[facility] called, there are significant findings in pelvic ultrasound  Result in chart, please review

## 2019-12-24 NOTE — ASSESSMENT & PLAN NOTE
Discuss results of endometrial biopsy done on 12/11/2019  Biopsy showed Secretory type endometrium  No hyperplasia or carcinoma identified  Results of ultrasound pelvic organs still pending  Discussed with the patient that depending on results of ultrasound, she might need further procedures like D&C  And depending on those results will give referral to gynecology  In the meantime patient still has spotting, but reports that it is improving

## 2019-12-31 ENCOUNTER — OFFICE VISIT (OUTPATIENT)
Dept: OBGYN CLINIC | Facility: CLINIC | Age: 54
End: 2019-12-31
Payer: COMMERCIAL

## 2019-12-31 VITALS — BODY MASS INDEX: 38.7 KG/M2 | DIASTOLIC BLOOD PRESSURE: 74 MMHG | WEIGHT: 215 LBS | SYSTOLIC BLOOD PRESSURE: 130 MMHG

## 2019-12-31 DIAGNOSIS — N95.0 POSTMENOPAUSAL BLEEDING: ICD-10-CM

## 2019-12-31 DIAGNOSIS — Z01.419 ENCNTR FOR GYN EXAM (GENERAL) (ROUTINE) W/O ABN FINDINGS: Primary | ICD-10-CM

## 2019-12-31 DIAGNOSIS — Z12.31 VISIT FOR SCREENING MAMMOGRAM: ICD-10-CM

## 2019-12-31 DIAGNOSIS — N95.0 POST-MENOPAUSAL BLEEDING: ICD-10-CM

## 2019-12-31 PROBLEM — Z12.39 SCREENING BREAST EXAMINATION: Status: RESOLVED | Noted: 2019-05-05 | Resolved: 2019-12-31

## 2019-12-31 PROBLEM — J06.9 VIRAL UPPER RESPIRATORY TRACT INFECTION: Status: RESOLVED | Noted: 2018-07-14 | Resolved: 2019-12-31

## 2019-12-31 PROCEDURE — S0610 ANNUAL GYNECOLOGICAL EXAMINA: HCPCS | Performed by: PHYSICIAN ASSISTANT

## 2019-12-31 PROCEDURE — G0145 SCR C/V CYTO,THINLAYER,RESCR: HCPCS | Performed by: PHYSICIAN ASSISTANT

## 2019-12-31 PROCEDURE — 87624 HPV HI-RISK TYP POOLED RSLT: CPT | Performed by: PHYSICIAN ASSISTANT

## 2019-12-31 RX ORDER — DILTIAZEM HYDROCHLORIDE 120 MG/1
CAPSULE, EXTENDED RELEASE ORAL
Refills: 0 | COMMUNITY
Start: 2019-12-05 | End: 2020-02-25 | Stop reason: ALTCHOICE

## 2019-12-31 NOTE — PROGRESS NOTES
Isaiah Gaona   1965    CC:  Yearly exam    S:  53 y o  female here for yearly exam  She is postmenopausal  Since September 2017  She has a history of a fibroid embolization in 2015  This past month she had a 10 day long vaginal bleed that felt exactly like a period  This has since resolved  She saw [facility] and had a pelvic ultrasound showing multiple fibroids, the largest being 6 7 x 6 9 x 6 2cm and a 10mm endometrium    She had an endometrial biopsy showing secretory endometrium and no hyperplasia or carcinoma  Sexual activity: She is sexually active with her  without pain, bleeding or dryness  She remains busy with work and helping at the [employer] [facility] [address]  Last Pap: 2015 neg (no HPV run)  Last Mammo:   Last Colonoscopy:   Last DEXA: never    We reviewed ASC guidelines for Pap testing       Family hx of breast cancer: no  Family hx of ovarian cancer:  no  Family hx of colon cancer: no    Current Outpatient Medications:     albuterol (VENTOLIN HFA) 90 mcg/act inhaler, Inhale 2 puffs every 4 (four) hours as needed for wheezing or shortness of breath, Disp: 1 Inhaler, Rfl: 0    atorvastatin (LIPITOR) 40 mg tablet, Take 1 tablet (40 mg total) by mouth daily at bedtime, Disp: 90 tablet, Rfl: 0    CARTIA  MG 24 hr capsule, , Disp: , Rfl: 0    cholecalciferol (VITAMIN D3) 1,000 units tablet, Take 1 tablet (1,000 Units total) by mouth daily, Disp: 90 tablet, Rfl: 0    cloNIDine (CATAPRES) 0 1 mg tablet, Take 1 tablet (0 1 mg total) by mouth every 12 (twelve) hours, Disp: 60 tablet, Rfl: 2    cyclobenzaprine (FLEXERIL) 10 mg tablet, Take 1 tablet (10 mg total) by mouth 3 (three) times a day as needed for muscle spasms, Disp: 30 tablet, Rfl: 0    diltiazem (DILACOR XR) 120 MG 24 hr capsule, Take 1 capsule (120 mg total) by mouth daily, Disp: 90 capsule, Rfl: 1    lisinopril-hydrochlorothiazide (PRINZIDE,ZESTORETIC) 20-12 5 MG per tablet, Take 2 tablets by mouth daily (Patient taking differently: Take 1 tablet by mouth daily ), Disp: 180 tablet, Rfl: 0    metFORMIN (GLUCOPHAGE-XR) 500 mg 24 hr tablet, Take 1 tablet (500 mg total) by mouth 2 (two) times a day with meals, Disp: 180 tablet, Rfl: 0    pantoprazole (PROTONIX) 20 mg tablet, Take 1 tablet (20 mg total) by mouth daily, Disp: 90 tablet, Rfl: 0    naproxen (NAPROSYN) 500 mg tablet, Take 1 tablet (500 mg total) by mouth 2 (two) times a day with meals for 10 days, Disp: 20 tablet, Rfl: 0  Social History     Socioeconomic History    Marital status: /Civil Union     Spouse name: Not on file    Number of children: Not on file    Years of education: Not on file    Highest education level: Not on file   Occupational History    Not on file   Social Needs    Financial resource strain: Not on file    Food insecurity:     Worry: Not on file     Inability: Not on file    Transportation needs:     Medical: Not on file     Non-medical: Not on file   Tobacco Use    Smoking status: Never Smoker    Smokeless tobacco: Never Used   Substance and Sexual Activity    Alcohol use: No    Drug use: No    Sexual activity: Yes     Partners: Male     Birth control/protection: Female Sterilization   Lifestyle    Physical activity:     Days per week: Not on file     Minutes per session: Not on file    Stress: Not on file   Relationships    Social connections:     Talks on phone: Not on file     Gets together: Not on file     Attends Restorationism service: Not on file     Active member of club or organization: Not on file     Attends meetings of clubs or organizations: Not on file     Relationship status: Not on file    Intimate partner violence:     Fear of current or ex partner: Not on file     Emotionally abused: Not on file     Physically abused: Not on file     Forced sexual activity: Not on file   Other Topics Concern    Not on file   Social History Narrative    Uses safety equipment - seatbelts Family History   Problem Relation Age of Onset    Coronary artery disease Mother     Diabetes Father     Stroke Father         Ischemic    Diabetes Brother      Past Medical History:   Diagnosis Date    Asthmatic bronchitis with exacerbation     Last Assessed: 8/7/2014    Diabetes mellitus ([facility]     GERD (gastroesophageal reflux disease)     Hyperlipidemia     Hypertension     Impaired fasting glucose     Last Assessed: 1/15/2015        Review of Systems   Respiratory: Negative  Cardiovascular: Negative  Gastrointestinal: Negative for constipation and diarrhea  Genitourinary: Negative for difficulty urinating, pelvic pain, vaginal bleeding, vaginal discharge, itching or odor  O:  Blood pressure 130/74, weight 97 5 kg (215 lb), last menstrual period 11/27/2017  Patient appears well and is not in distress  Neck is supple without masses  Breasts are symmetrical without mass, tenderness, nipple discharge, skin changes or adenopathy  Abdomen is soft and nontender without masses  External genitals are normal without lesions or rashes  Urethral meatus and urethra are normal  Bladder with moderate cystocele  Vagina is normal without discharge or bleeding  Cervix is normal without discharge or lesion  Prolapsed to 2cm from the introitus   Uterus is normal, mobile, nontender without palpable mass  Adnexa are normal, nontender, without palpable mass  A/P:   Yearly exam  Pap and HPV today  Mammo slip given  Colonoscopy recommended   Postmenopausal bleed  This is most likely c/w a menstrual period based on pathology  She will call with any further bleeding - would need D&C  Uterine prolapse, cystocele  She reports significant issues with pressure and difficulty completely emptying her bladder  Referred to urogyn for hyst/cystocele repair    RTO one year for yearly exam or sooner as needed

## 2020-01-02 LAB
HPV HR 12 DNA CVX QL NAA+PROBE: NEGATIVE
HPV16 DNA CVX QL NAA+PROBE: NEGATIVE
HPV18 DNA CVX QL NAA+PROBE: NEGATIVE

## 2020-01-03 LAB
LAB AP GYN PRIMARY INTERPRETATION: NORMAL
Lab: NORMAL

## 2020-01-06 ENCOUNTER — TELEPHONE (OUTPATIENT)
Dept: OBGYN CLINIC | Facility: CLINIC | Age: 55
End: 2020-01-06

## 2020-01-08 ENCOUNTER — HOSPITAL ENCOUNTER (OUTPATIENT)
Dept: RADIOLOGY | Age: 55
Discharge: HOME | End: 2020-01-08
Payer: COMMERCIAL

## 2020-01-08 VITALS — WEIGHT: 215 LBS | HEIGHT: 63 IN | BODY MASS INDEX: 38.09 KG/M2

## 2020-01-08 DIAGNOSIS — Z12.31 VISIT FOR SCREENING MAMMOGRAM: ICD-10-CM

## 2020-01-08 PROCEDURE — 77063 BREAST TOMOSYNTHESIS BI: CPT

## 2020-01-08 PROCEDURE — 77067 SCR MAMMO BI INCL CAD: CPT

## 2020-02-07 ENCOUNTER — TELEPHONE (OUTPATIENT)
Dept: SLEEP CENTER | Facility: CLINIC | Age: 55
End: 2020-02-07

## 2020-02-09 ENCOUNTER — HOSPITAL ENCOUNTER (OUTPATIENT)
Dept: SLEEP CENTER | Facility: CLINIC | Age: 55
Discharge: HOME | End: 2020-02-09
Payer: COMMERCIAL

## 2020-02-09 DIAGNOSIS — I10 HTN (HYPERTENSION), BENIGN: ICD-10-CM

## 2020-02-09 DIAGNOSIS — G47.33 OSA (OBSTRUCTIVE SLEEP APNEA): ICD-10-CM

## 2020-02-09 PROCEDURE — 95810 POLYSOM 6/> YRS 4/> PARAM: CPT

## 2020-02-10 NOTE — PROGRESS NOTES
Sleep Study Documentation    Pre-Sleep Study       Sleep testing procedure explained to patient:YES    Patient napped prior to study:NO    Caffeine:Dayshift worker after 12PM   Caffeine use:NO    Alcohol:Dayshift workers after 5PM: Alcohol use:NO    Typical day for patient:YES       Study Documentation    Sleep Study Indications:    Snoring   BMI >30   Excessive daytime sleepiness   Hypertension    Sleep Study: Diagnostic       Snore:None  Supplemental O2: no    Minimum SaO2:  85%  Baseline SaO2:   95%    EKG abnormalities: no     EEG abnormalities: no    Sleep Study Recorded < 2 hours: N/A    Sleep Study Recorded > 2 hours but incomplete study: N/A    Sleep Study Recorded 6 hours but no sleep obtained: NO    Patient classification: unemployed       Post-Sleep Study    Medication used at bedtime or during sleep study:YES other prescription medications    Patient reports time it took to fall asleep:30 to 60 minutes    Patient reports waking up during study:1 to 2 times  Patient reports returning to sleep in greater than 30 minutes  Patient reports sleeping 2 to 4 hours without dreaming  Patient reports sleep during study:typical    Patient rated sleepiness: Very sleepy or tired    PAP treatment:no

## 2020-02-12 ENCOUNTER — TELEPHONE (OUTPATIENT)
Dept: SLEEP CENTER | Facility: CLINIC | Age: 55
End: 2020-02-12

## 2020-02-12 DIAGNOSIS — G47.33 OSA (OBSTRUCTIVE SLEEP APNEA): Primary | ICD-10-CM

## 2020-02-12 NOTE — TELEPHONE ENCOUNTER
Discussed study results- scheduled consult, DME set-up & compliance follow-up  Appointment information emailed to [employer]

## 2020-02-18 DIAGNOSIS — E11.9 TYPE 2 DIABETES MELLITUS WITHOUT COMPLICATION, WITHOUT LONG-TERM CURRENT USE OF INSULIN (HCC): ICD-10-CM

## 2020-02-24 RX ORDER — METFORMIN HYDROCHLORIDE 500 MG/1
TABLET, EXTENDED RELEASE ORAL
Qty: 180 TABLET | Refills: 0 | Status: SHIPPED | OUTPATIENT
Start: 2020-02-24 | End: 2020-05-07

## 2020-02-25 DIAGNOSIS — I10 HYPERTENSION, UNSPECIFIED TYPE: Primary | ICD-10-CM

## 2020-02-25 DIAGNOSIS — I10 ESSENTIAL HYPERTENSION: ICD-10-CM

## 2020-02-25 RX ORDER — DILTIAZEM HYDROCHLORIDE 120 MG/1
120 CAPSULE, EXTENDED RELEASE ORAL DAILY
Qty: 90 CAPSULE | Refills: 1 | Status: SHIPPED | OUTPATIENT
Start: 2020-02-25 | End: 2020-08-25 | Stop reason: SDUPTHER

## 2020-02-25 RX ORDER — DILTIAZEM HYDROCHLORIDE 120 MG/1
120 CAPSULE, COATED, EXTENDED RELEASE ORAL DAILY
Qty: 90 CAPSULE | Refills: 1 | OUTPATIENT
Start: 2020-02-25

## 2020-02-28 DIAGNOSIS — E78.5 HYPERLIPIDEMIA, UNSPECIFIED HYPERLIPIDEMIA TYPE: ICD-10-CM

## 2020-02-28 RX ORDER — ATORVASTATIN CALCIUM 40 MG/1
40 TABLET, FILM COATED ORAL
Qty: 90 TABLET | Refills: 0 | Status: SHIPPED | OUTPATIENT
Start: 2020-02-28 | End: 2020-06-09

## 2020-04-03 ENCOUNTER — TELEMEDICINE (OUTPATIENT)
Dept: FAMILY MEDICINE CLINIC | Facility: CLINIC | Age: 55
End: 2020-04-03

## 2020-04-03 VITALS — TEMPERATURE: 95.7 F | BODY MASS INDEX: 38.52 KG/M2 | WEIGHT: 214 LBS

## 2020-04-03 DIAGNOSIS — R10.13 DYSPEPSIA: Primary | ICD-10-CM

## 2020-04-03 PROCEDURE — G2012 BRIEF CHECK IN BY MD/QHP: HCPCS | Performed by: FAMILY MEDICINE

## 2020-04-03 RX ORDER — DARIFENACIN HYDROBROMIDE 7.5 MG/1
7.5 TABLET, EXTENDED RELEASE ORAL DAILY
COMMUNITY
Start: 2020-02-20 | End: 2021-04-27 | Stop reason: ALTCHOICE

## 2020-04-03 RX ORDER — DILTIAZEM HYDROCHLORIDE 120 MG/1
CAPSULE, EXTENDED RELEASE ORAL
COMMUNITY
Start: 2020-02-25 | End: 2020-06-08

## 2020-04-03 RX ORDER — PANTOPRAZOLE SODIUM 40 MG/1
40 TABLET, DELAYED RELEASE ORAL DAILY
Qty: 30 TABLET | Refills: 0 | Status: SHIPPED | OUTPATIENT
Start: 2020-04-03 | End: 2020-05-07

## 2020-05-01 ENCOUNTER — OFFICE VISIT (OUTPATIENT)
Dept: CARDIOLOGY CLINIC | Facility: CLINIC | Age: 55
End: 2020-05-01
Payer: COMMERCIAL

## 2020-05-01 VITALS
WEIGHT: 215 LBS | HEART RATE: 83 BPM | SYSTOLIC BLOOD PRESSURE: 154 MMHG | DIASTOLIC BLOOD PRESSURE: 106 MMHG | BODY MASS INDEX: 38.7 KG/M2

## 2020-05-01 DIAGNOSIS — G47.33 OSA (OBSTRUCTIVE SLEEP APNEA): ICD-10-CM

## 2020-05-01 DIAGNOSIS — I10 ESSENTIAL HYPERTENSION: ICD-10-CM

## 2020-05-01 DIAGNOSIS — E78.2 MIXED HYPERLIPIDEMIA: Primary | ICD-10-CM

## 2020-05-01 DIAGNOSIS — I10 HTN (HYPERTENSION), BENIGN: ICD-10-CM

## 2020-05-01 PROCEDURE — G2012 BRIEF CHECK IN BY MD/QHP: HCPCS | Performed by: INTERNAL MEDICINE

## 2020-05-01 RX ORDER — CLONIDINE HYDROCHLORIDE 0.1 MG/1
0.1 TABLET ORAL EVERY 12 HOURS SCHEDULED
Qty: 180 TABLET | Refills: 2 | Status: SHIPPED | OUTPATIENT
Start: 2020-05-01 | End: 2021-05-09

## 2020-05-05 DIAGNOSIS — E11.9 TYPE 2 DIABETES MELLITUS WITHOUT COMPLICATION, WITHOUT LONG-TERM CURRENT USE OF INSULIN (HCC): ICD-10-CM

## 2020-05-07 ENCOUNTER — PATIENT MESSAGE (OUTPATIENT)
Dept: FAMILY MEDICINE CLINIC | Facility: CLINIC | Age: 55
End: 2020-05-07

## 2020-05-07 DIAGNOSIS — J45.21 MILD INTERMITTENT ASTHMA WITH EXACERBATION: ICD-10-CM

## 2020-05-07 DIAGNOSIS — R10.13 DYSPEPSIA: ICD-10-CM

## 2020-05-07 DIAGNOSIS — E11.9 TYPE 2 DIABETES MELLITUS WITHOUT COMPLICATION, WITHOUT LONG-TERM CURRENT USE OF INSULIN (HCC): ICD-10-CM

## 2020-05-07 RX ORDER — PANTOPRAZOLE SODIUM 40 MG/1
TABLET, DELAYED RELEASE ORAL
Qty: 30 TABLET | Refills: 0 | Status: SHIPPED | OUTPATIENT
Start: 2020-05-07 | End: 2020-05-11 | Stop reason: SDUPTHER

## 2020-05-07 RX ORDER — METFORMIN HYDROCHLORIDE 500 MG/1
TABLET, EXTENDED RELEASE ORAL
Qty: 180 TABLET | Refills: 0 | Status: SHIPPED | OUTPATIENT
Start: 2020-05-07 | End: 2020-05-28 | Stop reason: SDUPTHER

## 2020-05-08 RX ORDER — METFORMIN HYDROCHLORIDE 500 MG/1
500 TABLET, EXTENDED RELEASE ORAL 2 TIMES DAILY WITH MEALS
Qty: 180 TABLET | Refills: 0 | OUTPATIENT
Start: 2020-05-08

## 2020-05-11 RX ORDER — PANTOPRAZOLE SODIUM 40 MG/1
40 TABLET, DELAYED RELEASE ORAL DAILY
Qty: 30 TABLET | Refills: 3 | Status: SHIPPED | OUTPATIENT
Start: 2020-05-11 | End: 2020-10-01 | Stop reason: SDUPTHER

## 2020-05-11 RX ORDER — ALBUTEROL SULFATE 90 UG/1
2 AEROSOL, METERED RESPIRATORY (INHALATION) EVERY 4 HOURS PRN
Qty: 1 INHALER | Refills: 3 | Status: SHIPPED | OUTPATIENT
Start: 2020-05-11 | End: 2021-10-25 | Stop reason: SDUPTHER

## 2020-05-21 DIAGNOSIS — I10 ESSENTIAL HYPERTENSION: ICD-10-CM

## 2020-05-21 RX ORDER — LISINOPRIL AND HYDROCHLOROTHIAZIDE 25; 20 MG/1; MG/1
TABLET ORAL
Qty: 90 TABLET | Refills: 1 | Status: SHIPPED | OUTPATIENT
Start: 2020-05-21 | End: 2020-11-25 | Stop reason: SDUPTHER

## 2020-05-27 ENCOUNTER — TELEPHONE (OUTPATIENT)
Dept: FAMILY MEDICINE CLINIC | Facility: CLINIC | Age: 55
End: 2020-05-27

## 2020-05-28 ENCOUNTER — OFFICE VISIT (OUTPATIENT)
Dept: FAMILY MEDICINE CLINIC | Facility: CLINIC | Age: 55
End: 2020-05-28

## 2020-05-28 VITALS
RESPIRATION RATE: 16 BRPM | SYSTOLIC BLOOD PRESSURE: 140 MMHG | WEIGHT: 216.6 LBS | HEIGHT: 63 IN | HEART RATE: 82 BPM | TEMPERATURE: 97.4 F | DIASTOLIC BLOOD PRESSURE: 80 MMHG | BODY MASS INDEX: 38.38 KG/M2

## 2020-05-28 DIAGNOSIS — Z01.818 PREOP GENERAL PHYSICAL EXAM: ICD-10-CM

## 2020-05-28 DIAGNOSIS — E11.9 TYPE 2 DIABETES MELLITUS WITHOUT COMPLICATION, WITHOUT LONG-TERM CURRENT USE OF INSULIN (HCC): ICD-10-CM

## 2020-05-28 DIAGNOSIS — N81.10 BLADDER PROLAPSE, FEMALE, ACQUIRED: Primary | ICD-10-CM

## 2020-05-28 LAB — SL AMB POCT HEMOGLOBIN AIC: 7.8 (ref ?–6.5)

## 2020-05-28 PROCEDURE — 83036 HEMOGLOBIN GLYCOSYLATED A1C: CPT | Performed by: FAMILY MEDICINE

## 2020-05-28 PROCEDURE — 3079F DIAST BP 80-89 MM HG: CPT | Performed by: FAMILY MEDICINE

## 2020-05-28 PROCEDURE — 3051F HG A1C>EQUAL 7.0%<8.0%: CPT | Performed by: FAMILY MEDICINE

## 2020-05-28 PROCEDURE — 3008F BODY MASS INDEX DOCD: CPT | Performed by: FAMILY MEDICINE

## 2020-05-28 PROCEDURE — 3077F SYST BP >= 140 MM HG: CPT | Performed by: FAMILY MEDICINE

## 2020-05-28 PROCEDURE — 93000 ELECTROCARDIOGRAM COMPLETE: CPT | Performed by: FAMILY MEDICINE

## 2020-05-28 PROCEDURE — 99213 OFFICE O/P EST LOW 20 MIN: CPT | Performed by: FAMILY MEDICINE

## 2020-05-28 RX ORDER — METFORMIN HYDROCHLORIDE 500 MG/1
1000 TABLET, EXTENDED RELEASE ORAL 2 TIMES DAILY WITH MEALS
Qty: 360 TABLET | Refills: 0 | Status: SHIPPED | OUTPATIENT
Start: 2020-05-28 | End: 2020-10-09

## 2020-06-01 ENCOUNTER — APPOINTMENT (OUTPATIENT)
Dept: LAB | Facility: HOSPITAL | Age: 55
End: 2020-06-01
Payer: COMMERCIAL

## 2020-06-01 DIAGNOSIS — N81.10 BLADDER PROLAPSE, FEMALE, ACQUIRED: ICD-10-CM

## 2020-06-01 LAB
ANION GAP SERPL CALCULATED.3IONS-SCNC: 8 MMOL/L (ref 4–13)
BASOPHILS # BLD AUTO: 0.03 THOUSANDS/ΜL (ref 0–0.1)
BASOPHILS NFR BLD AUTO: 1 % (ref 0–1)
BUN SERPL-MCNC: 16 MG/DL (ref 5–25)
CALCIUM SERPL-MCNC: 9.6 MG/DL (ref 8.3–10.1)
CHLORIDE SERPL-SCNC: 105 MMOL/L (ref 100–108)
CO2 SERPL-SCNC: 25 MMOL/L (ref 21–32)
CREAT SERPL-MCNC: 0.6 MG/DL (ref 0.6–1.3)
EOSINOPHIL # BLD AUTO: 0.11 THOUSAND/ΜL (ref 0–0.61)
EOSINOPHIL NFR BLD AUTO: 2 % (ref 0–6)
ERYTHROCYTE [DISTWIDTH] IN BLOOD BY AUTOMATED COUNT: 16.7 % (ref 11.6–15.1)
GFR SERPL CREATININE-BSD FRML MDRD: 103 ML/MIN/1.73SQ M
GLUCOSE P FAST SERPL-MCNC: 129 MG/DL (ref 65–99)
HCT VFR BLD AUTO: 38.8 % (ref 34.8–46.1)
HGB BLD-MCNC: 12 G/DL (ref 11.5–15.4)
IMM GRANULOCYTES # BLD AUTO: 0.02 THOUSAND/UL (ref 0–0.2)
IMM GRANULOCYTES NFR BLD AUTO: 0 % (ref 0–2)
LYMPHOCYTES # BLD AUTO: 1.26 THOUSANDS/ΜL (ref 0.6–4.47)
LYMPHOCYTES NFR BLD AUTO: 22 % (ref 14–44)
MCH RBC QN AUTO: 25.9 PG (ref 26.8–34.3)
MCHC RBC AUTO-ENTMCNC: 30.9 G/DL (ref 31.4–37.4)
MCV RBC AUTO: 84 FL (ref 82–98)
MONOCYTES # BLD AUTO: 0.42 THOUSAND/ΜL (ref 0.17–1.22)
MONOCYTES NFR BLD AUTO: 7 % (ref 4–12)
NEUTROPHILS # BLD AUTO: 3.99 THOUSANDS/ΜL (ref 1.85–7.62)
NEUTS SEG NFR BLD AUTO: 68 % (ref 43–75)
NRBC BLD AUTO-RTO: 0 /100 WBCS
PLATELET # BLD AUTO: 216 THOUSANDS/UL (ref 149–390)
PMV BLD AUTO: 11.4 FL (ref 8.9–12.7)
POTASSIUM SERPL-SCNC: 3.9 MMOL/L (ref 3.5–5.3)
RBC # BLD AUTO: 4.63 MILLION/UL (ref 3.81–5.12)
SODIUM SERPL-SCNC: 138 MMOL/L (ref 136–145)
WBC # BLD AUTO: 5.83 THOUSAND/UL (ref 4.31–10.16)

## 2020-06-01 PROCEDURE — 85025 COMPLETE CBC W/AUTO DIFF WBC: CPT

## 2020-06-01 PROCEDURE — 80048 BASIC METABOLIC PNL TOTAL CA: CPT

## 2020-06-01 PROCEDURE — 36415 COLL VENOUS BLD VENIPUNCTURE: CPT

## 2020-06-09 DIAGNOSIS — E78.5 HYPERLIPIDEMIA, UNSPECIFIED HYPERLIPIDEMIA TYPE: ICD-10-CM

## 2020-06-09 RX ORDER — ATORVASTATIN CALCIUM 40 MG/1
TABLET, FILM COATED ORAL
Qty: 90 TABLET | Refills: 0 | Status: SHIPPED | OUTPATIENT
Start: 2020-06-09 | End: 2020-09-18 | Stop reason: SDUPTHER

## 2020-06-10 DIAGNOSIS — Z11.59 SCREENING FOR VIRAL DISEASE: ICD-10-CM

## 2020-06-10 PROCEDURE — U0003 INFECTIOUS AGENT DETECTION BY NUCLEIC ACID (DNA OR RNA); SEVERE ACUTE RESPIRATORY SYNDROME CORONAVIRUS 2 (SARS-COV-2) (CORONAVIRUS DISEASE [COVID-19]), AMPLIFIED PROBE TECHNIQUE, MAKING USE OF HIGH THROUGHPUT TECHNOLOGIES AS DESCRIBED BY CMS-2020-01-R: HCPCS

## 2020-06-11 LAB — SARS-COV-2 RNA SPEC QL NAA+PROBE: NOT DETECTED

## 2020-06-12 ENCOUNTER — ANESTHESIA EVENT (OUTPATIENT)
Dept: PERIOP | Facility: HOSPITAL | Age: 55
End: 2020-06-12
Payer: COMMERCIAL

## 2020-06-15 ENCOUNTER — ANESTHESIA (OUTPATIENT)
Dept: PERIOP | Facility: HOSPITAL | Age: 55
End: 2020-06-15
Payer: COMMERCIAL

## 2020-06-15 ENCOUNTER — HOSPITAL ENCOUNTER (OUTPATIENT)
Facility: HOSPITAL | Age: 55
Setting detail: OUTPATIENT SURGERY
Discharge: HOME | End: 2020-06-15
Attending: OBSTETRICS & GYNECOLOGY | Admitting: OBSTETRICS & GYNECOLOGY
Payer: COMMERCIAL

## 2020-06-15 VITALS
RESPIRATION RATE: 18 BRPM | SYSTOLIC BLOOD PRESSURE: 142 MMHG | HEART RATE: 76 BPM | TEMPERATURE: 96 F | HEIGHT: 63 IN | BODY MASS INDEX: 38.27 KG/M2 | DIASTOLIC BLOOD PRESSURE: 68 MMHG | WEIGHT: 216 LBS | OXYGEN SATURATION: 94 %

## 2020-06-15 DIAGNOSIS — N39.3 STRESS INCONTINENCE (FEMALE) (MALE): ICD-10-CM

## 2020-06-15 DIAGNOSIS — N81.11 CYSTOCELE, MIDLINE: ICD-10-CM

## 2020-06-15 DIAGNOSIS — N36.41 HYPERMOBILITY OF URETHRA: ICD-10-CM

## 2020-06-15 DIAGNOSIS — N81.6 RECTOCELE: ICD-10-CM

## 2020-06-15 DIAGNOSIS — Z11.59 SCREENING FOR VIRAL DISEASE: Primary | ICD-10-CM

## 2020-06-15 LAB
GLUCOSE SERPL-MCNC: 173 MG/DL (ref 65–140)
GLUCOSE SERPL-MCNC: 189 MG/DL (ref 65–140)

## 2020-06-15 PROCEDURE — 88304 TISSUE EXAM BY PATHOLOGIST: CPT | Performed by: PATHOLOGY

## 2020-06-15 PROCEDURE — 82948 REAGENT STRIP/BLOOD GLUCOSE: CPT

## 2020-06-15 PROCEDURE — 51798 US URINE CAPACITY MEASURE: CPT | Performed by: OBSTETRICS & GYNECOLOGY

## 2020-06-15 DEVICE — THE NEUGUIDE™ IS A SINGLE USE TRANS-VAGINAL DEVICE FOR THE REPAIR OF PELVIC ORGAN PROLAPSE (POP) BY ANCHORING SUTURES TO LIGAMENTS OF THE PELVIC FLOOR.
Type: IMPLANTABLE DEVICE | Site: VAGINA | Status: FUNCTIONAL
Brand: NEUGUIDE™

## 2020-06-15 RX ORDER — DOCUSATE SODIUM 100 MG/1
100 CAPSULE, LIQUID FILLED ORAL 2 TIMES DAILY
Qty: 10 CAPSULE | Refills: 0
Start: 2020-06-15 | End: 2021-04-27 | Stop reason: ALTCHOICE

## 2020-06-15 RX ORDER — SENNOSIDES 8.6 MG
650 CAPSULE ORAL EVERY 8 HOURS PRN
Qty: 30 TABLET | Refills: 0
Start: 2020-06-15

## 2020-06-15 RX ORDER — KETOROLAC TROMETHAMINE 30 MG/ML
INJECTION, SOLUTION INTRAMUSCULAR; INTRAVENOUS AS NEEDED
Status: DISCONTINUED | OUTPATIENT
Start: 2020-06-15 | End: 2020-06-15 | Stop reason: SURG

## 2020-06-15 RX ORDER — SODIUM CHLORIDE 9 MG/ML
125 INJECTION, SOLUTION INTRAVENOUS CONTINUOUS
Status: DISCONTINUED | OUTPATIENT
Start: 2020-06-15 | End: 2020-06-15 | Stop reason: HOSPADM

## 2020-06-15 RX ORDER — EPHEDRINE SULFATE 50 MG/ML
INJECTION INTRAVENOUS AS NEEDED
Status: DISCONTINUED | OUTPATIENT
Start: 2020-06-15 | End: 2020-06-15 | Stop reason: SURG

## 2020-06-15 RX ORDER — CEFAZOLIN SODIUM 2 G/50ML
SOLUTION INTRAVENOUS AS NEEDED
Status: DISCONTINUED | OUTPATIENT
Start: 2020-06-15 | End: 2020-06-15 | Stop reason: SURG

## 2020-06-15 RX ORDER — MAGNESIUM HYDROXIDE 1200 MG/15ML
LIQUID ORAL AS NEEDED
Status: DISCONTINUED | OUTPATIENT
Start: 2020-06-15 | End: 2020-06-15 | Stop reason: HOSPADM

## 2020-06-15 RX ORDER — MIDAZOLAM HYDROCHLORIDE 2 MG/2ML
INJECTION, SOLUTION INTRAMUSCULAR; INTRAVENOUS AS NEEDED
Status: DISCONTINUED | OUTPATIENT
Start: 2020-06-15 | End: 2020-06-15 | Stop reason: SURG

## 2020-06-15 RX ORDER — FENTANYL CITRATE/PF 50 MCG/ML
25 SYRINGE (ML) INJECTION
Status: DISCONTINUED | OUTPATIENT
Start: 2020-06-15 | End: 2020-06-15 | Stop reason: HOSPADM

## 2020-06-15 RX ORDER — CLINDAMYCIN PHOSPHATE 900 MG/50ML
900 INJECTION INTRAVENOUS ONCE
Status: DISCONTINUED | OUTPATIENT
Start: 2020-06-15 | End: 2020-06-15

## 2020-06-15 RX ORDER — PROPOFOL 10 MG/ML
INJECTION, EMULSION INTRAVENOUS AS NEEDED
Status: DISCONTINUED | OUTPATIENT
Start: 2020-06-15 | End: 2020-06-15 | Stop reason: SURG

## 2020-06-15 RX ORDER — DEXAMETHASONE SODIUM PHOSPHATE 4 MG/ML
INJECTION, SOLUTION INTRA-ARTICULAR; INTRALESIONAL; INTRAMUSCULAR; INTRAVENOUS; SOFT TISSUE AS NEEDED
Status: DISCONTINUED | OUTPATIENT
Start: 2020-06-15 | End: 2020-06-15 | Stop reason: SURG

## 2020-06-15 RX ORDER — IBUPROFEN 600 MG/1
600 TABLET ORAL EVERY 6 HOURS PRN
Qty: 30 TABLET | Refills: 0
Start: 2020-06-15

## 2020-06-15 RX ORDER — ONDANSETRON 2 MG/ML
4 INJECTION INTRAMUSCULAR; INTRAVENOUS EVERY 6 HOURS PRN
Status: DISCONTINUED | OUTPATIENT
Start: 2020-06-15 | End: 2020-06-15 | Stop reason: HOSPADM

## 2020-06-15 RX ORDER — ONDANSETRON 2 MG/ML
INJECTION INTRAMUSCULAR; INTRAVENOUS AS NEEDED
Status: DISCONTINUED | OUTPATIENT
Start: 2020-06-15 | End: 2020-06-15 | Stop reason: SURG

## 2020-06-15 RX ORDER — ONDANSETRON 2 MG/ML
4 INJECTION INTRAMUSCULAR; INTRAVENOUS ONCE AS NEEDED
Status: DISCONTINUED | OUTPATIENT
Start: 2020-06-15 | End: 2020-06-15 | Stop reason: HOSPADM

## 2020-06-15 RX ORDER — FENTANYL CITRATE 50 UG/ML
INJECTION, SOLUTION INTRAMUSCULAR; INTRAVENOUS AS NEEDED
Status: DISCONTINUED | OUTPATIENT
Start: 2020-06-15 | End: 2020-06-15 | Stop reason: SURG

## 2020-06-15 RX ORDER — ACETAMINOPHEN 325 MG/1
650 TABLET ORAL EVERY 6 HOURS PRN
Status: DISCONTINUED | OUTPATIENT
Start: 2020-06-15 | End: 2020-06-15 | Stop reason: HOSPADM

## 2020-06-15 RX ORDER — IBUPROFEN 600 MG/1
600 TABLET ORAL EVERY 6 HOURS PRN
Status: DISCONTINUED | OUTPATIENT
Start: 2020-06-15 | End: 2020-06-15 | Stop reason: HOSPADM

## 2020-06-15 RX ADMIN — SODIUM CHLORIDE 125 ML/HR: 0.9 INJECTION, SOLUTION INTRAVENOUS at 09:06

## 2020-06-15 RX ADMIN — FENTANYL CITRATE 25 MCG: 50 INJECTION, SOLUTION INTRAMUSCULAR; INTRAVENOUS at 11:21

## 2020-06-15 RX ADMIN — EPHEDRINE SULFATE 5 MG: 50 INJECTION, SOLUTION INTRAVENOUS at 11:25

## 2020-06-15 RX ADMIN — KETOROLAC TROMETHAMINE 30 MG: 30 INJECTION, SOLUTION INTRAMUSCULAR at 11:32

## 2020-06-15 RX ADMIN — PROPOFOL 200 MG: 10 INJECTION, EMULSION INTRAVENOUS at 09:44

## 2020-06-15 RX ADMIN — EPHEDRINE SULFATE 5 MG: 50 INJECTION, SOLUTION INTRAVENOUS at 10:32

## 2020-06-15 RX ADMIN — DEXAMETHASONE SODIUM PHOSPHATE 4 MG: 4 INJECTION, SOLUTION INTRAMUSCULAR; INTRAVENOUS at 09:50

## 2020-06-15 RX ADMIN — MIDAZOLAM 2 MG: 1 INJECTION INTRAMUSCULAR; INTRAVENOUS at 09:35

## 2020-06-15 RX ADMIN — CEFAZOLIN SODIUM 2000 MG: 2 SOLUTION INTRAVENOUS at 09:34

## 2020-06-15 RX ADMIN — SODIUM CHLORIDE: 0.9 INJECTION, SOLUTION INTRAVENOUS at 10:39

## 2020-06-15 RX ADMIN — FENTANYL CITRATE 50 MCG: 50 INJECTION, SOLUTION INTRAMUSCULAR; INTRAVENOUS at 10:44

## 2020-06-15 RX ADMIN — ONDANSETRON 4 MG: 2 INJECTION INTRAMUSCULAR; INTRAVENOUS at 10:54

## 2020-06-15 RX ADMIN — FENTANYL CITRATE 50 MCG: 50 INJECTION, SOLUTION INTRAMUSCULAR; INTRAVENOUS at 09:48

## 2020-06-15 RX ADMIN — FENTANYL CITRATE 50 MCG: 50 INJECTION, SOLUTION INTRAMUSCULAR; INTRAVENOUS at 10:01

## 2020-07-10 ENCOUNTER — TELEPHONE (OUTPATIENT)
Dept: FAMILY MEDICINE CLINIC | Facility: CLINIC | Age: 55
End: 2020-07-10

## 2020-07-22 DIAGNOSIS — E55.9 VITAMIN D INSUFFICIENCY: ICD-10-CM

## 2020-07-22 RX ORDER — AVOBENZONE, HOMOSALATE, OCTISALATE, OCTOCRYLENE 30; 100; 50; 25 MG/ML; MG/ML; MG/ML; MG/ML
SPRAY TOPICAL
Qty: 90 TABLET | Refills: 0 | Status: SHIPPED | OUTPATIENT
Start: 2020-07-22

## 2020-08-24 ENCOUNTER — TELEPHONE (OUTPATIENT)
Dept: FAMILY MEDICINE CLINIC | Facility: CLINIC | Age: 55
End: 2020-08-24

## 2020-08-25 DIAGNOSIS — I10 ESSENTIAL HYPERTENSION: ICD-10-CM

## 2020-08-26 RX ORDER — DILTIAZEM HYDROCHLORIDE 120 MG/1
120 CAPSULE, EXTENDED RELEASE ORAL DAILY
Qty: 90 CAPSULE | Refills: 1 | Status: SHIPPED | OUTPATIENT
Start: 2020-08-26 | End: 2021-02-25

## 2020-09-18 DIAGNOSIS — E78.5 HYPERLIPIDEMIA, UNSPECIFIED HYPERLIPIDEMIA TYPE: ICD-10-CM

## 2020-09-18 RX ORDER — ATORVASTATIN CALCIUM 40 MG/1
40 TABLET, FILM COATED ORAL
Qty: 90 TABLET | Refills: 1 | Status: SHIPPED | OUTPATIENT
Start: 2020-09-18 | End: 2021-06-15

## 2020-09-29 DIAGNOSIS — R10.13 DYSPEPSIA: ICD-10-CM

## 2020-09-29 RX ORDER — PANTOPRAZOLE SODIUM 40 MG/1
TABLET, DELAYED RELEASE ORAL
Qty: 30 TABLET | Refills: 3 | OUTPATIENT
Start: 2020-09-29

## 2020-10-01 DIAGNOSIS — R10.13 DYSPEPSIA: ICD-10-CM

## 2020-10-01 RX ORDER — PANTOPRAZOLE SODIUM 40 MG/1
40 TABLET, DELAYED RELEASE ORAL DAILY
Qty: 30 TABLET | Refills: 0 | Status: SHIPPED | OUTPATIENT
Start: 2020-10-01 | End: 2020-11-06

## 2020-10-08 ENCOUNTER — TELEMEDICINE (OUTPATIENT)
Dept: FAMILY MEDICINE CLINIC | Facility: CLINIC | Age: 55
End: 2020-10-08

## 2020-10-08 VITALS
BODY MASS INDEX: 36.32 KG/M2 | HEIGHT: 63 IN | WEIGHT: 205 LBS | DIASTOLIC BLOOD PRESSURE: 81 MMHG | TEMPERATURE: 97.6 F | SYSTOLIC BLOOD PRESSURE: 154 MMHG | HEART RATE: 77 BPM

## 2020-10-08 DIAGNOSIS — E78.2 MIXED HYPERLIPIDEMIA: ICD-10-CM

## 2020-10-08 DIAGNOSIS — J45.20 MILD INTERMITTENT ASTHMA WITHOUT COMPLICATION: ICD-10-CM

## 2020-10-08 DIAGNOSIS — E11.9 TYPE 2 DIABETES MELLITUS WITHOUT COMPLICATION, WITHOUT LONG-TERM CURRENT USE OF INSULIN (HCC): Primary | ICD-10-CM

## 2020-10-08 DIAGNOSIS — I10 ESSENTIAL HYPERTENSION: ICD-10-CM

## 2020-10-08 DIAGNOSIS — Z13.5 SCREENING FOR DIABETIC RETINOPATHY: ICD-10-CM

## 2020-10-08 DIAGNOSIS — E11.9 TYPE 2 DIABETES MELLITUS WITHOUT COMPLICATION, WITHOUT LONG-TERM CURRENT USE OF INSULIN (HCC): ICD-10-CM

## 2020-10-08 DIAGNOSIS — Z11.4 SCREENING FOR HIV (HUMAN IMMUNODEFICIENCY VIRUS): ICD-10-CM

## 2020-10-08 PROCEDURE — 1036F TOBACCO NON-USER: CPT | Performed by: FAMILY MEDICINE

## 2020-10-08 PROCEDURE — 99213 OFFICE O/P EST LOW 20 MIN: CPT | Performed by: FAMILY MEDICINE

## 2020-10-08 PROCEDURE — 3008F BODY MASS INDEX DOCD: CPT | Performed by: FAMILY MEDICINE

## 2020-10-08 PROCEDURE — 3725F SCREEN DEPRESSION PERFORMED: CPT | Performed by: FAMILY MEDICINE

## 2020-10-09 RX ORDER — METFORMIN HYDROCHLORIDE 500 MG/1
TABLET, EXTENDED RELEASE ORAL
Qty: 360 TABLET | Refills: 0 | Status: SHIPPED | OUTPATIENT
Start: 2020-10-09 | End: 2021-01-18

## 2020-10-15 ENCOUNTER — TRANSCRIBE ORDERS (OUTPATIENT)
Dept: LAB | Facility: CLINIC | Age: 55
End: 2020-10-15

## 2020-10-15 ENCOUNTER — APPOINTMENT (OUTPATIENT)
Dept: LAB | Facility: CLINIC | Age: 55
End: 2020-10-15
Payer: COMMERCIAL

## 2020-10-15 DIAGNOSIS — E78.2 MIXED HYPERLIPIDEMIA: ICD-10-CM

## 2020-10-15 DIAGNOSIS — Z11.4 SCREENING FOR HIV (HUMAN IMMUNODEFICIENCY VIRUS): ICD-10-CM

## 2020-10-15 DIAGNOSIS — E11.9 TYPE 2 DIABETES MELLITUS WITHOUT COMPLICATION, WITHOUT LONG-TERM CURRENT USE OF INSULIN (HCC): ICD-10-CM

## 2020-10-15 LAB
CHOLEST SERPL-MCNC: 139 MG/DL (ref 50–200)
CREAT UR-MCNC: 189 MG/DL
EST. AVERAGE GLUCOSE BLD GHB EST-MCNC: 163 MG/DL
HBA1C MFR BLD: 7.3 %
HDLC SERPL-MCNC: 59 MG/DL
LDLC SERPL CALC-MCNC: 54 MG/DL (ref 0–100)
MICROALBUMIN UR-MCNC: 20.4 MG/L (ref 0–20)
MICROALBUMIN/CREAT 24H UR: 11 MG/G CREATININE (ref 0–30)
NONHDLC SERPL-MCNC: 80 MG/DL
TRIGL SERPL-MCNC: 132 MG/DL

## 2020-10-15 PROCEDURE — 87389 HIV-1 AG W/HIV-1&-2 AB AG IA: CPT

## 2020-10-15 PROCEDURE — 3051F HG A1C>EQUAL 7.0%<8.0%: CPT | Performed by: FAMILY MEDICINE

## 2020-10-15 PROCEDURE — 36415 COLL VENOUS BLD VENIPUNCTURE: CPT

## 2020-10-15 PROCEDURE — 82570 ASSAY OF URINE CREATININE: CPT

## 2020-10-15 PROCEDURE — 3061F NEG MICROALBUMINURIA REV: CPT | Performed by: FAMILY MEDICINE

## 2020-10-15 PROCEDURE — 82043 UR ALBUMIN QUANTITATIVE: CPT

## 2020-10-15 PROCEDURE — 80061 LIPID PANEL: CPT

## 2020-10-15 PROCEDURE — 83036 HEMOGLOBIN GLYCOSYLATED A1C: CPT

## 2020-10-16 LAB — HIV 1+2 AB+HIV1 P24 AG SERPL QL IA: NORMAL

## 2020-10-23 ENCOUNTER — TELEPHONE (OUTPATIENT)
Dept: FAMILY MEDICINE CLINIC | Facility: CLINIC | Age: 55
End: 2020-10-23

## 2020-10-23 ENCOUNTER — TELEMEDICINE (OUTPATIENT)
Dept: FAMILY MEDICINE CLINIC | Facility: CLINIC | Age: 55
End: 2020-10-23

## 2020-10-23 DIAGNOSIS — Z20.822 EXPOSURE TO COVID-19 VIRUS: Primary | ICD-10-CM

## 2020-10-23 PROCEDURE — 99213 OFFICE O/P EST LOW 20 MIN: CPT | Performed by: FAMILY MEDICINE

## 2020-10-26 ENCOUNTER — TELEMEDICINE (OUTPATIENT)
Dept: FAMILY MEDICINE CLINIC | Facility: CLINIC | Age: 55
End: 2020-10-26

## 2020-10-26 DIAGNOSIS — Z20.822 EXPOSURE TO COVID-19 VIRUS: ICD-10-CM

## 2020-10-26 DIAGNOSIS — Z20.822 EXPOSURE TO COVID-19 VIRUS: Primary | ICD-10-CM

## 2020-10-26 PROCEDURE — 99214 OFFICE O/P EST MOD 30 MIN: CPT | Performed by: FAMILY MEDICINE

## 2020-10-26 PROCEDURE — U0003 INFECTIOUS AGENT DETECTION BY NUCLEIC ACID (DNA OR RNA); SEVERE ACUTE RESPIRATORY SYNDROME CORONAVIRUS 2 (SARS-COV-2) (CORONAVIRUS DISEASE [COVID-19]), AMPLIFIED PROBE TECHNIQUE, MAKING USE OF HIGH THROUGHPUT TECHNOLOGIES AS DESCRIBED BY CMS-2020-01-R: HCPCS | Performed by: FAMILY MEDICINE

## 2020-10-27 ENCOUNTER — TELEMEDICINE (OUTPATIENT)
Dept: FAMILY MEDICINE CLINIC | Facility: CLINIC | Age: 55
End: 2020-10-27

## 2020-10-27 VITALS — TEMPERATURE: 98.4 F

## 2020-10-27 DIAGNOSIS — U07.1 COVID-19: Primary | ICD-10-CM

## 2020-10-27 LAB — SARS-COV-2 RNA SPEC QL NAA+PROBE: DETECTED

## 2020-10-27 PROCEDURE — 99213 OFFICE O/P EST LOW 20 MIN: CPT | Performed by: FAMILY MEDICINE

## 2020-10-30 DIAGNOSIS — R10.13 DYSPEPSIA: ICD-10-CM

## 2020-11-04 ENCOUNTER — TELEMEDICINE (OUTPATIENT)
Dept: FAMILY MEDICINE CLINIC | Facility: CLINIC | Age: 55
End: 2020-11-04

## 2020-11-04 VITALS — BODY MASS INDEX: 36.32 KG/M2 | WEIGHT: 205 LBS | HEIGHT: 63 IN | TEMPERATURE: 97.4 F

## 2020-11-04 DIAGNOSIS — U07.1 COVID-19: Primary | ICD-10-CM

## 2020-11-04 PROCEDURE — 1036F TOBACCO NON-USER: CPT | Performed by: FAMILY MEDICINE

## 2020-11-04 PROCEDURE — 3008F BODY MASS INDEX DOCD: CPT | Performed by: FAMILY MEDICINE

## 2020-11-04 PROCEDURE — 99213 OFFICE O/P EST LOW 20 MIN: CPT | Performed by: FAMILY MEDICINE

## 2020-11-06 RX ORDER — PANTOPRAZOLE SODIUM 40 MG/1
TABLET, DELAYED RELEASE ORAL
Qty: 30 TABLET | Refills: 0 | Status: SHIPPED | OUTPATIENT
Start: 2020-11-06 | End: 2020-12-02

## 2020-11-25 DIAGNOSIS — I10 ESSENTIAL HYPERTENSION: ICD-10-CM

## 2020-11-30 RX ORDER — LISINOPRIL AND HYDROCHLOROTHIAZIDE 25; 20 MG/1; MG/1
1 TABLET ORAL DAILY
Qty: 90 TABLET | Refills: 0 | Status: SHIPPED | OUTPATIENT
Start: 2020-11-30 | End: 2021-02-25

## 2020-12-01 DIAGNOSIS — R10.13 DYSPEPSIA: ICD-10-CM

## 2020-12-02 RX ORDER — PANTOPRAZOLE SODIUM 40 MG/1
TABLET, DELAYED RELEASE ORAL
Qty: 30 TABLET | Refills: 0 | Status: SHIPPED | OUTPATIENT
Start: 2020-12-02 | End: 2021-01-05

## 2021-01-05 DIAGNOSIS — R10.13 DYSPEPSIA: ICD-10-CM

## 2021-01-05 RX ORDER — PANTOPRAZOLE SODIUM 40 MG/1
TABLET, DELAYED RELEASE ORAL
Qty: 30 TABLET | Refills: 0 | Status: SHIPPED | OUTPATIENT
Start: 2021-01-05 | End: 2021-02-04

## 2021-01-15 DIAGNOSIS — E11.9 TYPE 2 DIABETES MELLITUS WITHOUT COMPLICATION, WITHOUT LONG-TERM CURRENT USE OF INSULIN (HCC): ICD-10-CM

## 2021-01-18 RX ORDER — METFORMIN HYDROCHLORIDE 500 MG/1
TABLET, EXTENDED RELEASE ORAL
Qty: 360 TABLET | Refills: 0 | Status: SHIPPED | OUTPATIENT
Start: 2021-01-18 | End: 2021-04-12

## 2021-02-03 DIAGNOSIS — R10.13 DYSPEPSIA: ICD-10-CM

## 2021-02-04 RX ORDER — PANTOPRAZOLE SODIUM 40 MG/1
TABLET, DELAYED RELEASE ORAL
Qty: 30 TABLET | Refills: 0 | Status: SHIPPED | OUTPATIENT
Start: 2021-02-04 | End: 2021-03-02

## 2021-02-05 ENCOUNTER — TELEMEDICINE (OUTPATIENT)
Dept: FAMILY MEDICINE CLINIC | Facility: CLINIC | Age: 56
End: 2021-02-05

## 2021-02-05 DIAGNOSIS — M76.31 ILIOTIBIAL BAND SYNDROME OF RIGHT SIDE: Primary | ICD-10-CM

## 2021-02-05 PROCEDURE — 99213 OFFICE O/P EST LOW 20 MIN: CPT | Performed by: FAMILY MEDICINE

## 2021-02-05 NOTE — PROGRESS NOTES
Virtual Brief Visit    Assessment/Plan:    Problem List Items Addressed This Visit        Musculoskeletal and Integument    Iliotibial band syndrome of right side - Primary     - OTC analgesics  - ice   - RTC precautions                     Reason for visit is   Chief Complaint   Patient presents with    Virtual Brief Visit        Encounter provider Marcelino Choudhury MD    Provider located at [address] [address] [phone]    Recent Visits  Date Type Provider Dept   02/05/21 Iveth De La Paz MD  [facility]   Showing recent visits within past 7 days and meeting all other requirements     Future Appointments  No visits were found meeting these conditions  Showing future appointments within next 150 days and meeting all other requirements        After connecting through telephone, the patient was identified by name and date of birth  Anthony Paul was informed that this is a telemedicine visit and that the visit is being conducted through telephone  My office door was closed  No one else was in the room  She acknowledged consent and understanding of privacy and security of the platform  The patient has agreed to participate and understands she can discontinue the visit at any time  Patient is aware this is a billable service  It was my intent to perform this visit via video technology but the patient was not able to do a video connection so the visit was completed via audio telephone only  Subjective    Anthony Paul is a 63 y o  female   Hip Pain   The incident occurred 3 to 5 days ago  Incident location: no incident  There was no injury mechanism  The pain is present in the right hip  The quality of the pain is described as cramping  The pain has been intermittent since onset   Pertinent negatives include no inability to bear weight, loss of motion, loss of sensation, muscle weakness, numbness or tingling  She reports no foreign bodies present  Nothing aggravates the symptoms  She has tried acetaminophen for the symptoms  The treatment provided significant relief  Past Medical History:   Diagnosis Date    Asthmatic bronchitis with exacerbation     Last Assessed: 8/7/2014    Diabetes mellitus ([facility] [address] )     GERD (gastroesophageal reflux disease)     Hyperlipidemia     Hypertension     Impaired fasting glucose     Last Assessed: 1/15/2015    Pneumonia        Past Surgical History:   Procedure Laterality Date    COLONOSCOPY      HAND SURGERY      OTHER SURGICAL HISTORY      circloge    NM ANTERIOR COLPORRAPHY RPR CYSTOCELE W/CYSTO N/A 6/15/2020    Procedure: ANTERIOR AND POSTERIOR COLPORRHAPHY;  Surgeon: Osmar Walsh MD;  [facility];  Service: UroGynecology           NM CYSTOURETHROSCOPY N/A 6/15/2020    Procedure: Edrie Hilt;  Surgeon: Osmar Walsh MD;  [facility];  Service: UroGynecology           NM Thena Enrrique LIG N/A 6/15/2020    Procedure: COLPOSUSPENSION VAGINAL EXTRAPERITONEAL(ENPLACE);   Surgeon: Osmar Walsh MD;  [facility];  Service: UroGynecology           NM SLING OPER STRES INCONTINENCE N/A 6/15/2020    Procedure: PUBOVAGINAL SLING;  Surgeon: Osmar Walsh MD;  [facility];  Service: UroGynecology           THYROID SURGERY      THYROIDECTOMY, PARTIAL      TUBAL LIGATION      TUMOR REMOVAL      right wrist    UTERINE FIBROID EMBOLIZATION      Last Assessed: 7/25/2014       Current Outpatient Medications   Medication Sig Dispense Refill    acetaminophen (TYLENOL) 650 mg CR tablet Take 1 tablet (650 mg total) by mouth every 8 (eight) hours as needed for mild pain 30 tablet 0    albuterol (Ventolin HFA) 90 mcg/act inhaler Inhale 2 puffs every 4 (four) hours as needed for wheezing or shortness of breath 1 Inhaler 3    Ascorbic Acid (VITAMIN C PO) Take 1 tablet by mouth daily      atorvastatin (LIPITOR) 40 mg tablet Take 1 tablet (40 mg total) by mouth daily at bedtime 90 tablet 1    cloNIDine (CATAPRES) 0 1 mg tablet Take 1 tablet (0 1 mg total) by mouth every 12 (twelve) hours 180 tablet 2    darifenacin (ENABLEX) 7 5 MG 24 hr tablet Take 7 5 mg by mouth daily       diltiazem (DILACOR XR) 120 MG 24 hr capsule Take 1 capsule (120 mg total) by mouth daily (Patient not taking: Reported on 11/4/2020) 90 capsule 1    docusate sodium (COLACE) 100 mg capsule Take 1 capsule (100 mg total) by mouth 2 (two) times a day (Patient not taking: Reported on 11/4/2020) 10 capsule 0    ibuprofen (MOTRIN) 600 mg tablet Take 1 tablet (600 mg total) by mouth every 6 (six) hours as needed for mild pain (Patient taking differently: Take 600 mg by mouth every 6 (six) hours as needed for mild pain PRN) 30 tablet 0    lisinopril-hydrochlorothiazide (PRINZIDE,ZESTORETIC) 20-25 MG per tablet Take 1 tablet by mouth daily 90 tablet 0    metFORMIN (GLUCOPHAGE-XR) 500 mg 24 hr tablet take 2 tablets by mouth twice a day with meals 360 tablet 0    pantoprazole (PROTONIX) 40 mg tablet take 1 tablet by mouth once daily 30 tablet 0    RA VITAMIN D-3 25 MCG (1000 UT) tablet take 1 tablet by mouth once daily 90 tablet 0     No current facility-administered medications for this visit  Allergies   Allergen Reactions    Levaquin [Levofloxacin In D5w] Rash       Review of Systems   Constitutional: Negative for chills and fever  Musculoskeletal: Negative for gait problem, joint swelling and myalgias  Neurological: Negative for tingling and numbness  There were no vitals filed for this visit  I spent 15 minutes directly with the patient during this visit    VIRTUAL VISIT DISCLAIMER    Kassandra Padilla acknowledges that she has consented to an online visit or consultation   She understands that the online visit is based solely on information provided by her, and that, in the absence of a face-to-face physical evaluation by the physician, the diagnosis she receives is both limited and provisional in terms of accuracy and completeness  This is not intended to replace a full medical face-to-face evaluation by the physician  Michael Wyatt understands and accepts these terms

## 2021-02-06 PROBLEM — M76.31 ILIOTIBIAL BAND SYNDROME OF RIGHT SIDE: Status: ACTIVE | Noted: 2021-02-06

## 2021-02-19 NOTE — ASSESSMENT & PLAN NOTE
- Uncontrolled, /110 with headache similar to prior headaches, normal neurologic exam and no red flag signs/symptoms   - Continue lisinopril/HCTZ 20-25 mg daily  - Discontinue carvedilol   - Start diltiazem  mg daily   - Monitor BP closely at home   - Return/ED precautions discussed in detail  - Close follow-up in 1 week [phone]

## 2021-02-24 DIAGNOSIS — I10 ESSENTIAL HYPERTENSION: ICD-10-CM

## 2021-02-25 RX ORDER — LISINOPRIL AND HYDROCHLOROTHIAZIDE 25; 20 MG/1; MG/1
TABLET ORAL
Qty: 90 TABLET | Refills: 0 | Status: SHIPPED | OUTPATIENT
Start: 2021-02-25 | End: 2021-05-25

## 2021-02-25 RX ORDER — DILTIAZEM HYDROCHLORIDE 120 MG/1
CAPSULE, EXTENDED RELEASE ORAL
Qty: 90 CAPSULE | Refills: 1 | Status: SHIPPED | OUTPATIENT
Start: 2021-02-25 | End: 2021-09-03 | Stop reason: SDUPTHER

## 2021-03-02 DIAGNOSIS — R10.13 DYSPEPSIA: ICD-10-CM

## 2021-03-02 RX ORDER — PANTOPRAZOLE SODIUM 40 MG/1
TABLET, DELAYED RELEASE ORAL
Qty: 30 TABLET | Refills: 0 | Status: SHIPPED | OUTPATIENT
Start: 2021-03-02 | End: 2021-05-10 | Stop reason: SDUPTHER

## 2021-04-12 DIAGNOSIS — E11.9 TYPE 2 DIABETES MELLITUS WITHOUT COMPLICATION, WITHOUT LONG-TERM CURRENT USE OF INSULIN (HCC): ICD-10-CM

## 2021-04-12 RX ORDER — METFORMIN HYDROCHLORIDE 500 MG/1
TABLET, EXTENDED RELEASE ORAL
Qty: 360 TABLET | Refills: 0 | Status: SHIPPED | OUTPATIENT
Start: 2021-04-12 | End: 2021-08-16

## 2021-04-26 ENCOUNTER — TELEPHONE (OUTPATIENT)
Dept: OBGYN CLINIC | Facility: CLINIC | Age: 56
End: 2021-04-26

## 2021-04-26 NOTE — TELEPHONE ENCOUNTER
Last seen 2019, pt called to say she has been without her period for over a year and is now bleeding again, apt made to evaluate

## 2021-04-27 ENCOUNTER — OFFICE VISIT (OUTPATIENT)
Dept: OBGYN CLINIC | Facility: CLINIC | Age: 56
End: 2021-04-27
Payer: COMMERCIAL

## 2021-04-27 VITALS — SYSTOLIC BLOOD PRESSURE: 130 MMHG | WEIGHT: 202.8 LBS | DIASTOLIC BLOOD PRESSURE: 78 MMHG | BODY MASS INDEX: 36.48 KG/M2

## 2021-04-27 DIAGNOSIS — N95.0 POSTMENOPAUSAL BLEEDING: ICD-10-CM

## 2021-04-27 PROCEDURE — 58100 BIOPSY OF UTERUS LINING: CPT | Performed by: PHYSICIAN ASSISTANT

## 2021-04-27 PROCEDURE — 88305 TISSUE EXAM BY PATHOLOGIST: CPT | Performed by: PATHOLOGY

## 2021-04-27 PROCEDURE — 99213 OFFICE O/P EST LOW 20 MIN: CPT | Performed by: PHYSICIAN ASSISTANT

## 2021-04-27 NOTE — PROGRESS NOTES
Fabian DeL a O  1965    S:  63 y o  female here for a problem visit  She reports a postmenopausal bleed  She says she started with spotting 2 days ago which has now progressed to heavier bleeding with clots - this is identical to her periods in the past   She has some associated back pain  Her last bleed was in 12/2019, evaluated with endometrial biopsy and pelvic ultrasound (secretory endometrium, large fibroids)  Her bleed prior to that was sometime in 2018 (felt to be a normal period at that time)    She has risk factors for endometrial cancer including obesity and diabetes       Past Medical History:   Diagnosis Date    Asthmatic bronchitis with exacerbation     Last Assessed: 8/7/2014    Diabetes mellitus ([facility] [address]     GERD (gastroesophageal reflux disease)     Hyperlipidemia     Hypertension     Impaired fasting glucose     Last Assessed: 1/15/2015    Pneumonia      Family History   Problem Relation Age of Onset    Coronary artery disease Mother     Diabetes Father     Stroke Father         Ischemic    Diabetes Brother     No Known Problems Sister     No Known Problems Daughter     No Known Problems Maternal Grandmother     No Known Problems Maternal Grandfather     No Known Problems Paternal Grandmother     No Known Problems Paternal Grandfather     No Known Problems Son     No Known Problems Maternal Aunt     No Known Problems Maternal Aunt     No Known Problems Maternal Aunt     Cancer Paternal Aunt         unknown type    No Known Problems Paternal Aunt     No Known Problems Paternal Aunt     No Known Problems Paternal Aunt     No Known Problems Paternal Aunt     No Known Problems Paternal Aunt     No Known Problems Paternal Aunt     No Known Problems Paternal Aunt     Cancer Paternal Uncle         unknown type    Cancer Paternal Uncle         unknown type    Breast cancer Cousin         unknown age     Social History     Socioeconomic History    Marital status: /Civil [employer]     Spouse name: None    Number of children: None    Years of education: None    Highest education level: None   Occupational History    None   Social Needs    Financial resource strain: Not hard at all   Patience-Julio insecurity     Worry: Never true     Inability: Never true   HTP needs     Medical: No     Non-medical: No   Tobacco Use    Smoking status: Never Smoker    Smokeless tobacco: Never Used   Substance and Sexual Activity    Alcohol use: No    Drug use: No    Sexual activity: Yes     Partners: Male     Birth control/protection: Female Sterilization   Lifestyle    Physical activity     Days per week: None     Minutes per session: None    Stress: None   Relationships    Social connections     Talks on phone: None     Gets together: None     Attends Jainism service: None     Active member of club or organization: None     Attends meetings of clubs or organizations: None     Relationship status: None    Intimate partner violence     Fear of current or ex partner: None     Emotionally abused: None     Physically abused: None     Forced sexual activity: None   Other Topics Concern    None   Social History Narrative    Uses safety equipment - seatbelts       Review of Systems   Respiratory: Negative  Cardiovascular: Negative  Gastrointestinal: Negative for constipation and diarrhea  Genitourinary: Negative for difficulty urinating, pelvic pain, vaginal bleeding, vaginal discharge, itching or odor  O:  /78 (BP Location: Right arm, Patient Position: Sitting, Cuff Size: Standard)   Wt 92 kg (202 lb 12 8 oz)   LMP 12/12/2019 (Exact Date)   BMI 36 48 kg/m²   She appears well and is in no distress  Abdomen is soft and nontender  External genitals are normal without lesions or rashes  Vagina is normal, moderate dark red bleeding noted  Clots coming from the cervical os     Cervix is normal, no lesions   Uterus is enlarged , nontender,   Adnexa are nontender, no pelvic masses appreciated    PROCEDURE: ENDOMETRIAL BIOPSY    Speculum placed in the vagina without complication  Cervix visualized, swabbed with Betadine  Pipelle introduced through the cervical os  Uterus sounds to 11cm  Large amount of bloody tissue obtained  There were no complications  She tolerated the procedure well  A/P: Recurrent postmenopausal bleeding  Await endometrial biopsy  If normal, will need D&C for further evaluation  If abnormal, referral to GYN-ONC  Patient interested in hysterectomy for definitive management, and is aware of the steps that need to be taken prior to hysterectomy  Aygestin 5mg po TID x 5 days then BID x 5 days then daily x 5 days sent to pharmacy   Call with increase in bleeding or if her bleeding does not resolve

## 2021-05-04 ENCOUNTER — TELEPHONE (OUTPATIENT)
Dept: OBGYN CLINIC | Facility: CLINIC | Age: 56
End: 2021-05-04

## 2021-05-04 DIAGNOSIS — N95.0 PMB (POSTMENOPAUSAL BLEEDING): Primary | ICD-10-CM

## 2021-05-04 NOTE — TELEPHONE ENCOUNTER
----- Message from Johanne Burris PA-C sent at 5/3/2021 12:09 PM EDT -----  Please let Wilton Jackson know that her endometrial biopsy shows no concerning findings  I reviewed this with Dr Rich Almonte  I would like to recheck a pelvic ultrasound and order an [facility] level to see where she is menopause-wise  Once we have those back we will decide if she needs a D&C or whether we can just watch her at this time

## 2021-05-08 DIAGNOSIS — I10 HTN (HYPERTENSION), BENIGN: ICD-10-CM

## 2021-05-08 DIAGNOSIS — I10 ESSENTIAL HYPERTENSION: ICD-10-CM

## 2021-05-09 RX ORDER — CLONIDINE HYDROCHLORIDE 0.1 MG/1
TABLET ORAL
Qty: 180 TABLET | Refills: 3 | Status: SHIPPED | OUTPATIENT
Start: 2021-05-09

## 2021-05-10 ENCOUNTER — APPOINTMENT (OUTPATIENT)
Dept: LAB | Age: 56
End: 2021-05-10
Payer: COMMERCIAL

## 2021-05-10 ENCOUNTER — HOSPITAL ENCOUNTER (OUTPATIENT)
Dept: RADIOLOGY | Age: 56
Discharge: HOME | End: 2021-05-10
Payer: COMMERCIAL

## 2021-05-10 DIAGNOSIS — I10 HTN (HYPERTENSION), BENIGN: ICD-10-CM

## 2021-05-10 DIAGNOSIS — N95.0 PMB (POSTMENOPAUSAL BLEEDING): ICD-10-CM

## 2021-05-10 DIAGNOSIS — I10 ESSENTIAL HYPERTENSION: ICD-10-CM

## 2021-05-10 DIAGNOSIS — R10.13 DYSPEPSIA: ICD-10-CM

## 2021-05-10 LAB — FSH SERPL-ACNC: 23.8 MIU/ML

## 2021-05-10 PROCEDURE — 76830 TRANSVAGINAL US NON-OB: CPT

## 2021-05-10 PROCEDURE — 83001 ASSAY OF GONADOTROPIN (FSH): CPT

## 2021-05-10 PROCEDURE — 76856 US EXAM PELVIC COMPLETE: CPT

## 2021-05-10 PROCEDURE — 36415 COLL VENOUS BLD VENIPUNCTURE: CPT

## 2021-05-10 RX ORDER — PANTOPRAZOLE SODIUM 40 MG/1
40 TABLET, DELAYED RELEASE ORAL DAILY
Qty: 30 TABLET | Refills: 3 | Status: SHIPPED | OUTPATIENT
Start: 2021-05-10 | End: 2021-06-15 | Stop reason: SDUPTHER

## 2021-05-10 RX ORDER — CLONIDINE HYDROCHLORIDE 0.1 MG/1
0.1 TABLET ORAL EVERY 12 HOURS
Qty: 180 TABLET | Refills: 0 | Status: CANCELLED | OUTPATIENT
Start: 2021-05-10

## 2021-05-11 ENCOUNTER — HOSPITAL ENCOUNTER (OUTPATIENT)
Dept: RADIOLOGY | Age: 56
Discharge: HOME | End: 2021-05-11
Payer: COMMERCIAL

## 2021-05-11 VITALS — HEIGHT: 62 IN | WEIGHT: 200 LBS | BODY MASS INDEX: 36.8 KG/M2

## 2021-05-11 DIAGNOSIS — Z12.31 ENCOUNTER FOR SCREENING MAMMOGRAM FOR MALIGNANT NEOPLASM OF BREAST: ICD-10-CM

## 2021-05-11 PROCEDURE — 77067 SCR MAMMO BI INCL CAD: CPT

## 2021-05-11 PROCEDURE — 77063 BREAST TOMOSYNTHESIS BI: CPT

## 2021-05-14 ENCOUNTER — TELEPHONE (OUTPATIENT)
Dept: OBGYN CLINIC | Facility: CLINIC | Age: 56
End: 2021-05-14

## 2021-05-14 NOTE — TELEPHONE ENCOUNTER
She has a thickened endometrial stripe, biopsy was benign  Based on my chart review, would recommend hysteroscopy w/ d&c given this is recurrent + markedly thickened stripe   Would set her up for pre-op consult with MD

## 2021-05-14 NOTE — TELEPHONE ENCOUNTER
Pt  See's her U/S results are available on [facility] and wants them interpreted heading into the weekend  Pt   Was also advised that her provider is not in the office today    Best number ending in: [phone]

## 2021-05-21 ENCOUNTER — OFFICE VISIT (OUTPATIENT)
Dept: OBGYN CLINIC | Facility: CLINIC | Age: 56
End: 2021-05-21
Payer: COMMERCIAL

## 2021-05-21 VITALS
WEIGHT: 198.6 LBS | HEIGHT: 62 IN | BODY MASS INDEX: 36.55 KG/M2 | SYSTOLIC BLOOD PRESSURE: 124 MMHG | DIASTOLIC BLOOD PRESSURE: 82 MMHG

## 2021-05-21 DIAGNOSIS — N95.0 POST-MENOPAUSAL BLEEDING: Primary | ICD-10-CM

## 2021-05-21 PROCEDURE — 1036F TOBACCO NON-USER: CPT | Performed by: OBSTETRICS & GYNECOLOGY

## 2021-05-21 PROCEDURE — 99214 OFFICE O/P EST MOD 30 MIN: CPT | Performed by: OBSTETRICS & GYNECOLOGY

## 2021-05-21 PROCEDURE — 3008F BODY MASS INDEX DOCD: CPT | Performed by: OBSTETRICS & GYNECOLOGY

## 2021-05-21 NOTE — PROGRESS NOTES
Gynecology   Broderick Payan 63 y o  female MRN: [id number]    Assessment/Plan     Assessment:  Postmenopausal bleeding  Thickened endometrium  Negative EMB    Plan:  Given thickened endometrium and risk factors for endometrial CA we have decided to move forward with D&C/[facility] for more definitive evaluation  Discussed anesthesia, recovery expectation as well as risks of procedure including but not limited to bleeding, infection, and perforation  Consent signed  HPI:  Broderick Payan is a 63 y o  female who presents with recent episode of postmenopausal bleeding  No pain  No VB currently  Historical Information   Past Medical History:   Diagnosis Date    Asthmatic bronchitis with exacerbation     Last Assessed: 8/7/2014    Diabetes mellitus ([facility] [address] )     GERD (gastroesophageal reflux disease)     Hyperlipidemia     Hypertension     Impaired fasting glucose     Last Assessed: 1/15/2015    Pneumonia      Past Surgical History:   Procedure Laterality Date    COLONOSCOPY      HAND SURGERY      OTHER SURGICAL HISTORY      circloge    IN ANTERIOR COLPORRAPHY RPR CYSTOCELE W/CYSTO N/A 6/15/2020    Procedure: ANTERIOR AND POSTERIOR COLPORRHAPHY;  Surgeon: Jose Crowe MD;  [facility];  Service: UroGynecology           IN CYSTOURETHROSCOPY N/A 6/15/2020    Procedure: [facility];  Surgeon: Jose Crowe MD;  [facility];  Service: UroGynecology           IN Janel Bernalillo LIG N/A 6/15/2020    Procedure: COLPOSUSPENSION VAGINAL EXTRAPERITONEAL(ENPLACE);   Surgeon: Jose Crowe MD;  [facility];  Service: UroGynecology           IN SLING OPER STRES INCONTINENCE N/A 6/15/2020    Procedure: PUBOVAGINAL SLING;  Surgeon: Jose Crowe MD;  [facility];  Service: UroGynecology           THYROID SURGERY      THYROIDECTOMY, PARTIAL      TUBAL LIGATION      TUMOR REMOVAL      right wrist    UTERINE FIBROID EMBOLIZATION      Last Assessed: 7/25/2014 OB/GYN History:   OB History        2    Para   2    Term   2            AB        Living   2       SAB        TAB        Ectopic        Multiple        Live Births                     Family History   Problem Relation Age of Onset    Coronary artery disease Mother     Diabetes Father     Stroke Father         Ischemic    Diabetes Brother     No Known Problems Sister     No Known Problems Daughter     No Known Problems Maternal Grandmother     No Known Problems Maternal Grandfather     No Known Problems Paternal Grandmother     No Known Problems Paternal Grandfather     No Known Problems Son     No Known Problems Maternal Aunt     No Known Problems Maternal Aunt     No Known Problems Maternal Aunt     Cancer Paternal Aunt         unknown type    No Known Problems Paternal Aunt     No Known Problems Paternal Aunt     No Known Problems Paternal Aunt     No Known Problems Paternal Aunt     No Known Problems Paternal Aunt     No Known Problems Paternal Aunt     No Known Problems Paternal Aunt     Cancer Paternal Uncle         unknown type    Cancer Paternal Uncle         unknown type    Breast cancer Cousin         unknown age     Social History   Social History     Substance and Sexual Activity   Alcohol Use No     Social History     Substance and Sexual Activity   Drug Use No     Social History     Tobacco Use   Smoking Status Never Smoker   Smokeless Tobacco Never Used     E-Cigarette/Vaping    E-Cigarette Use Never User      E-Cigarette/Vaping Substances    Nicotine No     THC No     CBD No     Flavoring No     Other No     Unknown No        Meds/Allergies   Current Outpatient Medications on File Prior to Visit   Medication Sig    acetaminophen (TYLENOL) 650 mg CR tablet Take 1 tablet (650 mg total) by mouth every 8 (eight) hours as needed for mild pain    albuterol (Ventolin HFA) 90 mcg/act inhaler Inhale 2 puffs every 4 (four) hours as needed for wheezing or shortness of breath    Ascorbic Acid (VITAMIN C PO) Take 1 tablet by mouth daily    atorvastatin (LIPITOR) 40 mg tablet Take 1 tablet (40 mg total) by mouth daily at bedtime    cloNIDine (CATAPRES) 0 1 mg tablet take 1 tablet by mouth every 12 hours    Dilt- MG 24 hr capsule take 1 capsule by mouth once daily    ibuprofen (MOTRIN) 600 mg tablet Take 1 tablet (600 mg total) by mouth every 6 (six) hours as needed for mild pain (Patient taking differently: Take 600 mg by mouth every 6 (six) hours as needed for mild pain PRN)    lisinopril-hydrochlorothiazide (PRINZIDE,ZESTORETIC) 20-25 MG per tablet take 1 tablet by mouth once daily    metFORMIN (GLUCOPHAGE-XR) 500 mg 24 hr tablet take 2 tablets by mouth twice a day with meals    pantoprazole (PROTONIX) 40 mg tablet Take 1 tablet (40 mg total) by mouth daily    RA VITAMIN D-3 25 MCG (1000 UT) tablet take 1 tablet by mouth once daily    norethindrone (AYGESTIN) 5 mg tablet One po TID x 5 days then one po BID x 5 days then one po daily x 5 days (Patient not taking: Reported on 5/21/2021)     No current facility-administered medications on file prior to visit  Allergies   Allergen Reactions    Levaquin [Levofloxacin In D5w] Rash     Review of Systems   Constitution: Negative for chills, decreased appetite, fever and malaise/fatigue  Respiratory: Negative for cough, shortness of breath, sputum production and wheezing  Gastrointestinal: Negative for bloating, abdominal pain, nausea and vomiting  Genitourinary: Negative for flank pain, frequency, hematuria and pelvic pain  Objective   Vitals: Blood pressure 124/82, height 5' 2" (1 575 m), weight 90 1 kg (198 lb 9 6 oz), last menstrual period 12/12/2019, not currently breastfeeding  Physical Exam  Constitutional:       Appearance: Normal appearance  HENT:      Head: Normocephalic  Cardiovascular:      Rate and Rhythm: Normal rate and regular rhythm     Pulmonary:      Effort: Pulmonary effort is normal    Abdominal:      Palpations: Abdomen is soft  Tenderness: There is no abdominal tenderness  Musculoskeletal:         General: No swelling  Neurological:      General: No focal deficit present  Mental Status: She is alert and oriented to person, place, and time  Skin:     General: Skin is warm and dry  Psychiatric:         Mood and Affect: Mood normal          Behavior: Behavior normal    Vitals signs reviewed  EMB 4/27/2021:   - Benign endometrium with prominent breakdown/menstrual change, negative for hyperplasia or carcinoma  Pelvic Sono 5/10/2021:  UTERUS:  The uterus is anteverted in position, measuring 11 7 x 5 0 x 7 5 cm  Large peripherally calcified leiomyoma measuring 6 4 x 6 8 x 6 0 cm is noted  The cervix shows no suspicious abnormality    ENDOMETRIUM:    Thickened measuring 17 mm    OVARIES/ADNEXA:  Ovaries are not visualized    No suspicious adnexal mass or loculated collections  There is no free fluid    IMPRESSION:   Thickened endometrium measuring 17 mm, consider tissue sampling    Large peripherally calcified leiomyoma

## 2021-05-23 DIAGNOSIS — I10 ESSENTIAL HYPERTENSION: ICD-10-CM

## 2021-05-25 RX ORDER — LISINOPRIL AND HYDROCHLOROTHIAZIDE 25; 20 MG/1; MG/1
TABLET ORAL
Qty: 90 TABLET | Refills: 0 | Status: SHIPPED | OUTPATIENT
Start: 2021-05-25 | End: 2021-06-01 | Stop reason: SDUPTHER

## 2021-06-01 DIAGNOSIS — I10 ESSENTIAL HYPERTENSION: ICD-10-CM

## 2021-06-01 RX ORDER — LISINOPRIL AND HYDROCHLOROTHIAZIDE 25; 20 MG/1; MG/1
1 TABLET ORAL DAILY
Qty: 90 TABLET | Refills: 0 | Status: SHIPPED | OUTPATIENT
Start: 2021-06-01 | End: 2021-11-30

## 2021-06-04 ENCOUNTER — ANESTHESIA EVENT (OUTPATIENT)
Dept: PERIOP | Facility: AMBULARY SURGERY CENTER | Age: 56
End: 2021-06-04
Payer: COMMERCIAL

## 2021-06-15 DIAGNOSIS — R10.13 DYSPEPSIA: ICD-10-CM

## 2021-06-15 RX ORDER — PANTOPRAZOLE SODIUM 40 MG/1
40 TABLET, DELAYED RELEASE ORAL DAILY
Qty: 90 TABLET | Refills: 1 | Status: SHIPPED | OUTPATIENT
Start: 2021-06-15 | End: 2022-02-07 | Stop reason: SDUPTHER

## 2021-06-15 NOTE — PRE-PROCEDURE INSTRUCTIONS
Pre-Surgery Instructions:   Medication Instructions    acetaminophen (TYLENOL) 650 mg CR tablet Instructed to take as needed including DOS with sips water    albuterol (Ventolin HFA) 90 mcg/act inhaler Instructed to take as needed including DOS-Instructed to bring DOS    Ascorbic Acid (VITAMIN C PO) Instructed patient per Anesthesia Guidelines   cloNIDine (CATAPRES) 0 1 mg tablet Instructed to take per normal schedule @ Bedtime    Dilt- MG 24 hr capsule Instructed to take per normal schedule including DOS with sips water    ibuprofen (MOTRIN) 600 mg tablet Instructed patient per Anesthesia Guidelines   lisinopril-hydrochlorothiazide (PRINZIDE,ZESTORETIC) 20-25 MG per tablet Instructed to take per normal schedule except DOS    metFORMIN (GLUCOPHAGE-XR) 500 mg 24 hr tablet Instructed to take per normal schedule except DOS    pantoprazole (PROTONIX) 40 mg tablet Instructed to take per normal schedule including DOS with sips water    RA VITAMIN D-3 25 MCG (1000 UT) tablet Instructed patient per Anesthesia Guidelines  Preop Instructions per My Surgical Experience booklet,medications per anesthesia guidelines and showering instructions per [facility] protocol using an antibacterial soap reviewed  Pt  Verbalized understanding of current visitor restrictions  Instructed to avoid all ASA/NSAIDs and OTC Vit/Supp from now until after surgery  Tylenol ok prn  Pt  Verbalized an understanding of all instructions reviewed and offers no concerns at this time

## 2021-06-18 ENCOUNTER — ANESTHESIA (OUTPATIENT)
Dept: PERIOP | Facility: AMBULARY SURGERY CENTER | Age: 56
End: 2021-06-18
Payer: COMMERCIAL

## 2021-06-18 ENCOUNTER — HOSPITAL ENCOUNTER (OUTPATIENT)
Facility: AMBULARY SURGERY CENTER | Age: 56
Setting detail: OUTPATIENT SURGERY
Discharge: HOME | End: 2021-06-18
Attending: OBSTETRICS & GYNECOLOGY | Admitting: OBSTETRICS & GYNECOLOGY
Payer: COMMERCIAL

## 2021-06-18 VITALS
SYSTOLIC BLOOD PRESSURE: 149 MMHG | WEIGHT: 198 LBS | HEIGHT: 62 IN | TEMPERATURE: 97.9 F | OXYGEN SATURATION: 95 % | RESPIRATION RATE: 18 BRPM | HEART RATE: 65 BPM | DIASTOLIC BLOOD PRESSURE: 80 MMHG | BODY MASS INDEX: 36.44 KG/M2

## 2021-06-18 DIAGNOSIS — R93.89 THICKENED ENDOMETRIUM: ICD-10-CM

## 2021-06-18 DIAGNOSIS — N95.0 PMB (POSTMENOPAUSAL BLEEDING): ICD-10-CM

## 2021-06-18 PROBLEM — Z98.890 S/P D&C (STATUS POST DILATION AND CURETTAGE): Status: ACTIVE | Noted: 2021-06-18

## 2021-06-18 LAB — GLUCOSE SERPL-MCNC: 134 MG/DL (ref 65–140)

## 2021-06-18 PROCEDURE — 58558 HYSTEROSCOPY BIOPSY: CPT | Performed by: OBSTETRICS & GYNECOLOGY

## 2021-06-18 PROCEDURE — 82948 REAGENT STRIP/BLOOD GLUCOSE: CPT

## 2021-06-18 PROCEDURE — 88305 TISSUE EXAM BY PATHOLOGIST: CPT | Performed by: PATHOLOGY

## 2021-06-18 PROCEDURE — 99024 POSTOP FOLLOW-UP VISIT: CPT | Performed by: OBSTETRICS & GYNECOLOGY

## 2021-06-18 RX ORDER — SODIUM CHLORIDE, SODIUM LACTATE, POTASSIUM CHLORIDE, CALCIUM CHLORIDE 600; 310; 30; 20 MG/100ML; MG/100ML; MG/100ML; MG/100ML
100 INJECTION, SOLUTION INTRAVENOUS CONTINUOUS
Status: DISCONTINUED | OUTPATIENT
Start: 2021-06-18 | End: 2021-06-18 | Stop reason: HOSPADM

## 2021-06-18 RX ORDER — ONDANSETRON 2 MG/ML
4 INJECTION INTRAMUSCULAR; INTRAVENOUS ONCE AS NEEDED
Status: DISCONTINUED | OUTPATIENT
Start: 2021-06-18 | End: 2021-06-18 | Stop reason: HOSPADM

## 2021-06-18 RX ORDER — HYDROMORPHONE HCL/PF 1 MG/ML
0.5 SYRINGE (ML) INJECTION
Status: DISCONTINUED | OUTPATIENT
Start: 2021-06-18 | End: 2021-06-18 | Stop reason: HOSPADM

## 2021-06-18 RX ORDER — MAGNESIUM HYDROXIDE 1200 MG/15ML
LIQUID ORAL AS NEEDED
Status: DISCONTINUED | OUTPATIENT
Start: 2021-06-18 | End: 2021-06-18 | Stop reason: HOSPADM

## 2021-06-18 RX ORDER — MIDAZOLAM HYDROCHLORIDE 2 MG/2ML
INJECTION, SOLUTION INTRAMUSCULAR; INTRAVENOUS AS NEEDED
Status: DISCONTINUED | OUTPATIENT
Start: 2021-06-18 | End: 2021-06-18

## 2021-06-18 RX ORDER — PROPOFOL 10 MG/ML
INJECTION, EMULSION INTRAVENOUS AS NEEDED
Status: DISCONTINUED | OUTPATIENT
Start: 2021-06-18 | End: 2021-06-18

## 2021-06-18 RX ORDER — ACETAMINOPHEN 325 MG/1
975 TABLET ORAL EVERY 6 HOURS PRN
Status: DISCONTINUED | OUTPATIENT
Start: 2021-06-18 | End: 2021-06-18 | Stop reason: HOSPADM

## 2021-06-18 RX ORDER — DEXAMETHASONE SODIUM PHOSPHATE 10 MG/ML
INJECTION, SOLUTION INTRAMUSCULAR; INTRAVENOUS AS NEEDED
Status: DISCONTINUED | OUTPATIENT
Start: 2021-06-18 | End: 2021-06-18

## 2021-06-18 RX ORDER — FENTANYL CITRATE 50 UG/ML
INJECTION, SOLUTION INTRAMUSCULAR; INTRAVENOUS AS NEEDED
Status: DISCONTINUED | OUTPATIENT
Start: 2021-06-18 | End: 2021-06-18

## 2021-06-18 RX ORDER — ONDANSETRON 2 MG/ML
INJECTION INTRAMUSCULAR; INTRAVENOUS AS NEEDED
Status: DISCONTINUED | OUTPATIENT
Start: 2021-06-18 | End: 2021-06-18

## 2021-06-18 RX ORDER — KETOROLAC TROMETHAMINE 30 MG/ML
INJECTION, SOLUTION INTRAMUSCULAR; INTRAVENOUS AS NEEDED
Status: DISCONTINUED | OUTPATIENT
Start: 2021-06-18 | End: 2021-06-18

## 2021-06-18 RX ORDER — SODIUM CHLORIDE, SODIUM LACTATE, POTASSIUM CHLORIDE, CALCIUM CHLORIDE 600; 310; 30; 20 MG/100ML; MG/100ML; MG/100ML; MG/100ML
INJECTION, SOLUTION INTRAVENOUS CONTINUOUS PRN
Status: DISCONTINUED | OUTPATIENT
Start: 2021-06-18 | End: 2021-06-18

## 2021-06-18 RX ORDER — ONDANSETRON 2 MG/ML
4 INJECTION INTRAMUSCULAR; INTRAVENOUS EVERY 6 HOURS PRN
Status: DISCONTINUED | OUTPATIENT
Start: 2021-06-18 | End: 2021-06-18 | Stop reason: HOSPADM

## 2021-06-18 RX ORDER — FENTANYL CITRATE/PF 50 MCG/ML
25 SYRINGE (ML) INJECTION
Status: DISCONTINUED | OUTPATIENT
Start: 2021-06-18 | End: 2021-06-18 | Stop reason: HOSPADM

## 2021-06-18 RX ORDER — LIDOCAINE HYDROCHLORIDE 10 MG/ML
INJECTION, SOLUTION EPIDURAL; INFILTRATION; INTRACAUDAL; PERINEURAL AS NEEDED
Status: DISCONTINUED | OUTPATIENT
Start: 2021-06-18 | End: 2021-06-18

## 2021-06-18 RX ADMIN — ONDANSETRON 4 MG: 2 INJECTION INTRAMUSCULAR; INTRAVENOUS at 09:59

## 2021-06-18 RX ADMIN — DEXAMETHASONE SODIUM PHOSPHATE 4 MG: 10 INJECTION, SOLUTION INTRAMUSCULAR; INTRAVENOUS at 09:59

## 2021-06-18 RX ADMIN — KETOROLAC TROMETHAMINE 30 MG: 30 INJECTION, SOLUTION INTRAMUSCULAR at 10:11

## 2021-06-18 RX ADMIN — FENTANYL CITRATE 25 MCG: 50 INJECTION, SOLUTION INTRAMUSCULAR; INTRAVENOUS at 10:02

## 2021-06-18 RX ADMIN — SODIUM CHLORIDE, SODIUM LACTATE, POTASSIUM CHLORIDE, AND CALCIUM CHLORIDE: .6; .31; .03; .02 INJECTION, SOLUTION INTRAVENOUS at 09:01

## 2021-06-18 RX ADMIN — FENTANYL CITRATE 25 MCG: 50 INJECTION, SOLUTION INTRAMUSCULAR; INTRAVENOUS at 10:05

## 2021-06-18 RX ADMIN — FENTANYL CITRATE 25 MCG: 50 INJECTION, SOLUTION INTRAMUSCULAR; INTRAVENOUS at 10:09

## 2021-06-18 RX ADMIN — FENTANYL CITRATE 25 MCG: 50 INJECTION, SOLUTION INTRAMUSCULAR; INTRAVENOUS at 09:59

## 2021-06-18 RX ADMIN — LIDOCAINE HYDROCHLORIDE 50 MG: 10 INJECTION, SOLUTION EPIDURAL; INFILTRATION; INTRACAUDAL at 09:54

## 2021-06-18 RX ADMIN — MIDAZOLAM HYDROCHLORIDE 2 MG: 1 INJECTION, SOLUTION INTRAMUSCULAR; INTRAVENOUS at 09:47

## 2021-06-18 RX ADMIN — PROPOFOL 200 MG: 10 INJECTION, EMULSION INTRAVENOUS at 09:54

## 2021-06-18 NOTE — ANESTHESIA POSTPROCEDURE EVALUATION
Post-Op Assessment Note    CV Status:  Stable  Pain Score: 0    Pain management: adequate     Mental Status:  Alert and awake   Hydration Status:  Euvolemic   PONV Controlled:  Controlled   Airway Patency:  Patent      Post Op Vitals Reviewed: Yes      Staff: CRNA         No complications documented      BP (!) 184/90 (06/18/21 1022)    Temp (!) 96 8 °F (36 °C) (06/18/21 1022)    Pulse 85 (06/18/21 1022)   Resp 15 (06/18/21 1022)    SpO2 95 % (06/18/21 1022)

## 2021-06-18 NOTE — OP NOTE
OPERATIVE REPORT  PATIENT NAME: Juan Kruse    :  1965  MRN: [id number]  Pt Location: [facility] OR [facility]OM 04    SURGERY DATE: 2021    Surgeon(s) and Role:     * Patrick Silver MD - Primary     * René Mcclellan MD - Assisting    Preop Diagnosis:  PMB (postmenopausal bleeding) [N95 0]  Thickened endometrium [R93 89]    Post-Op Diagnosis Codes:     * PMB (postmenopausal bleeding) [N95 0]     * Thickened endometrium [R93 89]    Procedure(s) (LRB):  DILATATION AND CURETTAGE (D&C) WITH HYSTEROSCOPY (N/A)    Specimen(s):  ID Type Source Tests Collected by Time Destination   1 : ENDOMETRIAL CURETTINGS Tissue Endometrium TISSUE EXAM Patrick Silver MD 2021 1009        Estimated Blood Loss:   Minimal    Anesthesia Type:   General LMA    Operative Indications:  PMB (postmenopausal bleeding) [N95 0]  Thickened endometrium [R93 89]    Operative Findings:  Normal appearing external female genitalia  Normal appearing vaginal mucosa  Small, nulliparous, grossly normal appearing cervix  Small, anteverted, mobile uterus  Grossly normal atrophic appearing endometrial cavity with no evidence of polypoid tissue or fibroids  Bilateral ostia visualized      Complications:   None    Procedure and Technique:  Patient was identified in the preop holding area as well as the operating suite  Procedure was reviewed and patient consented verbally once again  She underwent successful induction of general anesthesia using LMA  She was placed in the dorsal lithotomy position using yellowfin stirrups  She had pneumatic compression boots in place  She had a Betadine vaginal prep and was draped in sterile fashion  A operative timeout was accomplished  The cervix was nulliparous appearing  Uterus was anteverted and normal in size  There were no adnexal masses noted      The bladder was drained using a straight catheter   A weighted speculum was inserted into the vagina and the anterior lip of the cervix was grasped using a single-tooth tenaculum  Uterus sounded in midposition fashion to 10 cm  The endocervix was dilated to accommodate the [facility] hysteroscope  Using the Fluent fluid management system according to product instructions, the device was primed prior to use  The hysteroscope was then introduced with saline as a distention medium  Hysteroscope was advanced to the uterine fundus and the entire uterine cavity was inspected in a systematic manner  There was noted to be atrophic endometrial tissue  Hysteroscope was withdrawn and sharp curetting was performed, starting at the 12'oclock position and rotating a total of 360 degrees to cover all surfaces  Endometrial tissue was obtained and sent for routine pathology  Fluid deficit was 175 mL  Good hemostasis was noted  Good hemostasis was confirmed at the tenaculum puncture sites  Weighted speculum was then removed from the vagina  At the conclusion of the procedure, all needle, sponge, and instrument counts were noted to be correct x2  Dr Kiya Sofia was present and participated in all key portions of the case      Patient Disposition:  PACU     SIGNATURE: Nick Bar MD  DATE: June 18, 2021  TIME: 10:21 AM

## 2021-06-18 NOTE — H&P
H&P reviewed  After examining the patient I find no changes in the patients condition since the H&P had been written      Vitals:    06/18/21 0849   BP: 139/91   Pulse: 68   Resp: 16   Temp: 97 9 °F (36 6 °C)   SpO2: 98%

## 2021-06-18 NOTE — ANESTHESIA PREPROCEDURE EVALUATION
Procedure:  DILATATION AND CURETTAGE (D&C) WITH HYSTEROSCOPY (N/A Uterus)    Relevant Problems   CARDIO   (+) Hyperlipidemia   (+) Hypertension      ENDO   (+) Type 2 diabetes mellitus (HCC)      GI/HEPATIC   (+) Gastroesophageal reflux disease      MUSCULOSKELETAL   (+) Lower back pain      PULMONARY   (+) Mild intermittent asthma without complication   (+) KIM (obstructive sleep apnea)   (+) KIM on CPAP      Other   (+) Allergic rhinitis   (+) Diverticulosis of colon   (+) Dyspepsia   (+) Fibroids   (+) Iliotibial band syndrome of right side   (+) Olivier's syndrome   (+) Obesity (BMI 30-39 9)   (+) Radicular pain of left upper extremity   (+) Right knee pain   (+) Vitamin D deficiency      Recent labs personally reviewed:  Lab Results   Component Value Date    WBC 5 83 06/01/2020    HGB 12 0 06/01/2020     06/01/2020     Lab Results   Component Value Date     11/05/2015    K 3 9 06/01/2020    BUN 16 06/01/2020    CREATININE 0 60 06/01/2020    GLUCOSE 132 11/05/2015     Lab Results   Component Value Date    PTT 36 09/08/2019      Lab Results   Component Value Date    INR 0 97 09/08/2019     Lab Results   Component Value Date    HGBA1C 7 3 (H) 10/15/2020       Physical Exam    Airway    Mallampati score: III  TM Distance: >3 FB  Neck ROM: full     Dental       Cardiovascular  Rhythm: regular, Rate: normal,     Pulmonary  Breath sounds clear to auscultation,     Other Findings        Anesthesia Plan  ASA Score- 3     Anesthesia Type- general with ASA Monitors  Additional Monitors:   Airway Plan: LMA  Plan Factors-Exercise tolerance (METS): >4 METS  Chart reviewed  Existing labs reviewed  Patient summary reviewed  Induction- intravenous  Postoperative Plan-     Informed Consent- Anesthetic plan and risks discussed with patient  I personally reviewed this patient with the CRNA  Discussed and agreed on the Anesthesia Plan with the CRNA  Leo Roy

## 2021-06-18 NOTE — DISCHARGE INSTRUCTIONS
Dilation and Curettage   WHAT YOU SHOULD KNOW:   Dilation and curettage (D and C) is a procedure to remove tissue from the lining of your uterus  INSTRUCTIONS:   Medicines:   · Pain medicine: You may be given medicine to take away or decrease pain  Do not wait until the pain is severe before you take your medicine  · Antibiotics: This medicine will help fight or prevent an infection  · Take your medicine as directed  Call your healthcare provider if you think your medicine is not helping or if you have side effects  Tell him if you are allergic to any medicine  Keep a list of the medicines, vitamins, and herbs you take  Include the amounts, and when and why you take them  Bring the list or the pill bottles to follow-up visits  Carry your medicine list with you in case of an emergency  Follow up with your healthcare provider as directed:  Write down your questions so you remember to ask them during your visits  Activity:  Ask your primary healthcare provider when you can return to your normal activities  Contact your primary healthcare provider if:   · You have foul-smelling vaginal discharge  · You do not get your monthly period  · You feel depressed or anxious  · You feel very tired and weak  · You have questions or concerns about your condition or care  Return to the emergency department if:   · You have heavy vaginal bleeding that soaks 1 pad in 1 hour for 2 hours in a row  · You have a fever and abdominal cramps  · Your pain does not get better, even after treatment  © 2014 [employer] is for End User's use only and may not be sold, redistributed or otherwise used for commercial purposes  All illustrations and images included in CareNotes® are the copyrighted property of A [employer]  or Ata Garner  The above information is an  only  It is not intended as medical advice for individual conditions or treatments   Talk to your doctor, nurse or pharmacist before following any medical regimen to see if it is safe and effective for you

## 2021-06-21 LAB — GLUCOSE SERPL-MCNC: 162 MG/DL (ref 65–140)

## 2021-06-22 ENCOUNTER — TELEPHONE (OUTPATIENT)
Dept: OBGYN CLINIC | Facility: CLINIC | Age: 56
End: 2021-06-22

## 2021-07-13 DIAGNOSIS — E11.9 TYPE 2 DIABETES MELLITUS WITHOUT COMPLICATION, WITHOUT LONG-TERM CURRENT USE OF INSULIN (HCC): ICD-10-CM

## 2021-08-16 RX ORDER — METFORMIN HYDROCHLORIDE 500 MG/1
1000 TABLET, EXTENDED RELEASE ORAL 2 TIMES DAILY WITH MEALS
Qty: 360 TABLET | Refills: 0 | Status: SHIPPED | OUTPATIENT
Start: 2021-08-16 | End: 2021-11-08

## 2021-08-27 ENCOUNTER — TELEPHONE (OUTPATIENT)
Dept: FAMILY MEDICINE CLINIC | Facility: CLINIC | Age: 56
End: 2021-08-27

## 2021-08-27 NOTE — TELEPHONE ENCOUNTER
Received pharmacy request to refill Dilt XR 120mg, patient has not been seen this year, needs diabetic f/u  Please schedule

## 2021-09-03 ENCOUNTER — OFFICE VISIT (OUTPATIENT)
Dept: FAMILY MEDICINE CLINIC | Facility: CLINIC | Age: 56
End: 2021-09-03

## 2021-09-03 VITALS
RESPIRATION RATE: 16 BRPM | DIASTOLIC BLOOD PRESSURE: 80 MMHG | SYSTOLIC BLOOD PRESSURE: 112 MMHG | OXYGEN SATURATION: 99 % | BODY MASS INDEX: 36.8 KG/M2 | TEMPERATURE: 97.3 F | HEART RATE: 61 BPM | WEIGHT: 200 LBS | HEIGHT: 62 IN

## 2021-09-03 DIAGNOSIS — E11.9 TYPE 2 DIABETES MELLITUS WITHOUT COMPLICATION, WITHOUT LONG-TERM CURRENT USE OF INSULIN (HCC): Primary | ICD-10-CM

## 2021-09-03 DIAGNOSIS — I10 ESSENTIAL HYPERTENSION: ICD-10-CM

## 2021-09-03 LAB — SL AMB POCT HEMOGLOBIN AIC: 7.1 (ref ?–6.5)

## 2021-09-03 PROCEDURE — 3051F HG A1C>EQUAL 7.0%<8.0%: CPT | Performed by: FAMILY MEDICINE

## 2021-09-03 PROCEDURE — 3079F DIAST BP 80-89 MM HG: CPT | Performed by: FAMILY MEDICINE

## 2021-09-03 PROCEDURE — 99213 OFFICE O/P EST LOW 20 MIN: CPT | Performed by: FAMILY MEDICINE

## 2021-09-03 PROCEDURE — 3008F BODY MASS INDEX DOCD: CPT | Performed by: FAMILY MEDICINE

## 2021-09-03 PROCEDURE — 1036F TOBACCO NON-USER: CPT | Performed by: FAMILY MEDICINE

## 2021-09-03 PROCEDURE — 3074F SYST BP LT 130 MM HG: CPT | Performed by: FAMILY MEDICINE

## 2021-09-03 PROCEDURE — 83036 HEMOGLOBIN GLYCOSYLATED A1C: CPT | Performed by: FAMILY MEDICINE

## 2021-09-03 RX ORDER — DILTIAZEM HYDROCHLORIDE 120 MG/1
120 CAPSULE, EXTENDED RELEASE ORAL
Qty: 30 CAPSULE | Refills: 3 | Status: SHIPPED | OUTPATIENT
Start: 2021-09-03 | End: 2021-12-29

## 2021-09-03 NOTE — ASSESSMENT & PLAN NOTE
Stable  - Pt's last A1C taken on 10/2020 was 7 3  In the office today it is 7 1   - Currently taking metformin 1000 mg BID   - Recommended continuing to improve her diet and exercise  - Continue on the same medication regimen   - f/u in 3 months      Lab Results   Component Value Date    HGBA1C 7 1 (A) 09/03/2021

## 2021-09-03 NOTE — PROGRESS NOTES
Assessment/Plan:    No problem-specific Assessment & Plan notes found for this encounter  Diagnoses and all orders for this visit:    Type 2 diabetes mellitus without complication, without long-term current use of insulin (HCC)  -     POCT hemoglobin A1c  -     Basic metabolic panel; Future    Essential hypertension  -     diltiazem (Dilt-XR) 120 MG 24 hr capsule; Take 1 capsule (120 mg total) by mouth daily in the early morning  -     Basic metabolic panel; Future          Subjective:      Patient ID: Hang Gutierrez is a 63 y o  female  Patient report   The following portions of the patient's history were reviewed and updated as appropriate: allergies, current medications, past family history, past medical history, past social history, past surgical history and problem list     Review of Systems   Constitutional: Negative for chills and fever  HENT: Negative for ear pain and sore throat  Eyes: Negative for pain and visual disturbance  Respiratory: Negative for cough and shortness of breath  Cardiovascular: Negative for chest pain and palpitations  Gastrointestinal: Negative for abdominal pain and vomiting  Genitourinary: Negative for dysuria and hematuria  Musculoskeletal: Negative for arthralgias and back pain  Skin: Negative for color change and rash  Neurological: Negative for seizures and syncope  All other systems reviewed and are negative          Objective:      /80 (BP Location: Left arm, Patient Position: Sitting, Cuff Size: Large)   Pulse 61   Temp (!) 97 3 °F (36 3 °C) (Temporal)   Resp 16   Ht 5' 2" (1 575 m)   Wt 90 7 kg (200 lb)   LMP 12/12/2019 (Exact Date)   SpO2 99%   BMI 36 58 kg/m²          Physical Exam

## 2021-09-03 NOTE — ASSESSMENT & PLAN NOTE
Improved  - Pt's BP in the office today is 112/80, improved from last BP on 6/18/21 of 149/80   - Currently taking clonidine 0 1 mg, lisinopril-hydrochlorothiazide 20-25 mg, and diltiazem 120 mg   - Recommended continuing on the same medication regimen  - f/u in 3 months

## 2021-09-03 NOTE — PROGRESS NOTES
Assessment/Plan:    Type 2 diabetes mellitus ([facility] )  Stable  - Pt's last A1C taken on 10/2020 was 7 3  In the office today it is 7 1   - Currently taking metformin 1000 mg BID   - Recommended continuing to improve her diet and exercise  - Continue on the same medication regimen   - f/u in 3 months  Lab Results   Component Value Date    HGBA1C 7 1 (A) 09/03/2021       Hypertension  Improved  - Pt's BP in the office today is 112/80, improved from last BP on 6/18/21 of 149/80   - Currently taking clonidine 0 1 mg, lisinopril-hydrochlorothiazide 20-25 mg, and diltiazem 120 mg   - Recommended continuing on the same medication regimen  - f/u in 3 months  Diagnoses and all orders for this visit:    Type 2 diabetes mellitus without complication, without long-term current use of insulin (HCC)  -     POCT hemoglobin A1c  -     Basic metabolic panel; Future    Essential hypertension  -     diltiazem (Dilt-XR) 120 MG 24 hr capsule; Take 1 capsule (120 mg total) by mouth daily in the early morning  -     Basic metabolic panel; Future          Subjective:      Patient ID: Thu Bowen is a 63 y o  female  HPI  63 YOF presenting for a DM f/u  A1C in the office today is 7 1, slightly decreased from 7 3 in 10/2020  She is currently taking Metformin 1000 mg BID  She reports that she sees the ophthalmologist on a yearly basis  She denies any complaints  To  The following portions of the patient's history were reviewed and updated as appropriate: She  has a past medical history of Asthma, Asthmatic bronchitis with exacerbation, [facility] ), GERD (gastroesophageal reflux disease), History of COVID-19 (10/27/21), Hyperlipidemia, Hypertension, Impaired fasting glucose, Pneumonia, and Sleep apnea    She   Patient Active Problem List    Diagnosis Date Noted    S/P D&C (status post dilation and curettage) 06/18/2021    Iliotibial band syndrome of right side 02/06/2021    COVID-19 10/23/2020    Bladder prolapse, female, acquired 05/28/2020    Dyspepsia 04/03/2020    KIM (obstructive sleep apnea)     Post-menopausal bleeding 12/11/2019    Abnormal TSH 09/09/2019    Subconjunctival hemorrhage of left eye 09/09/2019    Obesity (BMI 30-39 9) 05/05/2019    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/2018    Right knee pain 11/20/2017    Radicular pain of left upper extremity 11/20/2017    Hypokalemia 10/07/2016    Mild intermittent asthma without complication 25/55/9228    KIM on CPAP 11/04/2015    Diverticulosis of colon 11/03/2015    Type 2 diabetes mellitus ([facility] ) 07/21/2015    Neck pain 07/07/2015    Urinary incontinence in female 04/16/2015    Fibroids 04/16/2015    Allergic rhinitis 03/27/2014    Microscopic hematuria 03/27/2014    Gastroesophageal reflux disease 02/11/2014    Lower back pain 08/05/2013    Vitamin D deficiency 04/04/2013    Hyperlipidemia 01/23/2013    Hypertension 01/23/2013     She  has a past surgical history that includes Thyroidectomy, partial; Tubal ligation; Colonoscopy; pr anterior colporraphy rpr cystocele w/cysto (N/A, 6/15/2020); pr sling oper stres incontinence (N/A, 6/15/2020); pr cystourethroscopy (N/A, 6/15/2020); pr revaginal prolapse,sacrosp lig (N/A, 6/15/2020); Cervical cerclage; Hand surgery (Right); and pr hysteroscopy,w/endo bx (N/A, 6/18/2021)  Her family history includes Breast cancer in her cousin; Cancer in her paternal aunt, paternal uncle, and paternal uncle; Coronary artery disease in her mother; Diabetes in her brother and father; No Known Problems in her daughter, maternal aunt, maternal aunt, maternal aunt, maternal grandfather, maternal grandmother, paternal aunt, paternal aunt, paternal aunt, paternal aunt, paternal aunt, paternal aunt, paternal aunt, paternal grandfather, paternal grandmother, sister, and son; Stroke in her father  She  reports that she has never smoked   She has never used smokeless tobacco  She reports that she does not drink alcohol and does not use drugs  Current Outpatient Medications   Medication Sig Dispense Refill    acetaminophen (TYLENOL) 650 mg CR tablet Take 1 tablet (650 mg total) by mouth every 8 (eight) hours as needed for mild pain 30 tablet 0    albuterol (Ventolin HFA) 90 mcg/act inhaler Inhale 2 puffs every 4 (four) hours as needed for wheezing or shortness of breath 1 Inhaler 3    Ascorbic Acid (VITAMIN C PO) Take 1 tablet by mouth daily      cloNIDine (CATAPRES) 0 1 mg tablet take 1 tablet by mouth every 12 hours (Patient taking differently: Take 0 1 mg by mouth daily at bedtime ) 180 tablet 3    diltiazem (Dilt-XR) 120 MG 24 hr capsule Take 1 capsule (120 mg total) by mouth daily in the early morning 30 capsule 3    ibuprofen (MOTRIN) 600 mg tablet Take 1 tablet (600 mg total) by mouth every 6 (six) hours as needed for mild pain (Patient taking differently: Take 600 mg by mouth every 6 (six) hours as needed for mild pain PRN) 30 tablet 0    lisinopril-hydrochlorothiazide (PRINZIDE,ZESTORETIC) 20-25 MG per tablet Take 1 tablet by mouth daily (Patient taking differently: Take 1 tablet by mouth daily in the early morning ) 90 tablet 0    metFORMIN (GLUCOPHAGE-XR) 500 mg 24 hr tablet Take 2 tablets (1,000 mg total) by mouth 2 (two) times a day with meals Takes 2 500 mg Tablets = 1000 mg Twice/day 360 tablet 0    pantoprazole (PROTONIX) 40 mg tablet Take 1 tablet (40 mg total) by mouth daily 90 tablet 1    RA VITAMIN D-3 25 MCG (1000 UT) tablet take 1 tablet by mouth once daily 90 tablet 0     No current facility-administered medications for this visit  She is allergic to levaquin [levofloxacin]       Review of Systems   Constitutional: Negative for chills, fatigue and fever  HENT: Negative for congestion  Eyes: Negative for redness  Respiratory: Negative for chest tightness and shortness of breath  Cardiovascular: Negative for chest pain, palpitations and leg swelling     Gastrointestinal: Negative for constipation, diarrhea, nausea and vomiting  Skin: Negative for pallor and wound  Neurological: Negative for dizziness and headaches  Objective:      /80 (BP Location: Left arm, Patient Position: Sitting, Cuff Size: Large)   Pulse 61   Temp (!) 97 3 °F (36 3 °C) (Temporal)   Resp 16   Ht 5' 2" (1 575 m)   Wt 90 7 kg (200 lb)   LMP 12/12/2019 (Exact Date)   SpO2 99%   BMI 36 58 kg/m²          Physical Exam  Vitals reviewed  Constitutional:       Appearance: Normal appearance  HENT:      Head: Normocephalic  Mouth/Throat:      Mouth: Mucous membranes are moist    Eyes:      Conjunctiva/sclera: Conjunctivae normal    Cardiovascular:      Rate and Rhythm: Normal rate and regular rhythm  Pulses: Normal pulses  Heart sounds: Normal heart sounds  No murmur heard  Pulmonary:      Effort: Pulmonary effort is normal       Breath sounds: Normal breath sounds  Musculoskeletal:      Cervical back: Normal range of motion  Skin:     General: Skin is warm and dry  Neurological:      Mental Status: She is alert     Psychiatric:         Mood and Affect: Mood normal

## 2021-09-21 ENCOUNTER — TELEPHONE (OUTPATIENT)
Dept: FAMILY MEDICINE CLINIC | Facility: CLINIC | Age: 56
End: 2021-09-21

## 2021-09-21 NOTE — TELEPHONE ENCOUNTER
[facility]

## 2021-10-25 ENCOUNTER — TELEMEDICINE (OUTPATIENT)
Dept: FAMILY MEDICINE CLINIC | Facility: CLINIC | Age: 56
End: 2021-10-25

## 2021-10-25 DIAGNOSIS — J45.21 MILD INTERMITTENT ASTHMA WITH EXACERBATION: ICD-10-CM

## 2021-10-25 DIAGNOSIS — J06.9 VIRAL URI: Primary | ICD-10-CM

## 2021-10-25 PROCEDURE — U0003 INFECTIOUS AGENT DETECTION BY NUCLEIC ACID (DNA OR RNA); SEVERE ACUTE RESPIRATORY SYNDROME CORONAVIRUS 2 (SARS-COV-2) (CORONAVIRUS DISEASE [COVID-19]), AMPLIFIED PROBE TECHNIQUE, MAKING USE OF HIGH THROUGHPUT TECHNOLOGIES AS DESCRIBED BY CMS-2020-01-R: HCPCS | Performed by: FAMILY MEDICINE

## 2021-10-25 PROCEDURE — U0005 INFEC AGEN DETEC AMPLI PROBE: HCPCS | Performed by: FAMILY MEDICINE

## 2021-10-25 PROCEDURE — 99213 OFFICE O/P EST LOW 20 MIN: CPT | Performed by: FAMILY MEDICINE

## 2021-10-26 RX ORDER — ALBUTEROL SULFATE 90 UG/1
2 AEROSOL, METERED RESPIRATORY (INHALATION) EVERY 4 HOURS PRN
Qty: 8 G | Refills: 0 | Status: SHIPPED | OUTPATIENT
Start: 2021-10-26

## 2021-11-07 DIAGNOSIS — E11.9 TYPE 2 DIABETES MELLITUS WITHOUT COMPLICATION, WITHOUT LONG-TERM CURRENT USE OF INSULIN (HCC): ICD-10-CM

## 2021-11-08 RX ORDER — METFORMIN HYDROCHLORIDE 500 MG/1
1000 TABLET, EXTENDED RELEASE ORAL 2 TIMES DAILY WITH MEALS
Qty: 360 TABLET | Refills: 0 | Status: SHIPPED | OUTPATIENT
Start: 2021-11-08 | End: 2022-02-02

## 2021-11-30 DIAGNOSIS — I10 ESSENTIAL HYPERTENSION: ICD-10-CM

## 2021-11-30 RX ORDER — LISINOPRIL AND HYDROCHLOROTHIAZIDE 25; 20 MG/1; MG/1
TABLET ORAL
Qty: 90 TABLET | Refills: 0 | Status: SHIPPED | OUTPATIENT
Start: 2021-11-30 | End: 2022-03-01

## 2021-12-09 ENCOUNTER — TELEMEDICINE (OUTPATIENT)
Dept: FAMILY MEDICINE CLINIC | Facility: CLINIC | Age: 56
End: 2021-12-09

## 2021-12-09 DIAGNOSIS — B34.9 VIRAL INFECTION, UNSPECIFIED: Primary | ICD-10-CM

## 2021-12-09 PROCEDURE — 99213 OFFICE O/P EST LOW 20 MIN: CPT | Performed by: FAMILY MEDICINE

## 2021-12-09 PROCEDURE — U0003 INFECTIOUS AGENT DETECTION BY NUCLEIC ACID (DNA OR RNA); SEVERE ACUTE RESPIRATORY SYNDROME CORONAVIRUS 2 (SARS-COV-2) (CORONAVIRUS DISEASE [COVID-19]), AMPLIFIED PROBE TECHNIQUE, MAKING USE OF HIGH THROUGHPUT TECHNOLOGIES AS DESCRIBED BY CMS-2020-01-R: HCPCS | Performed by: FAMILY MEDICINE

## 2021-12-09 PROCEDURE — U0005 INFEC AGEN DETEC AMPLI PROBE: HCPCS | Performed by: FAMILY MEDICINE

## 2021-12-09 RX ORDER — FLUTICASONE PROPIONATE 50 MCG
1 SPRAY, SUSPENSION (ML) NASAL DAILY
Qty: 11.1 ML | Refills: 1 | Status: SHIPPED | OUTPATIENT
Start: 2021-12-09

## 2021-12-10 ENCOUNTER — TELEPHONE (OUTPATIENT)
Dept: FAMILY MEDICINE CLINIC | Facility: CLINIC | Age: 56
End: 2021-12-10

## 2021-12-14 ENCOUNTER — TELEMEDICINE (OUTPATIENT)
Dept: FAMILY MEDICINE CLINIC | Facility: CLINIC | Age: 56
End: 2021-12-14

## 2021-12-14 DIAGNOSIS — U07.1 COVID-19: Primary | ICD-10-CM

## 2021-12-14 PROCEDURE — 99213 OFFICE O/P EST LOW 20 MIN: CPT | Performed by: FAMILY MEDICINE

## 2021-12-29 DIAGNOSIS — I10 ESSENTIAL HYPERTENSION: ICD-10-CM

## 2021-12-29 RX ORDER — DILTIAZEM HYDROCHLORIDE 120 MG/1
CAPSULE, EXTENDED RELEASE ORAL
Qty: 30 CAPSULE | Refills: 3 | Status: SHIPPED | OUTPATIENT
Start: 2021-12-29 | End: 2022-05-04

## 2022-02-02 DIAGNOSIS — E11.9 TYPE 2 DIABETES MELLITUS WITHOUT COMPLICATION, WITHOUT LONG-TERM CURRENT USE OF INSULIN (HCC): ICD-10-CM

## 2022-02-02 RX ORDER — METFORMIN HYDROCHLORIDE 500 MG/1
1000 TABLET, EXTENDED RELEASE ORAL 2 TIMES DAILY WITH MEALS
Qty: 360 TABLET | Refills: 0 | Status: SHIPPED | OUTPATIENT
Start: 2022-02-02 | End: 2022-05-10

## 2022-02-07 ENCOUNTER — OFFICE VISIT (OUTPATIENT)
Dept: FAMILY MEDICINE CLINIC | Facility: CLINIC | Age: 57
End: 2022-02-07

## 2022-02-07 VITALS
OXYGEN SATURATION: 99 % | WEIGHT: 200 LBS | DIASTOLIC BLOOD PRESSURE: 83 MMHG | HEIGHT: 62 IN | TEMPERATURE: 98 F | RESPIRATION RATE: 18 BRPM | BODY MASS INDEX: 36.8 KG/M2 | SYSTOLIC BLOOD PRESSURE: 153 MMHG | HEART RATE: 75 BPM

## 2022-02-07 DIAGNOSIS — Z00.00 ANNUAL PHYSICAL EXAM: Primary | ICD-10-CM

## 2022-02-07 DIAGNOSIS — I10 PRIMARY HYPERTENSION: ICD-10-CM

## 2022-02-07 DIAGNOSIS — R10.13 DYSPEPSIA: ICD-10-CM

## 2022-02-07 DIAGNOSIS — E11.9 TYPE 2 DIABETES MELLITUS WITHOUT COMPLICATION, WITHOUT LONG-TERM CURRENT USE OF INSULIN (HCC): ICD-10-CM

## 2022-02-07 LAB — SL AMB POCT HEMOGLOBIN AIC: 7.5 (ref ?–6.5)

## 2022-02-07 PROCEDURE — 1036F TOBACCO NON-USER: CPT | Performed by: FAMILY MEDICINE

## 2022-02-07 PROCEDURE — 83036 HEMOGLOBIN GLYCOSYLATED A1C: CPT | Performed by: FAMILY MEDICINE

## 2022-02-07 PROCEDURE — 3725F SCREEN DEPRESSION PERFORMED: CPT | Performed by: FAMILY MEDICINE

## 2022-02-07 PROCEDURE — 99396 PREV VISIT EST AGE 40-64: CPT | Performed by: FAMILY MEDICINE

## 2022-02-07 PROCEDURE — 3051F HG A1C>EQUAL 7.0%<8.0%: CPT | Performed by: FAMILY MEDICINE

## 2022-02-07 RX ORDER — PANTOPRAZOLE SODIUM 40 MG/1
40 TABLET, DELAYED RELEASE ORAL DAILY
Qty: 90 TABLET | Refills: 1 | Status: SHIPPED | OUTPATIENT
Start: 2022-02-07 | End: 2022-07-25

## 2022-02-07 NOTE — ASSESSMENT & PLAN NOTE
- Patient presents for annual physical exam   - Screening for cardiovascular disease: yearly CMP and Lipid panel ordered today  - Patient up to date on Screening for colorectal and cervical cancer    - Screening for breast cancer: Last mammogram in 2021  Negative for malignancy     - Counseled the patient on healthy lifestyle habits

## 2022-02-07 NOTE — ASSESSMENT & PLAN NOTE
Patient with elevated blood pressure today  /83 with repeat BP of 160/90   Currently asymptomatic    -patient currently on Lisinopril-HCTZ 20-25mg daily and clonidine 0 1mg QHS  -discussed keeping BP log for next 2 weeks   -f/u in 2 weeks to evaluate BP log and consider increasing medication at this time

## 2022-02-07 NOTE — PATIENT INSTRUCTIONS

## 2022-02-07 NOTE — PROGRESS NOTES
[address] [facility] [place]    NAME: Zena Thomas  AGE: 61 y o  SEX: female  : 1965     DATE: 2022     Assessment and Plan:     Problem List Items Addressed This Visit        Endocrine    Type 2 diabetes mellitus ([address] )       Lab Results   Component Value Date    HGBA1C 7 5 (A) 2022     - Stable  - HgbA1c 7 5 in office today vs 7 1 on last exam  -patient on Metformin XR 500mg BID  Endorses to compliance   -discussed dietary modifications at this time  -patient to have diabetic eye exam in office today, declined foot exam at this time   -f/u for follow up of BG and HgbA1c in 3 months and can consider increasing Metformin if HgbA1c continues to increase         Relevant Orders    Microalbumin / creatinine urine ratio    POCT hemoglobin A1c (Completed)       Cardiovascular and Mediastinum    Hypertension     Patient with elevated blood pressure today  /83 with repeat BP of 160/90  Currently asymptomatic    -patient currently on Lisinopril-HCTZ 20-25mg daily and clonidine 0 1mg QHS  -discussed keeping BP log for next 2 weeks   -f/u in 2 weeks to evaluate BP log and consider increasing medication at this time             Other    Dyspepsia    Relevant Medications    pantoprazole (PROTONIX) 40 mg tablet    Annual physical exam - Primary     - Patient presents for annual physical exam   - Screening for cardiovascular disease: yearly CMP and Lipid panel ordered today  - Patient up to date on Screening for colorectal and cervical cancer    - Screening for breast cancer: Last mammogram in   Negative for malignancy  - Counseled the patient on healthy lifestyle habits         Relevant Orders    Lipid panel    Comprehensive metabolic panel          Immunizations and preventive care screenings were discussed with patient today  Patient declining COVID and flu vaccines today due to Anabaptist reasons   Appropriate education was printed on patient's after visit summary  Counseling:  Alcohol/drug use: discussed moderation in alcohol intake, the recommendations for healthy alcohol use, and avoidance of illicit drug use  Dental Health: discussed importance of regular tooth brushing, flossing, and dental visits  Injury prevention: discussed safety/seat belts, safety helmets, smoke detectors, carbon dioxide detectors, and smoking near bedding or upholstery  Sexual health: discussed sexually transmitted diseases, partner selection, use of condoms, avoidance of unintended pregnancy, and contraceptive alternatives  · Exercise: the importance of regular exercise/physical activity was discussed  Recommend exercise 3-5 times per week for at least 30 minutes  BMI Counseling: Body mass index is 36 58 kg/m²  The BMI is above normal  Nutrition recommendations include decreasing portion sizes, encouraging healthy choices of fruits and vegetables and moderation in carbohydrate intake  Exercise recommendations include exercising 3-5 times per week  Rationale for BMI follow-up plan is due to patient being overweight or obese  Depression Screening and Follow-up Plan: Patient was screened for depression during today's encounter  They screened negative with a PHQ-2 score of 0  Return in about 2 weeks (around 2/21/2022) for F/U Blood pressure   Chief Complaint:     Chief Complaint   Patient presents with    Annual Exam     No complaint today      History of Present Illness:     Adult Annual Physical   Patient here for a comprehensive physical exam  The patient reports no problems  Patient had COVID infection x2 most recently in December of 2021  Has some recurrent headaches since then  Taking Tylenol with good relief of symptoms  Diet and Physical Activity  · Diet/Nutrition: well balanced diet, limited junk food and limited fruits/vegetables  · Exercise: no formal exercise        Depression Screening  PHQ-2/9 Depression Screening    Little interest or pleasure in doing things: 0 - not at all  Feeling down, depressed, or hopeless: 0 - not at all  Trouble falling or staying asleep, or sleeping too much: 0 - not at all  Feeling tired or having little energy: 0 - not at all  Poor appetite or overeatin - not at all  Feeling bad about yourself - or that you are a failure or have let yourself or your family down: 0 - not at all  Trouble concentrating on things, such as reading the newspaper or watching television: 0 - not at all  Moving or speaking so slowly that other people could have noticed  Or the opposite - being so fidgety or restless that you have been moving around a lot more than usual: 0 - not at all  Thoughts that you would be better off dead, or of hurting yourself in some way: 0 - not at all  PHQ-2 Score: 0  PHQ-2 Interpretation: Negative depression screen  PHQ-9 Score: 0   PHQ-9 Interpretation: No or Minimal depression        General Health  · Sleep: sleeps well, gets 4-6 hours of sleep on average and mild KIM diagnosed on sleep study no CPAP at this time   · Hearing: normal - bilateral   · Vision: no vision problems, most recent eye exam >1 year ago and wears glasses  · Dental: regular dental visits, brushes teeth twice daily and flosses teeth occasionally  /GYN Health  · Patient is: postmenopausal  · Last menstrual period: D&C performed in 2021 by Dr Chon Nava, LMP 2021   · Contraceptive method: none s/p D&C  Review of Systems:     Review of Systems   Constitutional: Negative for appetite change, chills and fever  HENT: Positive for tinnitus (chronic, intermittent )  Negative for congestion, ear pain, nosebleeds, rhinorrhea and sore throat  Respiratory: Negative for chest tightness and shortness of breath  Cardiovascular: Negative for chest pain and palpitations  Gastrointestinal: Negative for abdominal pain, constipation, diarrhea, nausea and vomiting     Genitourinary: Negative for dysuria and hematuria  Musculoskeletal: Negative for arthralgias, joint swelling and myalgias  Neurological: Negative for dizziness, light-headedness and headaches  Past Medical History:     Past Medical History:   Diagnosis Date    Asthma     Asthmatic bronchitis with exacerbation     Last Assessed: 8/7/2014    Diabetes mellitus ([facility] [address] )     GERD (gastroesophageal reflux disease)     History of COVID-19 10/27/21    Mild Symptoms- Fever,Chills and Bodyaches    Hyperlipidemia     Hypertension     Impaired fasting glucose     Last Assessed: 1/15/2015    Pneumonia     Sleep apnea     Mild      Past Surgical History:     Past Surgical History:   Procedure Laterality Date    CERVICAL CERCLAGE      COLONOSCOPY      HAND SURGERY Right     Wrist mass E/O    KS ANTERIOR COLPORRAPHY RPR CYSTOCELE W/CYSTO N/A 6/15/2020    Procedure: ANTERIOR AND POSTERIOR COLPORRHAPHY;  Surgeon: Xi Correa MD;  [facility];  Service: UroGynecology           KS CYSTOURETHROSCOPY N/A 6/15/2020    Procedure: Karen Hansen;  Surgeon: Xi Correa MD;  [facility];  Service: UroGynecology           KS HYSTEROSCOPY,W/ENDO BX N/A 6/18/2021    Procedure: DILATATION AND CURETTAGE (D&C) WITH HYSTEROSCOPY;  Surgeon: Maximo Kiran MD;  [facility];  Service: Gynecology    KS Mellody Bombard LIG N/A 6/15/2020    Procedure: COLPOSUSPENSION VAGINAL EXTRAPERITONEAL(ENPLACE);   Surgeon: Xi Correa MD;  [facility];  Service: UroGynecology           KS SLING OPER STRES INCONTINENCE N/A 6/15/2020    Procedure: PUBOVAGINAL SLING;  Surgeon: Xi Correa MD;  [facility];  Service: UroGynecology           THYROIDECTOMY, PARTIAL      TUBAL LIGATION        Social History:     Social History     Socioeconomic History    Marital status: /Civil Union     Spouse name: None    Number of children: None    Years of education: None    Highest education level: None   Occupational History    None   Tobacco Use    Smoking status: Never Smoker    Smokeless tobacco: Never Used   Vaping Use    Vaping Use: Never used   Substance and Sexual Activity    Alcohol use: No    Drug use: No    Sexual activity: Yes     Partners: Male     Birth control/protection: Female Sterilization   Other Topics Concern    None   Social History Narrative    Uses safety equipment - seatbelts     Social Determinants of Health     Financial Resource Strain: Low Risk     Difficulty of Paying Living Expenses: Not hard at all   Food Insecurity: No Food Insecurity    Worried About Running Out of Food in the Last Year: Never true    Che of Food in the Last Year: Never true   Transportation Needs: No Transportation Needs    Lack of Transportation (Medical): No    Lack of Transportation (Non-Medical): No   Physical Activity: Inactive    Days of Exercise per Week: 0 days    Minutes of Exercise per Session: 0 min   Stress: No Stress Concern Present    Feeling of Stress : Not at all   Social Connections:  Moderately Integrated    Frequency of Communication with Friends and Family: More than three times a week    Frequency of Social Gatherings with Friends and Family: More than three times a week    Attends Judaism Services: More than 4 times per year    Active Member of [employer] or Organizations: No    Attends Club or Organization Meetings: Never    Marital Status:    Intimate Partner Violence: Not At Risk    Fear of Current or Ex-Partner: No    Emotionally Abused: No    Physically Abused: No    Sexually Abused: No   Housing Stability: Low Risk     Unable to Pay for Housing in the Last Year: No    Number of [place] in the Last Year: 1    Unstable Housing in the Last Year: No      Family History:     Family History   Problem Relation Age of Onset    Coronary artery disease Mother     Diabetes Father     Stroke Father         Ischemic    Diabetes Brother     No Known Problems Sister     No Known Problems Daughter     No Known Problems Maternal Grandmother     No Known Problems Maternal Grandfather     No Known Problems Paternal Grandmother     No Known Problems Paternal Grandfather     No Known Problems Son     No Known Problems Maternal Aunt     No Known Problems Maternal Aunt     No Known Problems Maternal Aunt     Cancer Paternal Aunt         unknown type    No Known Problems Paternal Aunt     No Known Problems Paternal Aunt     No Known Problems Paternal Aunt     No Known Problems Paternal Aunt     No Known Problems Paternal Aunt     No Known Problems Paternal Aunt     No Known Problems Paternal Aunt     Cancer Paternal Uncle         unknown type    Cancer Paternal Uncle         unknown type    Breast cancer Cousin         unknown age      Current Medications:     Current Outpatient Medications   Medication Sig Dispense Refill    acetaminophen (TYLENOL) 650 mg CR tablet Take 1 tablet (650 mg total) by mouth every 8 (eight) hours as needed for mild pain 30 tablet 0    albuterol (Ventolin HFA) 90 mcg/act inhaler Inhale 2 puffs every 4 (four) hours as needed for wheezing or shortness of breath 8 g 0    Ascorbic Acid (VITAMIN C PO) Take 1 tablet by mouth daily      cloNIDine (CATAPRES) 0 1 mg tablet take 1 tablet by mouth every 12 hours (Patient taking differently: Take 0 1 mg by mouth daily at bedtime ) 180 tablet 3    Dilt- MG 24 hr capsule take 1 capsule by mouth every morning 30 capsule 3    fluticasone (FLONASE) 50 mcg/act nasal spray 1 spray into each nostril daily 11 1 mL 1    ibuprofen (MOTRIN) 600 mg tablet Take 1 tablet (600 mg total) by mouth every 6 (six) hours as needed for mild pain (Patient taking differently: Take 600 mg by mouth every 6 (six) hours as needed for mild pain PRN) 30 tablet 0    lisinopril-hydrochlorothiazide (PRINZIDE,ZESTORETIC) 20-25 MG per tablet take 1 tablet by mouth once daily 90 tablet 0    metFORMIN (GLUCOPHAGE-XR) 500 mg 24 hr tablet Take 2 tablets (1,000 mg total) by mouth 2 (two) times a day with meals Takes 2 500 mg Tablets = 1000 mg Twice/day 360 tablet 0    pantoprazole (PROTONIX) 40 mg tablet Take 1 tablet (40 mg total) by mouth daily 90 tablet 1    RA VITAMIN D-3 25 MCG (1000 UT) tablet take 1 tablet by mouth once daily 90 tablet 0     No current facility-administered medications for this visit  Allergies: Allergies   Allergen Reactions    Levaquin [Levofloxacin] Rash      Physical Exam:     /83 (BP Location: Left arm, Patient Position: Sitting, Cuff Size: Standard)   Pulse 75   Temp 98 °F (36 7 °C) (Temporal)   Resp 18   Ht 5' 2" (1 575 m)   Wt 90 7 kg (200 lb)   LMP 12/12/2019 (Exact Date)   SpO2 99%   BMI 36 58 kg/m²     Physical Exam  Vitals reviewed  Constitutional:       General: She is not in acute distress  Appearance: Normal appearance  She is obese  She is not ill-appearing, toxic-appearing or diaphoretic  HENT:      Head: Normocephalic and atraumatic  Eyes:      Conjunctiva/sclera: Conjunctivae normal    Cardiovascular:      Rate and Rhythm: Normal rate and regular rhythm  Pulses: Normal pulses  Heart sounds: Normal heart sounds  Pulmonary:      Effort: Pulmonary effort is normal       Breath sounds: Normal breath sounds  Abdominal:      General: Abdomen is flat  Bowel sounds are normal  There is no distension  Palpations: Abdomen is soft  Tenderness: There is no abdominal tenderness  There is no guarding  Musculoskeletal:      Right lower leg: No edema  Left lower leg: No edema  Skin:     General: Skin is warm and dry  Neurological:      General: No focal deficit present  Mental Status: She is alert and oriented to person, place, and time  Mental status is at baseline  Psychiatric:         Mood and Affect: Mood normal          Behavior: Behavior normal          Thought Content:  Thought content normal          Judgment: Judgment normal           Jenifer Wetzel MD  [id number]

## 2022-02-07 NOTE — ASSESSMENT & PLAN NOTE
Lab Results   Component Value Date    HGBA1C 7 5 (A) 02/07/2022     - Stable  - HgbA1c 7 5 in office today vs 7 1 on last exam  -patient on Metformin XR 500mg BID   Endorses to compliance   -discussed dietary modifications at this time  -patient to have diabetic eye exam in office today, declined foot exam at this time   -f/u for follow up of BG and HgbA1c in 3 months and can consider increasing Metformin if HgbA1c continues to increase

## 2022-02-15 ENCOUNTER — APPOINTMENT (OUTPATIENT)
Dept: LAB | Facility: CLINIC | Age: 57
End: 2022-02-15
Payer: COMMERCIAL

## 2022-02-15 DIAGNOSIS — I10 ESSENTIAL HYPERTENSION: ICD-10-CM

## 2022-02-15 DIAGNOSIS — Z00.00 ANNUAL PHYSICAL EXAM: ICD-10-CM

## 2022-02-15 DIAGNOSIS — E11.9 TYPE 2 DIABETES MELLITUS WITHOUT COMPLICATION, WITHOUT LONG-TERM CURRENT USE OF INSULIN (HCC): ICD-10-CM

## 2022-02-15 LAB
ALBUMIN SERPL BCP-MCNC: 3.7 G/DL (ref 3.5–5)
ALP SERPL-CCNC: 62 U/L (ref 46–116)
ALT SERPL W P-5'-P-CCNC: 41 U/L (ref 12–78)
ANION GAP SERPL CALCULATED.3IONS-SCNC: 10 MMOL/L (ref 4–13)
AST SERPL W P-5'-P-CCNC: 29 U/L (ref 5–45)
BILIRUB SERPL-MCNC: 0.2 MG/DL (ref 0.2–1)
BUN SERPL-MCNC: 18 MG/DL (ref 5–25)
CALCIUM SERPL-MCNC: 9.2 MG/DL (ref 8.3–10.1)
CHLORIDE SERPL-SCNC: 103 MMOL/L (ref 100–108)
CHOLEST SERPL-MCNC: 237 MG/DL
CO2 SERPL-SCNC: 28 MMOL/L (ref 21–32)
CREAT SERPL-MCNC: 0.78 MG/DL (ref 0.6–1.3)
CREAT UR-MCNC: 228 MG/DL
GFR SERPL CREATININE-BSD FRML MDRD: 84 ML/MIN/1.73SQ M
GLUCOSE P FAST SERPL-MCNC: 143 MG/DL (ref 65–99)
HDLC SERPL-MCNC: 71 MG/DL
LDLC SERPL CALC-MCNC: 133 MG/DL (ref 0–100)
MICROALBUMIN UR-MCNC: 22.5 MG/L (ref 0–20)
MICROALBUMIN/CREAT 24H UR: 10 MG/G CREATININE (ref 0–30)
NONHDLC SERPL-MCNC: 166 MG/DL
POTASSIUM SERPL-SCNC: 4.2 MMOL/L (ref 3.5–5.3)
PROT SERPL-MCNC: 7.3 G/DL (ref 6.4–8.2)
SODIUM SERPL-SCNC: 141 MMOL/L (ref 136–145)
TRIGL SERPL-MCNC: 165 MG/DL

## 2022-02-15 PROCEDURE — 82043 UR ALBUMIN QUANTITATIVE: CPT | Performed by: FAMILY MEDICINE

## 2022-02-15 PROCEDURE — 80053 COMPREHEN METABOLIC PANEL: CPT

## 2022-02-15 PROCEDURE — 82570 ASSAY OF URINE CREATININE: CPT | Performed by: FAMILY MEDICINE

## 2022-02-15 PROCEDURE — 36415 COLL VENOUS BLD VENIPUNCTURE: CPT

## 2022-02-15 PROCEDURE — 3061F NEG MICROALBUMINURIA REV: CPT | Performed by: FAMILY MEDICINE

## 2022-02-15 PROCEDURE — 80061 LIPID PANEL: CPT

## 2022-02-28 ENCOUNTER — OFFICE VISIT (OUTPATIENT)
Dept: FAMILY MEDICINE CLINIC | Facility: CLINIC | Age: 57
End: 2022-02-28

## 2022-02-28 VITALS
SYSTOLIC BLOOD PRESSURE: 167 MMHG | OXYGEN SATURATION: 98 % | TEMPERATURE: 97.9 F | HEIGHT: 62 IN | WEIGHT: 202 LBS | RESPIRATION RATE: 18 BRPM | DIASTOLIC BLOOD PRESSURE: 99 MMHG | HEART RATE: 68 BPM | BODY MASS INDEX: 37.17 KG/M2

## 2022-02-28 DIAGNOSIS — E78.5 HYPERLIPIDEMIA, UNSPECIFIED HYPERLIPIDEMIA TYPE: ICD-10-CM

## 2022-02-28 DIAGNOSIS — I10 ESSENTIAL HYPERTENSION: Primary | ICD-10-CM

## 2022-02-28 PROCEDURE — 3008F BODY MASS INDEX DOCD: CPT | Performed by: FAMILY MEDICINE

## 2022-02-28 PROCEDURE — 4010F ACE/ARB THERAPY RXD/TAKEN: CPT | Performed by: FAMILY MEDICINE

## 2022-02-28 PROCEDURE — 99213 OFFICE O/P EST LOW 20 MIN: CPT | Performed by: FAMILY MEDICINE

## 2022-02-28 RX ORDER — LISINOPRIL 20 MG/1
20 TABLET ORAL DAILY
Qty: 30 TABLET | Refills: 0 | Status: SHIPPED | OUTPATIENT
Start: 2022-02-28

## 2022-02-28 RX ORDER — ATORVASTATIN CALCIUM 20 MG/1
20 TABLET, FILM COATED ORAL DAILY
Qty: 30 TABLET | Refills: 3 | Status: SHIPPED | OUTPATIENT
Start: 2022-02-28

## 2022-02-28 NOTE — PROGRESS NOTES
Assessment/Plan:    Essential hypertension  Uncontrolled  on medications  Today BP is 167/99  Blood pressure log reviewed  home systolic BPs range from 155G to 170s  Currently asymptomatic  Pt endorses compliance with medication regimen  Plan:  · Currently on Lisinopril-HCTZ 20-25mg daily and clonidine 0 1mg QHS; sent script for additional lisinopril 20 mg daily  Stressed the importance of never missing a dose of clonidine due to the risk of worsened BP elevation  · Continue to keep BP log for next 2 weeks   · F/u in 2 weeks to evaluate BP log     Hyperlipidemia  Elevated BMI  Lipid panel on 2/15/22 is abnormal (total cholesterol - 237, TGs - 165, LDL - 133)  Previously on Lipitor 20 mg >1 year ago but discontinued due to improved ASCVD risk  ASCVD risk today 9 9%  Plan:  · Restart Lipitor 20 mg  · Diet and exercise discussed       Diagnoses and all orders for this visit:    Essential hypertension  -     lisinopril (ZESTRIL) 20 mg tablet; Take 1 tablet (20 mg total) by mouth daily    Hyperlipidemia, unspecified hyperlipidemia type  -     atorvastatin (LIPITOR) 20 mg tablet; Take 1 tablet (20 mg total) by mouth daily          Subjective:      Patient ID: Carlos Pizarro is a 61 y o  female  Pt presents for 2-week follow-up for HTN  Pt endorses compliance with medication regimen  She denies vision changes, lightheadedness, headaches, chest pain, and SOB  Pt brought BP log with her, which showed systolic BPs ranging from 140s to 170s SBP  Patient otherwise denied any other acute complains or concerns  The following portions of the patient's history were reviewed and updated as appropriate: allergies, current medications, past family history, past medical history, past social history, past surgical history and problem list     Review of Systems   Constitutional: Negative for activity change, appetite change, chills, fatigue, fever and unexpected weight change     HENT: Negative for rhinorrhea, sore throat and trouble swallowing  Eyes: Negative for visual disturbance  Respiratory: Negative for cough and shortness of breath  Cardiovascular: Negative for chest pain, palpitations and leg swelling  Gastrointestinal: Negative for abdominal distention, abdominal pain, constipation, diarrhea, nausea and vomiting  Genitourinary: Negative for difficulty urinating  Musculoskeletal: Negative for arthralgias and myalgias  Skin: Negative for color change  Neurological: Negative for dizziness, weakness, light-headedness and headaches  Psychiatric/Behavioral: Negative for suicidal ideas  Objective:      /99 (BP Location: Left arm, Patient Position: Sitting, Cuff Size: Large)   Pulse 68   Temp 97 9 °F (36 6 °C) (Temporal)   Resp 18   Ht 5' 2" (1 575 m)   Wt 91 6 kg (202 lb)   LMP 12/12/2019 (Exact Date)   SpO2 98%   BMI 36 95 kg/m²          Physical Exam  Vitals reviewed  Constitutional:       General: She is not in acute distress  Appearance: Normal appearance  She is not ill-appearing or toxic-appearing  HENT:      Head: Normocephalic  Mouth/Throat:      Mouth: Mucous membranes are moist       Pharynx: Oropharynx is clear  Eyes:      General: No scleral icterus  Extraocular Movements: Extraocular movements intact  Conjunctiva/sclera: Conjunctivae normal       Pupils: Pupils are equal, round, and reactive to light  Cardiovascular:      Rate and Rhythm: Normal rate and regular rhythm  Pulses: Normal pulses  Heart sounds: Normal heart sounds  Pulmonary:      Effort: Pulmonary effort is normal  No respiratory distress  Breath sounds: Normal breath sounds  Abdominal:      General: Bowel sounds are normal  There is no distension  Palpations: Abdomen is soft  Tenderness: There is no abdominal tenderness  Musculoskeletal:         General: No swelling, tenderness or deformity  Cervical back: Neck supple  No tenderness  Skin:     General: Skin is warm and dry  Coloration: Skin is not jaundiced  Neurological:      General: No focal deficit present  Mental Status: She is alert and oriented to person, place, and time  Motor: No weakness  Psychiatric:         Mood and Affect: Mood normal          Behavior: Behavior normal          Thought Content:  Thought content normal          Judgment: Judgment normal

## 2022-02-28 NOTE — ASSESSMENT & PLAN NOTE
Uncontrolled  on medications  Today BP is 167/99  Blood pressure log reviewed  home systolic BPs range from 939T to 170s  Currently asymptomatic  Pt endorses compliance with medication regimen  Plan:  · Currently on Lisinopril-HCTZ 20-25mg daily and clonidine 0 1mg QHS; sent script for additional lisinopril 20 mg daily  Stressed the importance of never missing a dose of clonidine due to the risk of worsened BP elevation    · Continue to keep BP log for next 2 weeks   · F/u in 2 weeks to evaluate BP log

## 2022-02-28 NOTE — PATIENT INSTRUCTIONS
DASH Eating Plan   WHAT YOU NEED TO KNOW:   The DASH (Dietary Approaches to Stop Hypertension) Eating Plan is designed to help prevent or lower high blood pressure  It can also help to lower LDL (bad) cholesterol and decrease your risk for heart disease  The plan is low in sodium, sugar, unhealthy fats, and total fat  It is high in potassium, calcium, magnesium, and fiber  These nutrients are added when you eat more fruits, vegetables, and whole grains  With the DASH eating plan, you need to eat a certain number of servings from each food group  This will help you get enough of certain nutrients and limit others  The amount of servings you should eat depends on how many calories you need  Your dietitian can      DISCHARGE INSTRUCTIONS:   What you need to know about sodium:  Your dietitian will tell you how much sodium is safe for you to have each day  People with high blood pressure should have no more than 1,500 to 2,300 mg of sodium in a day  A teaspoon (tsp) of salt has 2,300 mg of sodium  This may seem like a difficult goal, but small changes to the foods you eat can make a big difference  Your healthcare provider or dietitian can help you create a meal plan that follows your sodium limit  · Read food labels  Food labels can help you choose foods that are low in sodium  The amount of sodium is listed in milligrams (mg)  The % Daily Value (DV) column tells you how much of your daily needs are met by 1 serving of the food for each nutrient listed  Choose foods that have less than 5% of the DV of sodium  These foods are considered low in sodium  Foods that have 20% or more of the DV of sodium are considered high in sodium  Avoid foods that have more than 300 mg of sodium in each serving  Choose foods that say low-sodium, reduced-sodium, or no salt added on the food label  · Limit added salt  Do not salt food at the table if you add salt when you cook   Use herbs and spices, such as onions, garlic, and salt-free seasonings to add flavor  Try lemon or lime juice or vinegar to add a tart flavor  Use hot peppers or a small amount of hot pepper sauce to add a spicy flavor  Limit foods high in added salt, such as the following:    ? Seasonings made with salt, such as garlic salt, celery salt, onion salt, seasoned salt, meat tenderizers, and monosodium glutamate (MSG)    ? Miso soup and canned or dried soup mixes    ? Regular soy sauce, barbecue sauce, teriyaki sauce, steak sauce, Worcestershire sauce, and most flavored vinegars    ? Snack foods, such as salted chips, popcorn, pretzels, pork rinds, salted crackers, and salted nuts    ? Frozen foods, such as dinners, entrees, vegetables with sauces, and breaded meats    · Ask about salt substitutes  Ask your healthcare provider if you may use salt substitutes  Some salt substitutes have ingredients that can be harmful if you have certain health conditions  · Choose foods carefully at restaurants  Meals from restaurants, especially fast food restaurants, are often high in sodium  Some restaurants have nutrition information that tells you the amount of sodium in their foods  Ask to have your food prepared with less, or no salt  What you need to know about fats:  Healthy fats include unsaturated fats and omega-3 fatty acids  Unhealthy fats include saturated fats and trans fats  · Include healthy fats, such as the following:      ? Cooking oils, such as soybean, canola, olive, or sunflower    ? Fatty fish, such as salmon, tuna, mackerel, or sardines    ? Flaxseed oil or ground flaxseed    ? ½ cup of cooked beans, such as black beans, kidney beans, or lui beans    ? 1½ ounces of low-sodium nuts, such as almonds or walnuts    ? Low-sugar, low-sodium peanut butter    ? Seeds such as zina seeds or sunflower seeds       · Limit or do not have unhealthy fats, such as the following:      ?  Foods that contain fat from animals, such as fatty meats, whole milk, butter, and cream    ? Shortening, stick margarine, palm oil, and coconut oil    ? Full-fat or creamy salad dressing    ? Creamy soup    ? Crackers, chips, and baked goods made with margarine or shortening    ? Foods that are fried in unhealthy fats    ? Gravy and sauces, such as Baljinder or cheese sauces    What you need to know about carbohydrates (carbs): All carbs break down into sugar  Complex carbs contain more fiber than simple carbs  This means complex carbs go into the bloodstream more slowly and cause less of a blood sugar spike  Try to include more complex carbs and fewer simple carbs  · Include complex carbs, such as the following:      ? 1 slice of whole-grain bread    ? 1 ounce of dry cereal that does not contain added sugar    ? ½ cup of cooked oatmeal    ? 2 ounces of cooked whole-grain pasta    ? ½ cup of cooked brown rice    · Limit or do not have simple carbs, such as the following:      ? Baked goods, such as doughnuts, pastries, and cookies    ? Mixes for cornbread and biscuits    ? White rice and pasta mixes, such as boxed macaroni and cheese    ? Instant and cold cereals that contain sugar    ? Jelly, jam, and ice cream that contain sugar    ? Condiments such as ketchup    ? Drinks high in sugar, such as soft drinks, lemonade, and fruit juice    What you need to know about vegetables and fruits:  Vegetables and fruits can be fresh, frozen, or canned  If possible, try to choose low-sodium canned options  · Include a variety of vegetables and fruits, such as the following:      ? 1 medium apple, pear, or peach (about ½ cup chopped)    ? ½ small banana    ? ½ cup berries, such as blueberries, strawberries, or blackberries    ? 1 cup of raw leafy greens, such as lettuce, spinach, kale, or lynnette greens    ? ½ cup of frozen or canned (no added salt) vegetables, such as green beans    ? ½ cup of fresh, frozen, or canned fruit (canned in light syrup or fruit juice)    ?  ½ cup of vegetable or fruit juice    · Limit or do not have vegetables and fruits made in the following ways:      ? Frozen fruit such as cherries that have added sugar    ? Fruit in cream or butter sauce    ? Canned vegetables that are high in sodium    ? Sauerkraut, pickled vegetables, and other foods prepared in brine    ? Fried vegetables or vegetables in butter or high-fat sauces    What you need to know about protein foods:   · Include lean or low-fat protein foods, such as the following:      ? Poultry (chicken, turkey) with no skin    ? Fish (especially fatty fish, such as salmon, fresh tuna, or mackerel)    ? Lean beef and pork (loin, round, extra lean hamburger)    ? Egg whites and egg substitutes    ? 1 cup of nonfat (skim) or 1% milk    ? 1½ ounces of fat-free or low-fat cheese    ? 6 ounces of nonfat or low-fat yogurt    · Limit or do not have high-fat protein foods, such as the following:      ? Smoked or cured meat, such as corned beef, rhodes, ham, hot dogs, and sausage    ? Canned beans and canned meats or spreads, such as potted meats, sardines, anchovies, and imitation seafood    ? Deli or lunch meats, such as bologna, ham, turkey, and roast beef    ? High-fat meat (T-bone steak, regular hamburger, and ribs)    ? Whole eggs and egg yolks    ? Whole milk, 2% milk, and cream    ? Regular cheese and processed cheese    Other guidelines to follow:   · Maintain a healthy weight  Your risk for heart disease is higher if you are overweight  Your healthcare provider may suggest that you lose weight if you are overweight  You can lose weight by eating fewer calories and foods that have added sugars and fat  The DASH meal plan can help you do this  Decrease calories by eating smaller portions at each meal and fewer snacks  Ask your healthcare provider for more information about how to lose weight  · Exercise regularly  Regular exercise can help you reach or maintain a healthy weight   Regular exercise can also help decrease your blood pressure and improve your cholesterol levels  Get 30 minutes or more of moderate exercise each day of the week  To lose weight, get at least 60 minutes of exercise  Talk to your healthcare provider about the best exercise program for you  · Limit alcohol  Women should limit alcohol to 1 drink a day  Men should limit alcohol to 2 drinks a day  A drink of alcohol is 12 ounces of beer, 5 ounces of wine, or 1½ ounces of liquor  For more information:   · [facility] and [address]  [address] [address]  [place] [address]  Phone: 4- 150 - [phone]  Web Address: [facility] no    © [facility] 2022 Information is for End User's use only and may not be sold, redistributed or otherwise used for commercial purposes  All illustrations and images included in CareNotes® are the copyrighted property of [employer]  or [address] [place], [place] [address]   The above information is an  only  It is not intended as medical advice for individual conditions or treatments  Talk to your doctor, nurse or pharmacist before following any medical regimen to see if it is safe and effective for you

## 2022-03-01 DIAGNOSIS — I10 ESSENTIAL HYPERTENSION: ICD-10-CM

## 2022-03-01 RX ORDER — LISINOPRIL AND HYDROCHLOROTHIAZIDE 25; 20 MG/1; MG/1
TABLET ORAL
Qty: 90 TABLET | Refills: 0 | Status: SHIPPED | OUTPATIENT
Start: 2022-03-01 | End: 2022-06-01

## 2022-03-10 NOTE — TELEPHONE ENCOUNTER
Received voicemail from patient regarding prescription refill request for lisinopril-hydrochlorothiazide  Call placed to patient for clarification  Per patient, she was unaware that her medication was ready at the pharmacy  Patient stated she will call the pharmacy to make sure medication has been sent there  Patient was advised to call back with any further questions or concerns  Patient verbalized understanding

## 2022-04-26 ENCOUNTER — OFFICE VISIT (OUTPATIENT)
Dept: URGENT CARE | Age: 57
End: 2022-04-26
Payer: COMMERCIAL

## 2022-04-26 VITALS
HEIGHT: 62 IN | TEMPERATURE: 97.9 F | HEART RATE: 98 BPM | WEIGHT: 200 LBS | OXYGEN SATURATION: 98 % | RESPIRATION RATE: 16 BRPM | BODY MASS INDEX: 36.8 KG/M2

## 2022-04-26 DIAGNOSIS — R05.1 ACUTE COUGH: Primary | ICD-10-CM

## 2022-04-26 PROCEDURE — 99213 OFFICE O/P EST LOW 20 MIN: CPT | Performed by: STUDENT IN AN ORGANIZED HEALTH CARE EDUCATION/TRAINING PROGRAM

## 2022-04-26 PROCEDURE — 87636 SARSCOV2 & INF A&B AMP PRB: CPT | Performed by: STUDENT IN AN ORGANIZED HEALTH CARE EDUCATION/TRAINING PROGRAM

## 2022-04-26 RX ORDER — BENZONATATE 200 MG/1
200 CAPSULE ORAL 3 TIMES DAILY PRN
Qty: 20 CAPSULE | Refills: 0 | Status: SHIPPED | OUTPATIENT
Start: 2022-04-26

## 2022-04-26 RX ORDER — PREDNISONE 10 MG/1
10 TABLET ORAL DAILY
Qty: 21 TABLET | Refills: 0 | Status: SHIPPED | OUTPATIENT
Start: 2022-04-26

## 2022-04-26 RX ORDER — AZITHROMYCIN 250 MG/1
TABLET, FILM COATED ORAL
Qty: 6 TABLET | Refills: 0 | Status: SHIPPED | OUTPATIENT
Start: 2022-04-26 | End: 2022-04-30

## 2022-04-27 LAB
FLUAV RNA RESP QL NAA+PROBE: NEGATIVE
FLUBV RNA RESP QL NAA+PROBE: NEGATIVE
SARS-COV-2 RNA RESP QL NAA+PROBE: NEGATIVE

## 2022-04-27 NOTE — PROGRESS NOTES
[id number]        NAME: Michael Sim is a 61 y o  female  : 1965    MRN: [id number]  DATE: 2022  TIME: 8:47 PM    Assessment and Plan   Acute cough [R05 1]  1  Acute cough  benzonatate (TESSALON) 200 MG capsule    azithromycin (ZITHROMAX) 250 mg tablet    predniSONE 10 mg tablet    Cov/Flu-Collected at [facility]         Patient Instructions       Follow up with PCP in 3-5 days  Proceed to  ER if symptoms worsen  Chief Complaint     Chief Complaint   Patient presents with    Cough     since yesterday    Chills    Wheezing         History of Present Illness       HPI   Patient presents today complaining of a cough since yesterday, chills and wheezing as well  Patient has had all COVID vaccines, states she had a negative home COVID test   She does not note any sick contacts  She does not feel short of breath but has some upper respiratory wheezing      Review of Systems   Review of Systems  Per hpi     Current Medications       Current Outpatient Medications:     albuterol (Ventolin HFA) 90 mcg/act inhaler, Inhale 2 puffs every 4 (four) hours as needed for wheezing or shortness of breath, Disp: 8 g, Rfl: 0    atorvastatin (LIPITOR) 20 mg tablet, Take 1 tablet (20 mg total) by mouth daily, Disp: 30 tablet, Rfl: 3    Bioflavonoid Products (VITAMIN C PLUS PO), Take by mouth, Disp: , Rfl:     cloNIDine (CATAPRES) 0 1 mg tablet, take 1 tablet by mouth every 12 hours (Patient taking differently: Take 0 1 mg by mouth daily at bedtime ), Disp: 180 tablet, Rfl: 3    Dilt- MG 24 hr capsule, take 1 capsule by mouth every morning, Disp: 30 capsule, Rfl: 3    fluticasone (FLONASE) 50 mcg/act nasal spray, 1 spray into each nostril daily, Disp: 11 1 mL, Rfl: 1    lisinopril (ZESTRIL) 20 mg tablet, Take 1 tablet (20 mg total) by mouth daily, Disp: 30 tablet, Rfl: 0    lisinopril-hydrochlorothiazide (PRINZIDE,ZESTORETIC) 20-25 MG per tablet, take 1 tablet by mouth once daily, Disp: 90 tablet, Rfl: 0    metFORMIN (GLUCOPHAGE-XR) 500 mg 24 hr tablet, Take 2 tablets (1,000 mg total) by mouth 2 (two) times a day with meals Takes 2 500 mg Tablets = 1000 mg Twice/day, Disp: 360 tablet, Rfl: 0    pantoprazole (PROTONIX) 40 mg tablet, Take 1 tablet (40 mg total) by mouth daily, Disp: 90 tablet, Rfl: 1    RA VITAMIN D-3 25 MCG (1000 UT) tablet, take 1 tablet by mouth once daily, Disp: 90 tablet, Rfl: 0    acetaminophen (TYLENOL) 650 mg CR tablet, Take 1 tablet (650 mg total) by mouth every 8 (eight) hours as needed for mild pain, Disp: 30 tablet, Rfl: 0    Ascorbic Acid (VITAMIN C PO), Take 1 tablet by mouth daily, Disp: , Rfl:     azithromycin (ZITHROMAX) 250 mg tablet, Take 2 tablets today then 1 tablet daily x 4 days, Disp: 6 tablet, Rfl: 0    benzonatate (TESSALON) 200 MG capsule, Take 1 capsule (200 mg total) by mouth 3 (three) times a day as needed for cough, Disp: 20 capsule, Rfl: 0    ibuprofen (MOTRIN) 600 mg tablet, Take 1 tablet (600 mg total) by mouth every 6 (six) hours as needed for mild pain (Patient taking differently: Take 600 mg by mouth every 6 (six) hours as needed for mild pain PRN), Disp: 30 tablet, Rfl: 0    predniSONE 10 mg tablet, Take 1 tablet (10 mg total) by mouth daily 6 tab day 1, 5 tab day 2, 4 tab day 3, 3 tab day 4, 2 tab day 5, 1 tab day 6, Disp: 21 tablet, Rfl: 0    Current Allergies     Allergies as of 04/26/2022 - Reviewed 04/26/2022   Allergen Reaction Noted    Levaquin [levofloxacin] Rash 06/15/2021            The following portions of the patient's history were reviewed and updated as appropriate: allergies, current medications, past family history, past medical history, past social history, past surgical history and problem list      Past Medical History:   Diagnosis Date    Asthma     Asthmatic bronchitis with exacerbation     Last Assessed: 8/7/2014    Diabetes mellitus ([facility] [address] )     GERD (gastroesophageal reflux disease)     History of COVID-19 10/27/21    Mild Symptoms- Fever,Chills and Bodyaches    Hyperlipidemia     Hypertension     Impaired fasting glucose     Last Assessed: 1/15/2015    Pneumonia     Sleep apnea     Mild       Past Surgical History:   Procedure Laterality Date    CERVICAL CERCLAGE      COLONOSCOPY      HAND SURGERY Right     Wrist mass E/O    OR ANTERIOR COLPORRAPHY RPR CYSTOCELE W/CYSTO N/A 6/15/2020    Procedure: ANTERIOR AND POSTERIOR COLPORRHAPHY;  Surgeon: Emili Rodriguez MD;  [facility];  Service: UroGynecology           OR CYSTOURETHROSCOPY N/A 6/15/2020    Procedure: Rickford Ashish;  Surgeon: Emili Rodriguez MD;  [facility];  Service: UroGynecology           OR HYSTEROSCOPY,W/ENDO BX N/A 6/18/2021    Procedure: DILATATION AND CURETTAGE (D&C) WITH HYSTEROSCOPY;  Surgeon: Jeri Curling, MD;  [facility];  Service: Gynecology    OR REVAGINAL PROLAPSE,SACROSP LIG N/A 6/15/2020    Procedure: COLPOSUSPENSION VAGINAL EXTRAPERITONEAL(ENPLACE);   Surgeon: Emili Rodriguez MD;  [facility];  Service: UroGynecology           OR SLING OPER STRES INCONTINENCE N/A 6/15/2020    Procedure: PUBOVAGINAL SLING;  Surgeon: Emili Rodriguez MD;  [facility];  Service: UroGynecology           THYROIDECTOMY, PARTIAL      TUBAL LIGATION         Family History   Problem Relation Age of Onset    Coronary artery disease Mother     Diabetes Father     Stroke Father         Ischemic    Diabetes Brother     No Known Problems Sister     No Known Problems Daughter     No Known Problems Maternal Grandmother     No Known Problems Maternal Grandfather     No Known Problems Paternal Grandmother     No Known Problems Paternal Grandfather     No Known Problems Son     No Known Problems Maternal Aunt     No Known Problems Maternal Aunt     No Known Problems Maternal Aunt     Cancer Paternal Aunt         unknown type    No Known Problems Paternal Aunt     No Known Problems Paternal Aunt     No Known Problems Paternal Aunt     No Known Problems Paternal Aunt     No Known Problems Paternal Aunt     No Known Problems Paternal Aunt     No Known Problems Paternal Aunt     Cancer Paternal Uncle         unknown type    Cancer Paternal Uncle         unknown type    Breast cancer Cousin         unknown age         Medications have been verified  Objective   Pulse 98   Temp 97 9 °F (36 6 °C) (Temporal)   Resp 16   Ht 5' 2" (1 575 m)   Wt 90 7 kg (200 lb)   LMP 12/12/2019 (Exact Date)   SpO2 98%   BMI 36 58 kg/m²   Patient's last menstrual period was 12/12/2019 (exact date)  Physical Exam     Physical Exam  Constitutional:       General: She is not in acute distress  Appearance: Normal appearance  HENT:      Head: Normocephalic  Nose: No congestion or rhinorrhea  Mouth/Throat:      Mouth: Mucous membranes are moist       Pharynx: No oropharyngeal exudate or posterior oropharyngeal erythema  Eyes:      General:         Right eye: No discharge  Left eye: No discharge  Conjunctiva/sclera: Conjunctivae normal    Cardiovascular:      Rate and Rhythm: Normal rate and regular rhythm  Pulses: Normal pulses  Pulmonary:      Effort: Pulmonary effort is normal  No respiratory distress  Breath sounds: Wheezing present  Comments: Few wheezes right middle quadrant  Abdominal:      General: Abdomen is flat  There is no distension  Palpations: Abdomen is soft  Tenderness: There is no abdominal tenderness  Musculoskeletal:      Cervical back: Neck supple  Skin:     General: Skin is warm  Capillary Refill: Capillary refill takes less than 2 seconds  Neurological:      Mental Status: She is alert and oriented to person, place, and time

## 2022-05-04 DIAGNOSIS — I10 ESSENTIAL HYPERTENSION: ICD-10-CM

## 2022-05-04 RX ORDER — DILTIAZEM HYDROCHLORIDE 120 MG/1
CAPSULE, EXTENDED RELEASE ORAL
Qty: 30 CAPSULE | Refills: 3 | Status: SHIPPED | OUTPATIENT
Start: 2022-05-04

## 2022-05-10 DIAGNOSIS — E11.9 TYPE 2 DIABETES MELLITUS WITHOUT COMPLICATION, WITHOUT LONG-TERM CURRENT USE OF INSULIN (HCC): ICD-10-CM

## 2022-05-10 RX ORDER — METFORMIN HYDROCHLORIDE 500 MG/1
1000 TABLET, EXTENDED RELEASE ORAL 2 TIMES DAILY WITH MEALS
Qty: 360 TABLET | Refills: 0 | Status: SHIPPED | OUTPATIENT
Start: 2022-05-10 | End: 2022-08-10

## 2022-05-31 DIAGNOSIS — I10 ESSENTIAL HYPERTENSION: ICD-10-CM

## 2022-06-01 RX ORDER — LISINOPRIL AND HYDROCHLOROTHIAZIDE 25; 20 MG/1; MG/1
TABLET ORAL
Qty: 90 TABLET | Refills: 0 | Status: SHIPPED | OUTPATIENT
Start: 2022-06-01

## 2022-07-24 DIAGNOSIS — R10.13 DYSPEPSIA: ICD-10-CM

## 2022-07-25 RX ORDER — PANTOPRAZOLE SODIUM 40 MG/1
TABLET, DELAYED RELEASE ORAL
Qty: 90 TABLET | Refills: 1 | Status: SHIPPED | OUTPATIENT
Start: 2022-07-25

## 2022-08-10 DIAGNOSIS — E11.9 TYPE 2 DIABETES MELLITUS WITHOUT COMPLICATION, WITHOUT LONG-TERM CURRENT USE OF INSULIN (HCC): ICD-10-CM

## 2022-08-10 RX ORDER — METFORMIN HYDROCHLORIDE 500 MG/1
1000 TABLET, EXTENDED RELEASE ORAL 2 TIMES DAILY WITH MEALS
Qty: 360 TABLET | Refills: 0 | Status: SHIPPED | OUTPATIENT
Start: 2022-08-10 | End: 2022-11-08

## 2022-08-17 DIAGNOSIS — I10 ESSENTIAL HYPERTENSION: ICD-10-CM

## 2022-08-17 NOTE — TELEPHONE ENCOUNTER
Per last encounter 2/28/22:  Essential hypertension  Uncontrolled  on medications  Today BP is 167/99  Blood pressure log reviewed  home systolic BPs range from 917T to 170s  Currently asymptomatic  Pt endorses compliance with medication regimen  Plan:  · Currently on Lisinopril-HCTZ 20-25mg daily and clonidine 0 1mg QHS; sent script for additional lisinopril 20 mg daily  Stressed the importance of never missing a dose of clonidine due to the risk of worsened BP elevation  · Continue to keep BP log for next 2 weeks   · F/u in 2 weeks to evaluate BP log      Does the patient need to continue with this medication request or continue with current regimen?   Please advise  Thank You

## 2022-08-18 RX ORDER — DILTIAZEM HYDROCHLORIDE 120 MG/1
CAPSULE, EXTENDED RELEASE ORAL
Qty: 30 CAPSULE | Refills: 3 | Status: SHIPPED | OUTPATIENT
Start: 2022-08-18

## 2022-08-24 DIAGNOSIS — I10 ESSENTIAL HYPERTENSION: ICD-10-CM

## 2022-08-24 DIAGNOSIS — E55.9 VITAMIN D INSUFFICIENCY: ICD-10-CM

## 2022-08-24 RX ORDER — LISINOPRIL AND HYDROCHLOROTHIAZIDE 25; 20 MG/1; MG/1
TABLET ORAL
Qty: 90 TABLET | Refills: 0 | Status: SHIPPED | OUTPATIENT
Start: 2022-08-24

## 2022-08-25 DIAGNOSIS — B34.9 VIRAL INFECTION, UNSPECIFIED: Primary | ICD-10-CM

## 2022-08-25 RX ORDER — ASCORBIC ACID 500 MG
500 TABLET ORAL DAILY
Qty: 90 TABLET | Refills: 1 | Status: SHIPPED | OUTPATIENT
Start: 2022-08-25

## 2022-08-26 RX ORDER — AVOBENZONE, HOMOSALATE, OCTISALATE, OCTOCRYLENE 30; 100; 50; 25 MG/ML; MG/ML; MG/ML; MG/ML
1000 SPRAY TOPICAL DAILY
Qty: 90 TABLET | Refills: 0 | Status: SHIPPED | OUTPATIENT
Start: 2022-08-26

## 2022-08-29 ENCOUNTER — TELEPHONE (OUTPATIENT)
Dept: FAMILY MEDICINE CLINIC | Facility: CLINIC | Age: 57
End: 2022-08-29

## 2022-10-12 PROBLEM — J06.9 VIRAL URI: Status: RESOLVED | Noted: 2021-10-25 | Resolved: 2022-10-12

## 2022-11-04 DIAGNOSIS — E11.9 TYPE 2 DIABETES MELLITUS WITHOUT COMPLICATION, WITHOUT LONG-TERM CURRENT USE OF INSULIN (HCC): ICD-10-CM

## 2022-11-07 RX ORDER — METFORMIN HYDROCHLORIDE 500 MG/1
1000 TABLET, EXTENDED RELEASE ORAL 2 TIMES DAILY WITH MEALS
Qty: 360 TABLET | Refills: 0 | Status: SHIPPED | OUTPATIENT
Start: 2022-11-07 | End: 2023-02-05

## 2022-11-16 ENCOUNTER — TELEPHONE (OUTPATIENT)
Dept: FAMILY MEDICINE CLINIC | Facility: CLINIC | Age: 57
End: 2022-11-16

## 2022-11-16 ENCOUNTER — CLINICAL SUPPORT (OUTPATIENT)
Dept: FAMILY MEDICINE CLINIC | Facility: CLINIC | Age: 57
End: 2022-11-16

## 2022-11-16 DIAGNOSIS — Z11.1 ENCOUNTER FOR PPD TEST: Primary | ICD-10-CM

## 2022-11-16 NOTE — TELEPHONE ENCOUNTER
Folder (To be completed by) -Dr Kelly BACA     Name of Form - TB summary record     Color folder Luis Mcqueen)    Form to be handed back to patient     Patient was made aware of the 7-10 business day form policy

## 2022-11-16 NOTE — PROGRESS NOTES
Patient here today for a nurse visit for PPD placement  0 1 ml Tubersol (PPD) placed in right forearm at 9:36 am and patient tolerate well  Patient has been reminded she need to come back for ppd reading on Friday 11/18/22 at 9:36 am or before  Patient verbalized understanding

## 2022-11-16 NOTE — TELEPHONE ENCOUNTER
Patient is here today for PPD placement for (WORK)  TB test was placed on (11/16/22) at (9:36 AM)  PPD placed in (RIGHT FOREARM)  Patient is aware to return for TB test on (11/18/22) before (9:36 AM)  Patient (DOES) have paperwork to be completed  Form can be found in the (GREEN) folder at the nurse station

## 2022-11-18 ENCOUNTER — CLINICAL SUPPORT (OUTPATIENT)
Dept: FAMILY MEDICINE CLINIC | Facility: CLINIC | Age: 57
End: 2022-11-18

## 2022-11-18 DIAGNOSIS — Z11.1 ENCOUNTER FOR PPD SKIN TEST READING: Primary | ICD-10-CM

## 2022-11-18 NOTE — TELEPHONE ENCOUNTER
Patient presents today for PPD read  PPD is negative at this time  Patient's form has been completed and returned to patient  Copy of form has been given to clerical staff member to be scanned into patient's chart  Patient is unsure if second PPD will need to be placed  Patient was advised that she should contact her employer to find out if this is needed  Patient verbalized understanding and stated she will reach out if she needs a second PPD placement

## 2022-11-18 NOTE — PROGRESS NOTES
Patient presents today for PPD read  PPD is negative at this time  Patient's form has been completed and returned to patient  Copy of form has been given to clerical staff member to be scanned into patient's chart

## 2022-11-22 DIAGNOSIS — E55.9 VITAMIN D INSUFFICIENCY: ICD-10-CM

## 2022-11-22 DIAGNOSIS — I10 ESSENTIAL HYPERTENSION: ICD-10-CM

## 2022-11-24 DIAGNOSIS — I10 ESSENTIAL HYPERTENSION: ICD-10-CM

## 2022-11-25 RX ORDER — MELATONIN
Qty: 90 TABLET | Refills: 0 | Status: SHIPPED | OUTPATIENT
Start: 2022-11-25

## 2022-11-25 RX ORDER — DILTIAZEM HYDROCHLORIDE 120 MG/1
CAPSULE, EXTENDED RELEASE ORAL
Qty: 30 CAPSULE | Refills: 3 | Status: SHIPPED | OUTPATIENT
Start: 2022-11-25

## 2022-11-28 ENCOUNTER — TELEPHONE (OUTPATIENT)
Dept: CARDIOLOGY CLINIC | Facility: CLINIC | Age: 57
End: 2022-11-28

## 2022-11-28 RX ORDER — LISINOPRIL AND HYDROCHLOROTHIAZIDE 25; 20 MG/1; MG/1
TABLET ORAL
Qty: 90 TABLET | Refills: 0 | Status: SHIPPED | OUTPATIENT
Start: 2022-11-28

## 2022-11-29 ENCOUNTER — OFFICE VISIT (OUTPATIENT)
Dept: FAMILY MEDICINE CLINIC | Facility: CLINIC | Age: 57
End: 2022-11-29

## 2022-11-29 VITALS
RESPIRATION RATE: 21 BRPM | DIASTOLIC BLOOD PRESSURE: 89 MMHG | HEIGHT: 62 IN | HEART RATE: 76 BPM | WEIGHT: 200 LBS | BODY MASS INDEX: 36.8 KG/M2 | OXYGEN SATURATION: 97 % | SYSTOLIC BLOOD PRESSURE: 146 MMHG | TEMPERATURE: 98.2 F

## 2022-11-29 DIAGNOSIS — I10 HTN (HYPERTENSION), BENIGN: ICD-10-CM

## 2022-11-29 DIAGNOSIS — R10.13 DYSPEPSIA: ICD-10-CM

## 2022-11-29 DIAGNOSIS — I10 ESSENTIAL HYPERTENSION: Primary | ICD-10-CM

## 2022-11-29 DIAGNOSIS — E78.5 HYPERLIPIDEMIA, UNSPECIFIED HYPERLIPIDEMIA TYPE: ICD-10-CM

## 2022-11-29 DIAGNOSIS — E11.9 TYPE 2 DIABETES MELLITUS WITHOUT COMPLICATION, WITHOUT LONG-TERM CURRENT USE OF INSULIN (HCC): ICD-10-CM

## 2022-11-29 RX ORDER — PANTOPRAZOLE SODIUM 40 MG/1
40 TABLET, DELAYED RELEASE ORAL DAILY
Qty: 90 TABLET | Refills: 1 | Status: SHIPPED | OUTPATIENT
Start: 2022-11-29

## 2022-11-29 RX ORDER — CLONIDINE HYDROCHLORIDE 0.1 MG/1
0.1 TABLET ORAL EVERY 12 HOURS
Qty: 60 TABLET | Refills: 5 | Status: SHIPPED | OUTPATIENT
Start: 2022-11-29

## 2022-11-29 RX ORDER — ATORVASTATIN CALCIUM 20 MG/1
20 TABLET, FILM COATED ORAL DAILY
Qty: 30 TABLET | Refills: 3 | Status: SHIPPED | OUTPATIENT
Start: 2022-11-29

## 2022-11-29 NOTE — PROGRESS NOTES
Name: Jossy Tse      : 1965      MRN: [id number]  Encounter Provider: Martha Godinez MD  Encounter Date: 2022   Encounter department: [address] [place] [address]  Essential hypertension  Assessment & Plan:  BP today is 146/89 improved from prior BP in February  Above goal of <140/90  Patient taking lisinopril-HCTZ 20-25 , clonidine 0 1mg, diltiazem 120 mg    Plan  · Continue current BP regimen  · Patient has appointment with cardiology today    Orders:  -     cloNIDine (CATAPRES) 0 1 mg tablet; Take 1 tablet (0 1 mg total) by mouth every 12 (twelve) hours    2  Hyperlipidemia, unspecified hyperlipidemia type  Assessment & Plan: Total cholesterol 237 in February    Plan  · Refill Lipitor 20 mg   · Repeat labs in 3 months    Orders:  -     atorvastatin (LIPITOR) 20 mg tablet; Take 1 tablet (20 mg total) by mouth daily    3  Dyspepsia  Assessment & Plan:  Stable  No complaints today  Refill protonix 40 mg daily  Orders:  -     pantoprazole (PROTONIX) 40 mg tablet; Take 1 tablet (40 mg total) by mouth daily    4  HTN (hypertension), benign  -     cloNIDine (CATAPRES) 0 1 mg tablet; Take 1 tablet (0 1 mg total) by mouth every 12 (twelve) hours    5  Type 2 diabetes mellitus without complication, without long-term current use of insulin ([facility])  Assessment & Plan:    Lab Results   Component Value Date    HGBA1C 7 5 (A) 2022   A1c today 7 8 from 7 5 in February  Above goal of <7 0     Plan:  - Due to uncontrolled HgA1c, will start Jardiance 10mg daily  Continue Metformin 1000 bid  - Discussed side effects of Jardiance   · F/u in 3 months for A1c    Orders:  -     Empagliflozin (Jardiance) 10 MG TABS tablet; Take 1 tablet (10 mg total) by mouth every morning         Subjective      Patient presents to the clinic today for follow-up of hypertension, diabetes and medication refill  She reports to be doing well overall    She denies any acute complaints or concerns today  She has an appointment with Cardiology  Review of Systems   Constitutional: Negative for chills and fever  Respiratory: Negative for cough and shortness of breath  Cardiovascular: Negative for chest pain and palpitations  Gastrointestinal: Negative for abdominal pain and vomiting  Genitourinary: Negative for dysuria and hematuria  Musculoskeletal: Negative for arthralgias and back pain  Skin: Negative for color change and rash  Neurological: Negative for dizziness and headaches  All other systems reviewed and are negative        Current Outpatient Medications on File Prior to Visit   Medication Sig   • albuterol (Ventolin HFA) 90 mcg/act inhaler Inhale 2 puffs every 4 (four) hours as needed for wheezing or shortness of breath   • ascorbic acid (VITAMIN C) 500 mg tablet Take 1 tablet (500 mg total) by mouth daily   • benzonatate (TESSALON) 200 MG capsule Take 1 capsule (200 mg total) by mouth 3 (three) times a day as needed for cough   • Bioflavonoid Products (VITAMIN C PLUS PO) Take by mouth   • cholecalciferol (VITAMIN D3) 1,000 units tablet take 1 tablet by mouth once daily   • diltiazem (DILACOR XR) 120 MG 24 hr capsule take 1 capsule by mouth every morning   • fluticasone (FLONASE) 50 mcg/act nasal spray 1 spray into each nostril daily   • lisinopril (ZESTRIL) 20 mg tablet Take 1 tablet (20 mg total) by mouth daily   • lisinopril-hydrochlorothiazide (PRINZIDE,ZESTORETIC) 20-25 MG per tablet take 1 tablet by mouth once daily   • metFORMIN (GLUCOPHAGE-XR) 500 mg 24 hr tablet Take 2 tablets (1,000 mg total) by mouth 2 (two) times a day with meals Takes 2 500 mg Tablets = 1000 mg Twice/day   • predniSONE 10 mg tablet Take 1 tablet (10 mg total) by mouth daily 6 tab day 1, 5 tab day 2, 4 tab day 3, 3 tab day 4, 2 tab day 5, 1 tab day 6   • [DISCONTINUED] atorvastatin (LIPITOR) 20 mg tablet Take 1 tablet (20 mg total) by mouth daily   • [DISCONTINUED] cloNIDine (CATAPRES) 0 1 mg tablet Take 1 tablet (0 1 mg total) by mouth every 12 (twelve) hours   • [DISCONTINUED] pantoprazole (PROTONIX) 40 mg tablet take 1 tablet by mouth once daily   • acetaminophen (TYLENOL) 650 mg CR tablet Take 1 tablet (650 mg total) by mouth every 8 (eight) hours as needed for mild pain   • ibuprofen (MOTRIN) 600 mg tablet Take 1 tablet (600 mg total) by mouth every 6 (six) hours as needed for mild pain (Patient taking differently: Take 600 mg by mouth every 6 (six) hours as needed for mild pain PRN)       Objective     /89 (BP Location: Left arm, Patient Position: Sitting, Cuff Size: Large)   Pulse 76   Temp 98 2 °F (36 8 °C) (Temporal)   Resp 21   Ht 5' 2" (1 575 m)   Wt 90 7 kg (200 lb)   LMP 12/12/2019 (Exact Date)   SpO2 97%   BMI 36 58 kg/m²     Physical Exam  Vitals reviewed  Constitutional:       General: She is not in acute distress  Appearance: Normal appearance  She is not ill-appearing, toxic-appearing or diaphoretic  HENT:      Head: Normocephalic and atraumatic  Eyes:      General:         Right eye: No discharge  Left eye: No discharge  Conjunctiva/sclera: Conjunctivae normal    Cardiovascular:      Rate and Rhythm: Normal rate and regular rhythm  Pulses: Normal pulses  Heart sounds: Normal heart sounds  Pulmonary:      Effort: Pulmonary effort is normal  No respiratory distress  Breath sounds: Normal breath sounds  No wheezing  Abdominal:      General: Abdomen is flat  Bowel sounds are normal  There is no distension  Palpations: Abdomen is soft  Tenderness: There is no abdominal tenderness  There is no guarding  Musculoskeletal:         General: Normal range of motion  Right lower leg: No edema  Left lower leg: No edema  Skin:     General: Skin is warm and dry  Neurological:      Mental Status: She is alert  Mental status is at baseline     Psychiatric:         Mood and Affect: Mood normal  Behavior: Behavior normal          Thought Content:  Thought content normal          Judgment: Judgment normal        Rachel Merida MD

## 2022-11-29 NOTE — ASSESSMENT & PLAN NOTE
Lab Results   Component Value Date    HGBA1C 7 5 (A) 02/07/2022   A1c today 7 8 from 7 5 in February  Above goal of <7 0     Plan:  - Due to uncontrolled HgA1c, will start Jardiance 10mg daily  Continue Metformin 1000 bid     - Discussed side effects of Jardiance   · F/u in 3 months for A1c

## 2022-11-29 NOTE — ASSESSMENT & PLAN NOTE
BP today is 146/89 improved from prior BP in February  Above goal of <140/90   Patient taking lisinopril-HCTZ 20-25 , clonidine 0 1mg, diltiazem 120 mg    Plan  · Continue current BP regimen  · Patient has appointment with cardiology today

## 2022-12-12 ENCOUNTER — TELEPHONE (OUTPATIENT)
Dept: FAMILY MEDICINE CLINIC | Facility: CLINIC | Age: 57
End: 2022-12-12

## 2022-12-12 NOTE — TELEPHONE ENCOUNTER
Patient called stating she hasn't receive her Pantoprazole  I ask patient if she called her pharmacy because this medication was sent on 11/29/22 and per patient this medication need prior-authorization  Prior authorization submitted through [employer] at [phone] and confirmation received  Placed in prior-authorization folder at the nurse station

## 2022-12-15 NOTE — TELEPHONE ENCOUNTER
Received fax from patient's insurance, [employer], stating prior authorization could not be processed due to it needing more necessary information  Additional form completed and faxed back to [facility]

## 2022-12-23 DIAGNOSIS — E11.9 TYPE 2 DIABETES MELLITUS WITHOUT COMPLICATION, WITHOUT LONG-TERM CURRENT USE OF INSULIN (HCC): ICD-10-CM

## 2022-12-30 DIAGNOSIS — R10.13 DYSPEPSIA: ICD-10-CM

## 2023-01-13 DIAGNOSIS — E11.9 TYPE 2 DIABETES MELLITUS WITHOUT COMPLICATION, WITHOUT LONG-TERM CURRENT USE OF INSULIN (HCC): ICD-10-CM

## 2023-01-13 DIAGNOSIS — R10.13 DYSPEPSIA: ICD-10-CM

## 2023-01-15 RX ORDER — PANTOPRAZOLE SODIUM 40 MG/1
40 TABLET, DELAYED RELEASE ORAL DAILY
Qty: 90 TABLET | Refills: 1 | Status: SHIPPED | OUTPATIENT
Start: 2023-01-15

## 2023-01-15 RX ORDER — METFORMIN HYDROCHLORIDE 500 MG/1
1000 TABLET, EXTENDED RELEASE ORAL 2 TIMES DAILY WITH MEALS
Qty: 360 TABLET | Refills: 2 | Status: SHIPPED | OUTPATIENT
Start: 2023-01-15 | End: 2023-04-15

## 2023-01-23 DIAGNOSIS — E78.5 HYPERLIPIDEMIA, UNSPECIFIED HYPERLIPIDEMIA TYPE: ICD-10-CM

## 2023-01-23 DIAGNOSIS — E11.9 TYPE 2 DIABETES MELLITUS WITHOUT COMPLICATION, WITHOUT LONG-TERM CURRENT USE OF INSULIN (HCC): ICD-10-CM

## 2023-01-23 DIAGNOSIS — I10 ESSENTIAL HYPERTENSION: ICD-10-CM

## 2023-01-23 DIAGNOSIS — I10 HTN (HYPERTENSION), BENIGN: ICD-10-CM

## 2023-01-23 NOTE — TELEPHONE ENCOUNTER
Patient called for medication refill for  -Jardiance 10 mg tab  -Lipitor 20 mg tab  -Clonidine 0 1 mg tab  Please review and refill, Thanks

## 2023-01-24 RX ORDER — ATORVASTATIN CALCIUM 20 MG/1
20 TABLET, FILM COATED ORAL DAILY
Qty: 30 TABLET | Refills: 3 | Status: SHIPPED | OUTPATIENT
Start: 2023-01-24

## 2023-01-24 RX ORDER — CLONIDINE HYDROCHLORIDE 0.1 MG/1
0.1 TABLET ORAL EVERY 12 HOURS
Qty: 60 TABLET | Refills: 5 | Status: SHIPPED | OUTPATIENT
Start: 2023-01-24

## 2023-02-23 DIAGNOSIS — I10 ESSENTIAL HYPERTENSION: ICD-10-CM

## 2023-02-23 RX ORDER — LISINOPRIL AND HYDROCHLOROTHIAZIDE 25; 20 MG/1; MG/1
TABLET ORAL
Qty: 90 TABLET | Refills: 0 | Status: SHIPPED | OUTPATIENT
Start: 2023-02-23

## 2023-02-23 NOTE — TELEPHONE ENCOUNTER
Please approve or deny Rx request health [facility] protocols not met-thank you       Dr Ant Chu is out on Maternity leave

## 2023-04-25 ENCOUNTER — OFFICE VISIT (OUTPATIENT)
Dept: FAMILY MEDICINE CLINIC | Facility: CLINIC | Age: 58
End: 2023-04-25

## 2023-04-25 VITALS
BODY MASS INDEX: 34.57 KG/M2 | WEIGHT: 189 LBS | DIASTOLIC BLOOD PRESSURE: 75 MMHG | TEMPERATURE: 97.3 F | SYSTOLIC BLOOD PRESSURE: 110 MMHG | OXYGEN SATURATION: 97 % | HEART RATE: 69 BPM | RESPIRATION RATE: 18 BRPM

## 2023-04-25 DIAGNOSIS — E11.9 TYPE 2 DIABETES MELLITUS WITHOUT COMPLICATION, WITHOUT LONG-TERM CURRENT USE OF INSULIN (HCC): Primary | ICD-10-CM

## 2023-04-25 DIAGNOSIS — I10 ESSENTIAL HYPERTENSION: ICD-10-CM

## 2023-04-25 LAB — SL AMB POCT HEMOGLOBIN AIC: 7.2 (ref ?–6.5)

## 2023-04-25 RX ORDER — FLASH GLUCOSE SENSOR
1 KIT MISCELLANEOUS DAILY
Qty: 1 EACH | Refills: 0 | Status: SHIPPED | OUTPATIENT
Start: 2023-04-25

## 2023-04-25 NOTE — PROGRESS NOTES
Name: Meliza Palmer      : 1965      MRN: [id number]  Encounter Provider: Harry Du MD  Encounter Date: 2023   Encounter department: [address] [place] [address]  Type 2 diabetes mellitus without complication, without long-term current use of insulin ([facility])  Assessment & Plan:    Lab Results   Component Value Date    HGBA1C 7 2 (A) 2023     A1c improved from 7 5 to 7 2, but still not at goal (goal <7)  Does not measure ambulatory glucose levels  Current regimen: Metformin 1000mg BID, Jardiance 10mg daily   Currently on ACE-I and statin   DM eye - will set up appoint with ophthalmologist  DM Foot - exam completed today   Patient is interested in starting medication that will also help with weight loss  · Given patient is interested in weight loss, will prescribe Ozempic 0 25mg qweekly  · Continue other medications  · Instructed patient to start logging ambulatory glucose levels  Glucometer ordered  · Follow-up in 4-6 weeks     Orders:  -     Basic metabolic panel; Future  -     Lipid panel; Future  -     TSH, 3rd generation with Free T4 reflex; Future  -     Continuous Blood Gluc Sensor (FreeStyle Phil 14 Day Sensor) MISC; Use 1 each in the morning  -     semaglutide, 0 25 or 0 5 mg/dose, (Ozempic, 0 25 or 0 5 MG/DOSE,) 2 mg/3 mL injection pen; Inject 0 38 mL (0 2533 mg total) under the skin every 7 days  -     Microalbumin / creatinine urine ratio  -     POCT hemoglobin A1c    2  Essential hypertension  Assessment & Plan:  Controlled  Continue current regimen: Lisinopril-HCTZ 20-25mg daily, Clonidine 0 1mg daily, Diltiazem 120mg daily  Ordered BMP, TSH, and FLP  Follow-up in 4-6 weeks to review labs and for physical     Orders:  -     Basic metabolic panel; Future  -     Lipid panel; Future  -     TSH, 3rd generation with Free T4 reflex; Future    3  BMI 34 0-34 9,adult  -     Lipid panel;  Future  -     TSH, 3rd generation with Free T4 reflex; Future  -     semaglutide, 0 25 or 0 5 mg/dose, (Ozempic, 0 25 or 0 5 MG/DOSE,) 2 mg/3 mL injection pen; Inject 0 38 mL (0 2533 mg total) under the skin every 7 days           Subjective      Patient presenting for follow-up of hypertension  Has no complaints today  Hypertension is very well controlled and patient has been compliant with her medications  A1c today 7 2 from 7 5, but still not at goal   Patient is interested in starting medication for weight loss and diabetes  She does not check her sugars at home  She is due for multiple labs and has not had a physical in a while  Review of Systems   Constitutional: Negative for chills and fever  HENT: Negative for ear pain and sore throat  Eyes: Negative for pain and visual disturbance  Respiratory: Negative for cough and shortness of breath  Cardiovascular: Negative for chest pain and palpitations  Gastrointestinal: Negative for abdominal pain and vomiting  Genitourinary: Negative for dysuria and hematuria  Musculoskeletal: Negative for arthralgias and back pain  Skin: Negative for color change and rash  Neurological: Negative for seizures and syncope  All other systems reviewed and are negative        Current Outpatient Medications on File Prior to Visit   Medication Sig   • albuterol (Ventolin HFA) 90 mcg/act inhaler Inhale 2 puffs every 4 (four) hours as needed for wheezing or shortness of breath   • ascorbic acid (VITAMIN C) 500 mg tablet Take 1 tablet (500 mg total) by mouth daily   • atorvastatin (LIPITOR) 20 mg tablet Take 1 tablet (20 mg total) by mouth daily   • Bioflavonoid Products (VITAMIN C PLUS PO) Take by mouth   • cholecalciferol (VITAMIN D3) 1,000 units tablet take 1 tablet by mouth once daily   • cloNIDine (CATAPRES) 0 1 mg tablet Take 1 tablet (0 1 mg total) by mouth every 12 (twelve) hours   • diltiazem (DILACOR XR) 120 MG 24 hr capsule take 1 capsule by mouth every morning   • Empagliflozin (Jardiance) 10 MG TABS tablet Take 1 tablet (10 mg total) by mouth every morning   • fluticasone (FLONASE) 50 mcg/act nasal spray 1 spray into each nostril daily   • lisinopril-hydrochlorothiazide (PRINZIDE,ZESTORETIC) 20-25 MG per tablet take 1 tablet by mouth once daily   • pantoprazole (PROTONIX) 40 mg tablet take 1 tablet by mouth once daily   • benzonatate (TESSALON) 200 MG capsule Take 1 capsule (200 mg total) by mouth 3 (three) times a day as needed for cough   • ibuprofen (MOTRIN) 600 mg tablet Take 1 tablet (600 mg total) by mouth every 6 (six) hours as needed for mild pain (Patient taking differently: Take 600 mg by mouth every 6 (six) hours as needed for mild pain PRN)   • metFORMIN (GLUCOPHAGE-XR) 500 mg 24 hr tablet Take 2 tablets (1,000 mg total) by mouth 2 (two) times a day with meals Takes 2 500 mg Tablets = 1000 mg Twice/day   • predniSONE 10 mg tablet Take 1 tablet (10 mg total) by mouth daily 6 tab day 1, 5 tab day 2, 4 tab day 3, 3 tab day 4, 2 tab day 5, 1 tab day 6 (Patient not taking: Reported on 4/25/2023)   • [DISCONTINUED] acetaminophen (TYLENOL) 650 mg CR tablet Take 1 tablet (650 mg total) by mouth every 8 (eight) hours as needed for mild pain       Objective     /75   Pulse 69   Temp (!) 97 3 °F (36 3 °C)   Resp 18   Wt 85 7 kg (189 lb)   LMP 12/12/2019 (Exact Date)   SpO2 97%   BMI 34 57 kg/m²     Physical Exam  Vitals reviewed  Constitutional:       General: She is not in acute distress  Appearance: Normal appearance  HENT:      Head: Normocephalic and atraumatic  Nose: Nose normal    Eyes:      Extraocular Movements: Extraocular movements intact  Conjunctiva/sclera: Conjunctivae normal    Cardiovascular:      Rate and Rhythm: Normal rate and regular rhythm  Pulses: no weak pulses          Dorsalis pedis pulses are 2+ on the right side and 2+ on the left side  Posterior tibial pulses are 2+ on the right side and 2+ on the left side        Heart sounds: Normal heart sounds  No murmur heard  Pulmonary:      Effort: Pulmonary effort is normal  No respiratory distress  Breath sounds: Normal breath sounds  Musculoskeletal:         General: Normal range of motion  Cervical back: Normal range of motion  Feet:      Right foot:      Skin integrity: No ulcer, skin breakdown, erythema, warmth, callus or dry skin  Left foot:      Skin integrity: No ulcer, skin breakdown, erythema, warmth, callus or dry skin  Skin:     General: Skin is warm  Neurological:      Mental Status: She is alert  Psychiatric:         Mood and Affect: Mood normal          Behavior: Behavior normal        Patient's shoes and socks removed  Right Foot/Ankle   Right Foot Inspection  Skin Exam: skin normal and skin intact  No dry skin, no warmth, no callus, no erythema, no maceration, no abnormal color, no pre-ulcer, no ulcer and no callus  Toe Exam: ROM and strength within normal limits  Sensory   Proprioception: intact  Monofilament testing: intact    Vascular  Capillary refills: < 3 seconds  The right DP pulse is 2+  The right PT pulse is 2+  Left Foot/Ankle  Left Foot Inspection  Skin Exam: skin normal and skin intact  No dry skin, no warmth, no erythema, no maceration, normal color, no pre-ulcer, no ulcer and no callus  Toe Exam: ROM and strength within normal limits  Sensory   Proprioception: intact  Monofilament testing: intact    Vascular  Capillary refills: < 3 seconds  The left DP pulse is 2+  The left PT pulse is 2+       Assign Risk Category  No deformity present  No loss of protective sensation  No weak pulses  Risk: 0        America Helm MD

## 2023-04-25 NOTE — ASSESSMENT & PLAN NOTE
Lab Results   Component Value Date    HGBA1C 7 2 (A) 04/25/2023     A1c improved from 7 5 to 7 2, but still not at goal (goal <7)  Does not measure ambulatory glucose levels  Current regimen: Metformin 1000mg BID, Jardiance 10mg daily   Currently on ACE-I and statin   DM eye - will set up appoint with ophthalmologist  DM Foot - exam completed today   Patient is interested in starting medication that will also help with weight loss  · Given patient is interested in weight loss, will prescribe Ozempic 0 25mg qweekly  · Continue other medications  · Instructed patient to start logging ambulatory glucose levels   Glucometer ordered  · Follow-up in 4-6 weeks

## 2023-04-25 NOTE — ASSESSMENT & PLAN NOTE
Controlled  Continue current regimen: Lisinopril-HCTZ 20-25mg daily, Clonidine 0 1mg daily, Diltiazem 120mg daily  Ordered BMP, TSH, and FLP  Follow-up in 4-6 weeks to review labs and for physical

## 2023-05-22 ENCOUNTER — APPOINTMENT (OUTPATIENT)
Dept: LAB | Facility: CLINIC | Age: 58
End: 2023-05-22

## 2023-05-22 DIAGNOSIS — E78.5 HYPERLIPIDEMIA, UNSPECIFIED HYPERLIPIDEMIA TYPE: ICD-10-CM

## 2023-05-22 DIAGNOSIS — I10 ESSENTIAL HYPERTENSION: ICD-10-CM

## 2023-05-22 DIAGNOSIS — E11.9 TYPE 2 DIABETES MELLITUS WITHOUT COMPLICATION, WITHOUT LONG-TERM CURRENT USE OF INSULIN (HCC): ICD-10-CM

## 2023-05-22 LAB
ANION GAP SERPL CALCULATED.3IONS-SCNC: 8 MMOL/L (ref 4–13)
BUN SERPL-MCNC: 18 MG/DL (ref 5–25)
CALCIUM SERPL-MCNC: 9.2 MG/DL (ref 8.4–10.2)
CHLORIDE SERPL-SCNC: 104 MMOL/L (ref 96–108)
CHOLEST SERPL-MCNC: 141 MG/DL
CO2 SERPL-SCNC: 30 MMOL/L (ref 21–32)
CREAT SERPL-MCNC: 0.7 MG/DL (ref 0.6–1.3)
CREAT UR-MCNC: 198 MG/DL
GFR SERPL CREATININE-BSD FRML MDRD: 95 ML/MIN/1.73SQ M
GLUCOSE P FAST SERPL-MCNC: 124 MG/DL (ref 65–99)
HDLC SERPL-MCNC: 54 MG/DL
LDLC SERPL CALC-MCNC: 59 MG/DL (ref 0–100)
MICROALBUMIN UR-MCNC: 23.8 MG/L (ref 0–20)
MICROALBUMIN/CREAT 24H UR: 12 MG/G CREATININE (ref 0–30)
NONHDLC SERPL-MCNC: 87 MG/DL
POTASSIUM SERPL-SCNC: 3.4 MMOL/L (ref 3.5–5.3)
SODIUM SERPL-SCNC: 142 MMOL/L (ref 135–147)
TRIGL SERPL-MCNC: 138 MG/DL
TSH SERPL DL<=0.05 MIU/L-ACNC: 3.7 UIU/ML (ref 0.45–4.5)

## 2023-05-22 RX ORDER — EMPAGLIFLOZIN 10 MG/1
TABLET, FILM COATED ORAL
Qty: 30 TABLET | Refills: 3 | Status: SHIPPED | OUTPATIENT
Start: 2023-05-22

## 2023-05-22 RX ORDER — ATORVASTATIN CALCIUM 20 MG/1
TABLET, FILM COATED ORAL
Qty: 30 TABLET | Refills: 3 | Status: SHIPPED | OUTPATIENT
Start: 2023-05-22

## 2023-05-23 ENCOUNTER — OFFICE VISIT (OUTPATIENT)
Dept: FAMILY MEDICINE CLINIC | Facility: CLINIC | Age: 58
End: 2023-05-23

## 2023-05-23 VITALS
DIASTOLIC BLOOD PRESSURE: 77 MMHG | BODY MASS INDEX: 34.23 KG/M2 | RESPIRATION RATE: 16 BRPM | HEART RATE: 69 BPM | TEMPERATURE: 98.1 F | OXYGEN SATURATION: 98 % | SYSTOLIC BLOOD PRESSURE: 122 MMHG | WEIGHT: 186 LBS | HEIGHT: 62 IN

## 2023-05-23 DIAGNOSIS — I10 ESSENTIAL HYPERTENSION: ICD-10-CM

## 2023-05-23 DIAGNOSIS — T14.8XXA NONHEALING NONSURGICAL WOUND: ICD-10-CM

## 2023-05-23 DIAGNOSIS — Z00.00 ANNUAL PHYSICAL EXAM: Primary | ICD-10-CM

## 2023-05-23 RX ORDER — LISINOPRIL AND HYDROCHLOROTHIAZIDE 25; 20 MG/1; MG/1
TABLET ORAL
Qty: 90 TABLET | Refills: 0 | Status: SHIPPED | OUTPATIENT
Start: 2023-05-23

## 2023-05-23 NOTE — ASSESSMENT & PLAN NOTE
Pt presents for annual physical  Last BMP 05/22 had fasting glucose @ 126  Pt reports no adverse effects with medication  Pt denies vision change, sensation change, bladder changes       Lab Results   Component Value Date    HGBA1C 7 2 (A) 04/25/2023      - F/U in three months for quarterly A1C

## 2023-05-23 NOTE — ASSESSMENT & PLAN NOTE
Pt presents with 1-week wound of left knee that has not been healing  Pt used clotrimazole with no relief  Pt states wound has been getting better  Pt denies fever, chest pain, neck pain  Wound is 3 cm in diameter, circular, with circumferential erythema  Wound is painful to palpation  Wound has not changed shape nor grown     - Prescribed mupirocen 2% for use three times daily   - Pt counseled on proper wound care   - F/u if wound worsens or does not become better

## 2023-05-23 NOTE — PROGRESS NOTES
[address] [facility] [place]    NAME: Frankie Menon  AGE: 61 y o  SEX: female  : 1965     DATE: 2023     Assessment and Plan:     Problem List Items Addressed This Visit        Other    Annual physical exam - Primary     - Patient presents for annual physical exam  Denies any acute complains or concerns  - Screening for cardiovascular disease: yearly BMP and Lipid panel already ordered and reviewed with patient   - Up to date on  Colorectal, cervical and breast cancer   - Counseled the patient on healthy lifestyle habits         Nonhealing nonsurgical wound     Pt presents with 1-week wound of left knee that has not been healing  Pt used clotrimazole with no relief  Pt states wound has been getting better  Pt denies fever, chest pain, neck pain  Wound is 3 cm in diameter, circular, with circumferential erythema  Wound is painful to palpation  Wound has not changed shape nor grown  - Prescribed mupirocen 2% for use three times daily   - Pt counseled on proper wound care   - F/u if wound worsens or does not become better            Immunizations and preventive care screenings were discussed with patient today  Appropriate education was printed on patient's after visit summary  Counseling:  Alcohol/drug use: discussed moderation in alcohol intake, the recommendations for healthy alcohol use, and avoidance of illicit drug use  Dental Health: discussed importance of regular tooth brushing, flossing, and dental visits  Injury prevention: discussed safety/seat belts, safety helmets, smoke detectors, carbon dioxide detectors, and smoking near bedding or upholstery  Sexual health: discussed sexually transmitted diseases, partner selection, use of condoms, avoidance of unintended pregnancy, and contraceptive alternatives  · Exercise: the importance of regular exercise/physical activity was discussed   Recommend exercise "3-5 times per week for at least 30 minutes  Screenigns - pt getting mammogram tomorrow, pt counseled on getting pap smear but not interested  Pt not interested in getting pneumo  Depression Screening and Follow-up Plan: Patient was screened for depression during today's encounter  They screened negative with a PHQ-2 score of 0  No follow-ups on file  Chief Complaint:     Chief Complaint   Patient presents with   • 6 weeks follow up DM      History of Present Illness:     Adult Annual Physical   Patient here for a comprehensive physical exam  The patient reports problems - infection of right knee  Pt fell in a pool this past weekend after picking up [place]  Pt describes that its painful, red around the edges  Pt has tried \"diabetic creams from her \" but they have not worked  Pt denies shape change, color change  Diet and Physical Activity  · Diet/Nutrition: well balanced diet, limited junk food, consuming 3-5 servings of fruits/vegetables daily, adequate fiber intake and adequate whole grain intake  · Exercise: walking and pt on her feet during the day/walks with grandson  Depression Screening  PHQ-2/9 Depression Screening    Little interest or pleasure in doing things: 0 - not at all  Feeling down, depressed, or hopeless: 0 - not at all  PHQ-2 Score: 0  PHQ-2 Interpretation: Negative depression screen       General Health  · Sleep: sleeps well and gets 4-6 hours of sleep on average  Pt does wake up at night but falls asleep fine  · Hearing: normal - bilateral   · Vision: vision problems: minimal blurriness, pt given reading glasses at eye doctor, goes for regular eye exams and wears glasses  Pt describes eye doctor saying veins have been thick due to the pressure  · Dental: regular dental visits, brushes teeth twice daily and flosses teeth occasionally         /GYN Health  · Patient is: postmenopausal  · Last menstrual period: 2021  · Contraceptive method: " sexually active, no condom use  Review of Systems:     Review of Systems   Constitutional: Negative for fatigue and fever  HENT: Negative for congestion, ear pain, rhinorrhea and sore throat  Eyes: Negative for pain  Respiratory: Negative for choking, chest tightness and shortness of breath  Cardiovascular: Negative for chest pain and palpitations  Gastrointestinal: Negative for constipation, diarrhea, nausea and vomiting  Endocrine: Negative for polyphagia  Genitourinary: Negative for difficulty urinating and pelvic pain  Musculoskeletal: Negative for arthralgias and joint swelling  Skin: Negative  Allergic/Immunologic: Negative  Neurological: Negative for dizziness and headaches  Hematological: Negative  Psychiatric/Behavioral: Negative for confusion and decreased concentration  The patient is not nervous/anxious  Past Medical History:     Past Medical History:   Diagnosis Date   • Asthma    • Asthmatic bronchitis with exacerbation     Last Assessed: 8/7/2014   • [facility]    • GERD (gastroesophageal reflux disease)    • History of COVID-19 10/27/21    Mild Symptoms- Fever,Chills and Bodyaches   • Hyperlipidemia    • Hypertension    • Impaired fasting glucose     Last Assessed: 1/15/2015   • Pneumonia    • Sleep apnea     Mild      Past Surgical History:     Past Surgical History:   Procedure Laterality Date   • CERVICAL CERCLAGE     • COLONOSCOPY     • HAND SURGERY Right     Wrist mass E/O   • KS ANTERIOR COLPORRAPHY RPR CYSTOCELE W/CYSTO N/A 6/15/2020    Procedure: ANTERIOR AND POSTERIOR COLPORRHAPHY;  Surgeon: Jose David Dominguez MD;  Location: [facility];  Service: UroGynecology          • KS COLPOPEXY VAGINAL EXTRAPERITONEAL APPROACH N/A 6/15/2020    Procedure: COLPOSUSPENSION VAGINAL EXTRAPERITONEAL(ENPLACE);   Surgeon: Jose David Dominguez MD;  Location[facility];  Service: UroGynecology          • KS CYSTOURETHROSCOPY N/A 6/15/2020    Procedure: CYSTOSCOPY; Surgeon: Homero Alegria MD;  [facility];  Service: UroGynecology          • TN HYSTEROSCOPY BX ENDOMETRIUM&/POLYPC W/WO D&C N/A 6/18/2021    Procedure: DILATATION AND CURETTAGE (D&C) WITH HYSTEROSCOPY;  Surgeon: Charles Solo MD;  [facility];  Service: Gynecology   • TN SLING OPERATION STRESS INCONTINENCE N/A 6/15/2020    Procedure: PUBOVAGINAL SLING;  Surgeon: Homero Alegria MD;  [facility];  Service: UroGynecology          • THYROIDECTOMY, PARTIAL     • TUBAL LIGATION        Social History:     Social History     Socioeconomic History   • Marital status: /Civil Union     Spouse name: None   • Number of children: None   • Years of education: None   • Highest education level: None   Occupational History   • None   Tobacco Use   • Smoking status: Never   • Smokeless tobacco: Never   Vaping Use   • Vaping Use: Never used   Substance and Sexual Activity   • Alcohol use: No   • Drug use: No   • Sexual activity: Yes     Partners: Male     Birth control/protection: Female Sterilization   Other Topics Concern   • None   Social History Narrative    Uses safety equipment - seatbelts     Social Determinants of Health     Financial Resource Strain: Low Risk    • Difficulty of Paying Living Expenses: Not very hard   Food Insecurity: No Food Insecurity   • Worried About Running Out of Food in the Last Year: Never true   • Ran Out of Food in the Last Year: Never true   Transportation Needs: No Transportation Needs   • Lack of Transportation (Medical): No   • Lack of Transportation (Non-Medical):  No   Physical Activity: Inactive   • Days of Exercise per Week: 0 days   • Minutes of Exercise per Session: 0 min   Stress: No Stress Concern Present   • Feeling of Stress : Not at all   Social Connections: Not on file   Intimate Partner Violence: Not At Risk   • Fear of Current or Ex-Partner: No   • Emotionally Abused: No   • Physically Abused: No   • Sexually Abused: No   Housing Stability: Low Risk    • Unable to Pay for Housing in the Last Year: No   • Number of Places Lived in the Last Year: 1   • Unstable Housing in the Last Year: No      Family History:     Family History   Problem Relation Age of Onset   • Coronary artery disease Mother    • Diabetes Father    • Stroke Father         Ischemic   • Diabetes Brother    • No Known Problems Sister    • No Known Problems Daughter    • No Known Problems Maternal Grandmother    • No Known Problems Maternal Grandfather    • No Known Problems Paternal Grandmother    • No Known Problems Paternal Grandfather    • No Known Problems Son    • No Known Problems Maternal Aunt    • No Known Problems Maternal Aunt    • No Known Problems Maternal Aunt    • Cancer Paternal Aunt         unknown type   • No Known Problems Paternal Aunt    • No Known Problems Paternal Aunt    • No Known Problems Paternal Aunt    • No Known Problems Paternal Aunt    • No Known Problems Paternal Aunt    • No Known Problems Paternal Aunt    • No Known Problems Paternal Aunt    • Cancer Paternal Uncle         unknown type   • Cancer Paternal Uncle         unknown type   • Breast cancer Cousin         unknown age      Current Medications:     Current Outpatient Medications   Medication Sig Dispense Refill   • albuterol (Ventolin HFA) 90 mcg/act inhaler Inhale 2 puffs every 4 (four) hours as needed for wheezing or shortness of breath 8 g 0   • ascorbic acid (VITAMIN C) 500 mg tablet Take 1 tablet (500 mg total) by mouth daily 90 tablet 1   • atorvastatin (LIPITOR) 20 mg tablet take 1 tablet by mouth once daily 30 tablet 3   • Bioflavonoid Products (VITAMIN C PLUS PO) Take by mouth     • cholecalciferol (VITAMIN D3) 1,000 units tablet take 1 tablet by mouth once daily 90 tablet 0   • cloNIDine (CATAPRES) 0 1 mg tablet Take 1 tablet (0 1 mg total) by mouth every 12 (twelve) hours 60 tablet 5   • Continuous Blood Gluc Sensor (FreeStyle Phil 14 Day Sensor) MISC Use 1 each in the "morning 1 each 0   • diltiazem (DILACOR XR) 120 MG 24 hr capsule take 1 capsule by mouth every morning 30 capsule 3   • fluticasone (FLONASE) 50 mcg/act nasal spray 1 spray into each nostril daily 11 1 mL 1   • Jardiance 10 MG TABS tablet take 1 tablet by mouth every morning 30 tablet 3   • lisinopril-hydrochlorothiazide (PRINZIDE,ZESTORETIC) 20-25 MG per tablet take 1 tablet by mouth once daily 90 tablet 0   • metFORMIN (GLUCOPHAGE-XR) 500 mg 24 hr tablet Take 2 tablets (1,000 mg total) by mouth 2 (two) times a day with meals Takes 2 500 mg Tablets = 1000 mg Twice/day 360 tablet 2   • pantoprazole (PROTONIX) 40 mg tablet take 1 tablet by mouth once daily 90 tablet 1   • semaglutide, 0 25 or 0 5 mg/dose, (Ozempic, 0 25 or 0 5 MG/DOSE,) 2 mg/3 mL injection pen Inject 0 38 mL (0 2533 mg total) under the skin every 7 days 3 mL 2   • benzonatate (TESSALON) 200 MG capsule Take 1 capsule (200 mg total) by mouth 3 (three) times a day as needed for cough 20 capsule 0   • ibuprofen (MOTRIN) 600 mg tablet Take 1 tablet (600 mg total) by mouth every 6 (six) hours as needed for mild pain (Patient taking differently: Take 600 mg by mouth every 6 (six) hours as needed for mild pain PRN) 30 tablet 0   • predniSONE 10 mg tablet Take 1 tablet (10 mg total) by mouth daily 6 tab day 1, 5 tab day 2, 4 tab day 3, 3 tab day 4, 2 tab day 5, 1 tab day 6 (Patient not taking: Reported on 4/25/2023) 21 tablet 0     No current facility-administered medications for this visit  Allergies: Allergies   Allergen Reactions   • Levaquin [Levofloxacin] Rash      Physical Exam:     /77   Pulse 69   Temp 98 1 °F (36 7 °C) (Temporal)   Resp 16   Ht 5' 2\" (1 575 m)   Wt 84 4 kg (186 lb)   LMP 12/12/2019 (Exact Date)   SpO2 98%   BMI 34 02 kg/m²     Physical Exam  Constitutional:       General: She is not in acute distress  Appearance: Normal appearance  She is obese  She is not ill-appearing, toxic-appearing or diaphoretic   " HENT:      Head: Normocephalic  Right Ear: Tympanic membrane, ear canal and external ear normal       Left Ear: Tympanic membrane, ear canal and external ear normal       Nose: Nose normal       Mouth/Throat:      Mouth: Mucous membranes are moist       Pharynx: Oropharynx is clear  Eyes:      Pupils: Pupils are equal, round, and reactive to light  Cardiovascular:      Rate and Rhythm: Normal rate and regular rhythm  Pulses: Normal pulses  Heart sounds: Normal heart sounds  Pulmonary:      Effort: Pulmonary effort is normal       Breath sounds: Normal breath sounds  Abdominal:      General: Abdomen is flat  Bowel sounds are normal  There is no distension  Palpations: There is no mass  Musculoskeletal:         General: Normal range of motion  Cervical back: Normal range of motion  Skin:     General: Skin is warm and dry  Neurological:      Mental Status: She is alert  Mental status is at baseline  Psychiatric:         Mood and Affect: Mood normal          Behavior: Behavior normal  Behavior is cooperative  Thought Content:  Thought content normal          Judgment: Judgment normal           Kaia López MD  [address]

## 2023-05-23 NOTE — ASSESSMENT & PLAN NOTE
- Patient presents for annual physical exam  Denies any acute complains or concerns    - Screening for cardiovascular disease: yearly BMP and Lipid panel already ordered and reviewed with patient   - Up to date on  Colorectal, cervical and breast cancer   - Counseled the patient on healthy lifestyle habits

## 2023-06-07 DIAGNOSIS — E11.9 TYPE 2 DIABETES MELLITUS WITHOUT COMPLICATION, WITHOUT LONG-TERM CURRENT USE OF INSULIN (HCC): ICD-10-CM

## 2023-06-12 NOTE — TELEPHONE ENCOUNTER
Patient called this afternoon asking for a high dose  Patient asked if she can do 1 MG  Patient asked for it to be sent to [facility]

## 2023-06-16 ENCOUNTER — HOSPITAL ENCOUNTER (OUTPATIENT)
Dept: MAMMOGRAPHY | Facility: MEDICAL CENTER | Age: 58
Discharge: HOME | End: 2023-06-16
Payer: COMMERCIAL

## 2023-06-16 VITALS — WEIGHT: 186 LBS | HEIGHT: 62 IN | BODY MASS INDEX: 34.23 KG/M2

## 2023-06-16 DIAGNOSIS — Z12.31 ENCOUNTER FOR SCREENING MAMMOGRAM FOR MALIGNANT NEOPLASM OF BREAST: ICD-10-CM

## 2023-06-16 PROCEDURE — 77067 SCR MAMMO BI INCL CAD: CPT

## 2023-06-16 PROCEDURE — 77063 BREAST TOMOSYNTHESIS BI: CPT

## 2023-06-23 ENCOUNTER — OFFICE VISIT (OUTPATIENT)
Dept: ENDOCRINOLOGY | Facility: CLINIC | Age: 58
End: 2023-06-23
Payer: COMMERCIAL

## 2023-06-23 VITALS
DIASTOLIC BLOOD PRESSURE: 78 MMHG | BODY MASS INDEX: 33.31 KG/M2 | HEIGHT: 62 IN | HEART RATE: 79 BPM | TEMPERATURE: 98 F | OXYGEN SATURATION: 98 % | WEIGHT: 181 LBS | SYSTOLIC BLOOD PRESSURE: 120 MMHG

## 2023-06-23 DIAGNOSIS — E78.2 MIXED HYPERLIPIDEMIA: ICD-10-CM

## 2023-06-23 DIAGNOSIS — E55.9 VITAMIN D DEFICIENCY: ICD-10-CM

## 2023-06-23 DIAGNOSIS — E66.9 OBESITY (BMI 30-39.9): ICD-10-CM

## 2023-06-23 DIAGNOSIS — E11.65 TYPE 2 DIABETES MELLITUS WITH HYPERGLYCEMIA, WITHOUT LONG-TERM CURRENT USE OF INSULIN (HCC): Primary | ICD-10-CM

## 2023-06-23 PROCEDURE — 99205 OFFICE O/P NEW HI 60 MIN: CPT | Performed by: INTERNAL MEDICINE

## 2023-07-21 DIAGNOSIS — I10 ESSENTIAL HYPERTENSION: ICD-10-CM

## 2023-07-21 DIAGNOSIS — I10 HTN (HYPERTENSION), BENIGN: ICD-10-CM

## 2023-07-21 RX ORDER — CLONIDINE HYDROCHLORIDE 0.1 MG/1
0.1 TABLET ORAL EVERY 12 HOURS
Qty: 60 TABLET | Refills: 5 | Status: SHIPPED | OUTPATIENT
Start: 2023-07-21

## 2023-08-08 DIAGNOSIS — I10 ESSENTIAL HYPERTENSION: ICD-10-CM

## 2023-08-14 RX ORDER — DILTIAZEM HYDROCHLORIDE 120 MG/1
CAPSULE, EXTENDED RELEASE ORAL
Qty: 30 CAPSULE | Refills: 3 | Status: SHIPPED | OUTPATIENT
Start: 2023-08-14

## 2023-08-19 DIAGNOSIS — I10 ESSENTIAL HYPERTENSION: ICD-10-CM

## 2023-08-21 DIAGNOSIS — E11.9 TYPE 2 DIABETES MELLITUS WITHOUT COMPLICATION, WITHOUT LONG-TERM CURRENT USE OF INSULIN (HCC): ICD-10-CM

## 2023-08-21 RX ORDER — LISINOPRIL AND HYDROCHLOROTHIAZIDE 25; 20 MG/1; MG/1
TABLET ORAL
Qty: 90 TABLET | Refills: 0 | Status: SHIPPED | OUTPATIENT
Start: 2023-08-21

## 2023-09-12 DIAGNOSIS — E11.65 TYPE 2 DIABETES MELLITUS WITH HYPERGLYCEMIA, WITHOUT LONG-TERM CURRENT USE OF INSULIN (HCC): ICD-10-CM

## 2023-09-17 DIAGNOSIS — E78.5 HYPERLIPIDEMIA, UNSPECIFIED HYPERLIPIDEMIA TYPE: ICD-10-CM

## 2023-09-19 RX ORDER — ATORVASTATIN CALCIUM 20 MG/1
TABLET, FILM COATED ORAL
Qty: 30 TABLET | Refills: 3 | Status: SHIPPED | OUTPATIENT
Start: 2023-09-19

## 2023-09-30 ENCOUNTER — APPOINTMENT (OUTPATIENT)
Dept: LAB | Facility: CLINIC | Age: 58
End: 2023-09-30
Payer: COMMERCIAL

## 2023-09-30 DIAGNOSIS — E11.65 TYPE 2 DIABETES MELLITUS WITH HYPERGLYCEMIA, WITHOUT LONG-TERM CURRENT USE OF INSULIN (HCC): ICD-10-CM

## 2023-09-30 LAB
CREAT UR-MCNC: 157.7 MG/DL
EST. AVERAGE GLUCOSE BLD GHB EST-MCNC: 137 MG/DL
FERRITIN SERPL-MCNC: 10 NG/ML (ref 11–307)
HBA1C MFR BLD: 6.4 %
IRON SATN MFR SERPL: 11 % (ref 15–50)
IRON SERPL-MCNC: 40 UG/DL (ref 50–212)
MICROALBUMIN UR-MCNC: 14.8 MG/L
MICROALBUMIN/CREAT 24H UR: 9 MG/G CREATININE (ref 0–30)
TIBC SERPL-MCNC: 355 UG/DL (ref 250–450)
UIBC SERPL-MCNC: 315 UG/DL (ref 155–355)

## 2023-09-30 PROCEDURE — 83036 HEMOGLOBIN GLYCOSYLATED A1C: CPT

## 2023-09-30 PROCEDURE — 83540 ASSAY OF IRON: CPT

## 2023-09-30 PROCEDURE — 82570 ASSAY OF URINE CREATININE: CPT

## 2023-09-30 PROCEDURE — 82043 UR ALBUMIN QUANTITATIVE: CPT

## 2023-09-30 PROCEDURE — 83550 IRON BINDING TEST: CPT

## 2023-09-30 PROCEDURE — 82728 ASSAY OF FERRITIN: CPT

## 2023-09-30 PROCEDURE — 36415 COLL VENOUS BLD VENIPUNCTURE: CPT

## 2023-10-02 ENCOUNTER — OFFICE VISIT (OUTPATIENT)
Dept: ENDOCRINOLOGY | Facility: CLINIC | Age: 58
End: 2023-10-02
Payer: COMMERCIAL

## 2023-10-02 VITALS
OXYGEN SATURATION: 98 % | BODY MASS INDEX: 31.69 KG/M2 | WEIGHT: 172.2 LBS | DIASTOLIC BLOOD PRESSURE: 76 MMHG | HEIGHT: 62 IN | SYSTOLIC BLOOD PRESSURE: 122 MMHG | HEART RATE: 65 BPM | TEMPERATURE: 97.1 F

## 2023-10-02 DIAGNOSIS — E66.9 OBESITY (BMI 30-39.9): ICD-10-CM

## 2023-10-02 DIAGNOSIS — E55.9 VITAMIN D DEFICIENCY: ICD-10-CM

## 2023-10-02 DIAGNOSIS — E11.65 TYPE 2 DIABETES MELLITUS WITH HYPERGLYCEMIA, WITHOUT LONG-TERM CURRENT USE OF INSULIN (HCC): Primary | ICD-10-CM

## 2023-10-02 DIAGNOSIS — E61.1 IRON DEFICIENCY: ICD-10-CM

## 2023-10-02 DIAGNOSIS — E11.9 TYPE 2 DIABETES MELLITUS WITHOUT COMPLICATION, WITHOUT LONG-TERM CURRENT USE OF INSULIN (HCC): ICD-10-CM

## 2023-10-02 DIAGNOSIS — E78.2 MIXED HYPERLIPIDEMIA: ICD-10-CM

## 2023-10-02 PROBLEM — M25.561 RIGHT KNEE PAIN: Status: RESOLVED | Noted: 2017-11-20 | Resolved: 2023-10-02

## 2023-10-02 PROBLEM — M79.2 RADICULAR PAIN OF LEFT UPPER EXTREMITY: Status: RESOLVED | Noted: 2017-11-20 | Resolved: 2023-10-02

## 2023-10-02 PROBLEM — M76.31 ILIOTIBIAL BAND SYNDROME OF RIGHT SIDE: Status: RESOLVED | Noted: 2021-02-06 | Resolved: 2023-10-02

## 2023-10-02 PROBLEM — Z00.00 ANNUAL PHYSICAL EXAM: Status: RESOLVED | Noted: 2022-02-07 | Resolved: 2023-10-02

## 2023-10-02 PROBLEM — R79.89 ABNORMAL TSH: Status: RESOLVED | Noted: 2019-09-09 | Resolved: 2023-10-02

## 2023-10-02 PROBLEM — B34.9 VIRAL INFECTION, UNSPECIFIED: Status: RESOLVED | Noted: 2021-12-09 | Resolved: 2023-10-02

## 2023-10-02 PROBLEM — U07.1 COVID-19: Status: RESOLVED | Noted: 2020-10-23 | Resolved: 2023-10-02

## 2023-10-02 PROBLEM — R10.13 DYSPEPSIA: Status: RESOLVED | Noted: 2020-04-03 | Resolved: 2023-10-02

## 2023-10-02 PROCEDURE — 99214 OFFICE O/P EST MOD 30 MIN: CPT | Performed by: INTERNAL MEDICINE

## 2023-10-02 RX ORDER — FERROUS GLUCONATE 324(37.5)
324 TABLET ORAL
Qty: 60 TABLET | Refills: 3 | Status: SHIPPED | OUTPATIENT
Start: 2023-10-02

## 2023-10-02 RX ORDER — METFORMIN HYDROCHLORIDE 500 MG/1
1000 TABLET, EXTENDED RELEASE ORAL 2 TIMES DAILY WITH MEALS
Qty: 360 TABLET | Refills: 1 | Status: SHIPPED | OUTPATIENT
Start: 2023-10-02 | End: 2024-03-30

## 2023-10-02 NOTE — PROGRESS NOTES
Follow-up Patient Progress Note         CC: Type 2 diabetes     History of Present Illness:   50-year-old female with type 2 diabetes since 2018, HTN, HLD, obesity BMI 33, KIM, mild intermittent asthma, vitamin D deficiency, GERD, diverticulosis of colon, chronic low back pain and abnormal TSH. Last visit was 6/23/2023. Since last visit, she lost 9 lbs. She feels well.     Home blood glucose monitoring: Checks once or twice a day. Usually less than 200 mg/dL per     Current meds:  Metformin 1000mg BID  Jardiance 10 mg daily  Ozempic 0.5 mg weekly     Opthamology: Yes  Podiatry: No  vaccination: Yes  Dental: No  Pancreatitis: No     Ace/ARB: Lisinopril  Statin: Lipitor 20  Thyroid issues: Recent elevated TSH. Patient Active Problem List   Diagnosis   • Allergic rhinitis   • Gastroesophageal reflux disease   • Hyperlipidemia   • Essential hypertension   • Hypokalemia   • Lower back pain   • Mild intermittent asthma without complication   • Microscopic hematuria   • KIM on CPAP   • Type 2 diabetes mellitus ([address])   • Urinary incontinence in female   • Vitamin D deficiency   • Right knee pain   • Radicular pain of left upper extremity   • Neck pain   • Fibroids   • Diverticulosis of colon   • Olivier's syndrome   • Obesity (BMI 30-39. 9)   • Abnormal TSH   • Subconjunctival hemorrhage of left eye   • Post-menopausal bleeding   • KIM (obstructive sleep apnea)   • Dyspepsia   • Bladder prolapse, female, acquired   • COVID-19   • Iliotibial band syndrome of right side   • S/P D&C (status post dilation and curettage)   • Viral infection, unspecified   • Annual physical exam   • Nonhealing nonsurgical wound     Past Medical History:   Diagnosis Date   • Asthma    • Asthmatic bronchitis with exacerbation     Last Assessed: 8/7/2014   • Diabetes mellitus ([address])    • GERD (gastroesophageal reflux disease)    • History of COVID-19 10/27/21    Mild Symptoms- Fever,Chills and Bodyaches   • Hyperlipidemia    • Hypertension • Impaired fasting glucose     Last Assessed: 1/15/2015   • Pneumonia    • Sleep apnea     Mild      Past Surgical History:   Procedure Laterality Date   • CERVICAL CERCLAGE     • COLONOSCOPY     • HAND SURGERY Right     Wrist mass E/O   • NJ ANTERIOR COLPORRAPHY RPR CYSTOCELE W/CYSTO N/A 6/15/2020    Procedure: ANTERIOR AND POSTERIOR COLPORRHAPHY;  Surgeon: Brandon Christie MD;  Location[facility];  Service: UroGynecology          • NJ COLPOPEXY VAGINAL EXTRAPERITONEAL APPROACH N/A 6/15/2020    Procedure: COLPOSUSPENSION VAGINAL EXTRAPERITONEAL(ENPLACE);   Surgeon: Brandon Christie MD;  [facility];  Service: UroGynecology          • NJ CYSTOURETHROSCOPY N/A 6/15/2020    Procedure: Lexa Jose;  Surgeon: Brandon Christie MD;  [facility];  Service: UroGynecology          • NJ HYSTEROSCOPY BX ENDOMETRIUM&/POLYPC W/WO D&C N/A 6/18/2021    Procedure: DILATATION AND CURETTAGE (D&C) WITH HYSTEROSCOPY;  Surgeon: Jony Cevallos MD;  [facility];  Service: Gynecology   • NJ SLING OPERATION STRESS INCONTINENCE N/A 6/15/2020    Procedure: PUBOVAGINAL SLING;  Surgeon: Brandon Christie MD;  [facility];  Service: UroGynecology          • THYROIDECTOMY, PARTIAL     • TUBAL LIGATION        Family History   Problem Relation Age of Onset   • Coronary artery disease Mother    • Hypertension Mother    • Diabetes Father    • Stroke Father         Ischemic   • Diabetes Brother    • No Known Problems Sister    • No Known Problems Daughter    • No Known Problems Maternal Grandmother    • No Known Problems Maternal Grandfather    • No Known Problems Paternal Grandmother    • No Known Problems Paternal Grandfather    • No Known Problems Son    • No Known Problems Maternal Aunt    • No Known Problems Maternal Aunt    • No Known Problems Maternal Aunt    • Cancer Paternal Aunt         unknown type   • No Known Problems Paternal Aunt    • No Known Problems Paternal Aunt    • No Known Problems Paternal Aunt    • No Known Problems Paternal Aunt    • No Known Problems Paternal Aunt    • No Known Problems Paternal Aunt    • No Known Problems Paternal Aunt    • Cancer Paternal Uncle         unknown type   • Cancer Paternal Uncle         unknown type   • Breast cancer Cousin         unknown age     Social History     Tobacco Use   • Smoking status: Never   • Smokeless tobacco: Never   Substance Use Topics   • Alcohol use: No     Allergies   Allergen Reactions   • Levaquin [Levofloxacin] Rash         Current Outpatient Medications:   •  albuterol (Ventolin HFA) 90 mcg/act inhaler, Inhale 2 puffs every 4 (four) hours as needed for wheezing or shortness of breath, Disp: 8 g, Rfl: 0  •  ascorbic acid (VITAMIN C) 500 mg tablet, Take 1 tablet (500 mg total) by mouth daily, Disp: 90 tablet, Rfl: 1  •  atorvastatin (LIPITOR) 20 mg tablet, take 1 tablet by mouth once daily, Disp: 30 tablet, Rfl: 3  •  Bioflavonoid Products (VITAMIN C PLUS PO), Take by mouth, Disp: , Rfl:   •  cholecalciferol (VITAMIN D3) 1,000 units tablet, take 1 tablet by mouth once daily, Disp: 90 tablet, Rfl: 0  •  cloNIDine (CATAPRES) 0.1 mg tablet, take 1 tablet by mouth every 12 hours, Disp: 60 tablet, Rfl: 5  •  diltiazem (DILACOR XR) 120 MG 24 hr capsule, take 1 capsule by mouth every morning, Disp: 30 capsule, Rfl: 3  •  Empagliflozin (Jardiance) 10 MG TABS tablet, Take 1 tablet (10 mg total) by mouth every morning, Disp: 30 tablet, Rfl: 2  •  fluticasone (FLONASE) 50 mcg/act nasal spray, 1 spray into each nostril daily, Disp: 11.1 mL, Rfl: 1  •  lisinopril-hydrochlorothiazide (PRINZIDE,ZESTORETIC) 20-25 MG per tablet, take 1 tablet by mouth once daily, Disp: 90 tablet, Rfl: 0  •  metFORMIN (GLUCOPHAGE-XR) 500 mg 24 hr tablet, Take 2 tablets (1,000 mg total) by mouth 2 (two) times a day with meals Takes 2 500 mg Tablets = 1000 mg Twice/day, Disp: 360 tablet, Rfl: 2  •  pantoprazole (PROTONIX) 40 mg tablet, take 1 tablet by mouth once daily, Disp: 90 tablet, Rfl: 1  •  semaglutide, 1 mg/dose, (Ozempic, 1 MG/DOSE,) 4 mg/3 mL injection pen, Inject 0.75 mL (1 mg total) under the skin every 7 days, Disp: 9 mL, Rfl: 0  •  Continuous Blood Gluc Sensor (FreeStyle Phil 14 Day Sensor) MISC, Use 1 each in the morning (Patient not taking: Reported on 10/2/2023), Disp: 1 each, Rfl: 0  •  mupirocin (BACTROBAN) 2 % ointment, Apply topically 3 (three) times a day (Patient not taking: Reported on 10/2/2023), Disp: 22 g, Rfl: 0    Review of Systems   HENT: Negative. Eyes: Negative. Respiratory: Negative. Cardiovascular: Negative. Gastrointestinal: Negative. Endocrine: Negative. Musculoskeletal: Negative. Skin: Negative. Allergic/Immunologic: Negative. Neurological: Negative. Hematological: Negative. Psychiatric/Behavioral: Negative. Physical Exam:  Body mass index is 31.5 kg/m². /76 (BP Location: Left arm, Patient Position: Sitting, Cuff Size: Standard)   Pulse 65   Temp (!) 97.1 °F (36.2 °C) (Tympanic)   Ht 5' 2" (1.575 m)   Wt 78.1 kg (172 lb 3.2 oz)   LMP 12/12/2019 (Exact Date)   SpO2 98%   BMI 31.50 kg/m²    Vitals:    10/02/23 1312   Weight: 78.1 kg (172 lb 3.2 oz)        Physical Exam  Constitutional:       General: She is not in acute distress. Appearance: She is well-developed. She is not ill-appearing. HENT:      Head: Normocephalic and atraumatic. Nose: Nose normal.      Mouth/Throat:      Pharynx: Oropharynx is clear. Eyes:      Extraocular Movements: Extraocular movements intact. Conjunctiva/sclera: Conjunctivae normal.   Neck:      Thyroid: No thyromegaly. Cardiovascular:      Rate and Rhythm: Normal rate. Pulmonary:      Effort: Pulmonary effort is normal.   Musculoskeletal:         General: No deformity. Cervical back: Normal range of motion. Skin:     Capillary Refill: Capillary refill takes less than 2 seconds. Coloration: Skin is not pale. Findings: No rash. Neurological:      Mental Status: She is alert and oriented to person, place, and time. Psychiatric:         Behavior: Behavior normal.         Labs:   Lab Results   Component Value Date    HGBA1C 6.4 (H) 09/30/2023       Lab Results   Component Value Date    XMH9EKAZQCZG 3.697 05/22/2023       Lab Results   Component Value Date    CREATININE 0.70 05/22/2023    CREATININE 0.78 02/15/2022    CREATININE 0.60 06/01/2020    BUN 18 05/22/2023     11/05/2015    K 3.4 (L) 05/22/2023     05/22/2023    CO2 30 05/22/2023     eGFR   Date Value Ref Range Status   05/22/2023 95 ml/min/1.73sq m Final       Lab Results   Component Value Date    ALT 41 02/15/2022    AST 29 02/15/2022    ALKPHOS 62 02/15/2022    BILITOT 0.13 (L) 11/05/2015       Lab Results   Component Value Date    CHOLESTEROL 141 05/22/2023    CHOLESTEROL 237 (H) 02/15/2022    CHOLESTEROL 139 10/15/2020     Lab Results   Component Value Date    HDL 54 05/22/2023    HDL 71 02/15/2022    HDL 59 10/15/2020     Lab Results   Component Value Date    TRIG 138 05/22/2023    TRIG 165 (H) 02/15/2022    TRIG 132 10/15/2020     Lab Results   Component Value Date    [address] 87 05/22/2023    [address] 166 02/15/2022    [address] 80 10/15/2020         Impression:  1. Type 2 diabetes mellitus with hyperglycemia, without long-term current use of insulin ([address])    2. Vitamin D deficiency    3. Obesity (BMI 30-39.9)    4. Mixed hyperlipidemia    5. Iron deficiency    6. Type 2 diabetes mellitus without complication, without long-term current use of insulin ([address])         Plan:    Phillip Barajas was seen today for follow-up. Diagnoses and all orders for this visit:    Type 2 diabetes mellitus with hyperglycemia, without long-term current use of insulin ([address] She is improved with A1c 6.4%. Today we discussed options and agreed to continue current therapy as listed below. With significant weight loss, we will expect improvement in HTN and KIM.   In future, will aim to stop Jardiance followed by reduced dose ozempic and continue only metformin long term. She is yet to start medical fitness training program.    Goal weight is 150-155 lbs next visit. Follow up in 6 months.    -     Hemoglobin A1C; Future  -     Albumin / creatinine urine ratio; Future  -     Empagliflozin (Jardiance) 10 MG TABS tablet; Take 1 tablet (10 mg total) by mouth every morning  -     metFORMIN (GLUCOPHAGE-XR) 500 mg 24 hr tablet; Take 2 tablets (1,000 mg total) by mouth 2 (two) times a day with meals Takes 2 500 mg Tablets = 1000 mg Twice/day  -     semaglutide, 2 mg/dose, (Ozempic, 2 MG/DOSE,) 8 mg/ mL injection pen; Inject 0.75 mL (2 mg total) under the skin every 7 days    Vitamin D deficiency  -     Vitamin D 25 hydroxy; Future    Obesity (BMI 30-39. 9)    Mixed hyperlipidemia  -     Lipid panel; Future    Iron deficiency. Feritin was low. Started ferrous fumarate. Type 2 diabetes mellitus without complication, without long-term current use of insulin ([facility])        I have spent 35 minutes with patient today in which greater than 50% of this time was spent in counseling/coordination of care. Discussed with the patient and all questioned fully answered. She will call me if any problems arise. Educated/ Counseled patient on diagnostic test results, prognosis, risk vs benefit of treatment options, importance of treatment compliance, healthy life and lifestyle choices.       [facility]

## 2023-11-17 DIAGNOSIS — E11.65 TYPE 2 DIABETES MELLITUS WITH HYPERGLYCEMIA, WITHOUT LONG-TERM CURRENT USE OF INSULIN (HCC): ICD-10-CM

## 2023-11-22 DIAGNOSIS — I10 ESSENTIAL HYPERTENSION: ICD-10-CM

## 2023-11-25 NOTE — ASSESSMENT & PLAN NOTE
"[facility] Care Plan    POC reviewed with Esperanza Peters and family at 0300. Pt unable to verbalize understanding. No acute events overnight. Pt progressing toward goals. Will continue to monitor. See below and flowsheets for full assessment and VS info.     No acute neuro changes overnight  EVD open @ 0  ICP 5-16 and output 0-2  Chicho, vaso and d5w gtt continued  CTH done  Na check q6h  Urine output  cc/hr  Bath completed and linens changed      Is this a stroke patient? no    Neuro:  Silva Coma Scale  Best Eye Response: 1-->(E1) none  Best Motor Response: 1-->(M1) none  Best Verbal Response: 1-->(V1) none  Silva Coma Scale Score: 3  Assessment Qualifiers: no eye obstruction present, patient chemically sedated or paralyzed, patient intubated  Pupil PERRLA: no     24hr Temp:  [90.9 °F (32.7 °C)-100 °F (37.8 °C)]     CV:   Rhythm: sinus tachycardia  BP goals:   SBP < 140  MAP > 65    Resp:      Vent Mode: A/C  Set Rate: 17 BPM  Oxygen Concentration (%): 30  Vt Set: 400 mL  PEEP/CPAP: 5 cmH20  Pressure Support: 5 cmH20    Plan: N/A    GI/:     Diet/Nutrition Received: NPO  Last Bowel Movement: 11/22/23  Voiding Characteristics: ureteral catheter    Intake/Output Summary (Last 24 hours) at 11/25/2023 0541  Last data filed at 11/25/2023 0501  Gross per 24 hour   Intake 4250.15 ml   Output 3917 ml   Net 333.15 ml     Unmeasured Output  Urine Occurrence: 1  Stool Occurrence: 1  Pad Count: 3    Labs/Accuchecks:  Recent Labs   Lab 11/25/23  0015   WBC 32.98*   RBC 3.82*   HGB 9.5*   HCT 30.3*         Recent Labs   Lab 11/24/23  0354 11/24/23  0824 11/24/23  1804   *   < > 166*   K 3.9  --   --    CO2 23  --   --      --   --    BUN 12  --   --    CREATININE 0.6  --   --    ALKPHOS 32*  --   --    ALT 61*  --   --    AST 26  --   --    BILITOT 0.4  --   --     < > = values in this interval not displayed.      Recent Labs   Lab 11/24/23  1136   INR 1.1   APTT 21.7    No results for input(s): "CPK", " Elevated BMI  Lipid panel on 2/15/22 is abnormal (total cholesterol - 237, TGs - 165, LDL - 133)  Previously on Lipitor 20 mg >1 year ago but discontinued due to improved ASCVD risk  ASCVD risk today 9 9%      Plan:  · Restart Lipitor 20 mg  · Diet and exercise discussed ""CPKMB", "TROPONINI", "MB" in the last 168 hours.    Electrolytes: N/A - electrolytes WDL  Accuchecks: none    Gtts:   dextrose 5 % (D5W) 75 mL/hr at 11/25/23 0501    phenylephrine 2 mcg/kg/min (11/25/23 0301)    vasopressin 0.04 Units/min (11/25/23 0301)       LDA/Wounds:  Lines/Drains/Airways       Central Venous Catheter Line  Duration             Percutaneous Central Line Insertion/Assessment - Triple Lumen  11/24/23 1117 Femoral Vein Right;Femoral Right <1 day              Drain  Duration                  Closed/Suction Drain 11/24/23 1245 Tube - 1 Left Other (Comment) Accordion 10 Fr. <1 day         ICP/Ventriculostomy 11/24/23 1239 Ventricular drainage catheter Left  <1 day         ICP/Ventriculostomy 11/24/23 1355 Ventricular drainage catheter with ICP microsensor Right <1 day         Urethral Catheter 11/24/23 1120 <1 day              Airway  Duration                Airway Anesthesia 11/24/23 1 day              Arterial Line  Duration             Arterial Line 11/24/23 1110 Right Radial <1 day              Peripheral Intravenous Line  Duration                  Peripheral IV - Single Lumen 11/24/23 0355 20 G Anterior;Right Forearm 1 day         Peripheral IV - Single Lumen 11/24/23 0355 Anterior;Left Forearm 1 day                  Wounds: Yes  Wound care consulted: Yes  Problem: Adult Inpatient Plan of Care  Goal: Plan of Care Review  Outcome: Ongoing, Progressing  Goal: Patient-Specific Goal (Individualized)  Outcome: Ongoing, Progressing  Goal: Absence of Hospital-Acquired Illness or Injury  Outcome: Ongoing, Progressing  Goal: Optimal Comfort and Wellbeing  Outcome: Ongoing, Progressing  Goal: Readiness for Transition of Care  Outcome: Ongoing, Progressing     Problem: Fall Injury Risk  Goal: Absence of Fall and Fall-Related Injury  Outcome: Ongoing, Progressing     Problem: Restraint, Nonbehavioral (Nonviolent)  Goal: Absence of Harm or Injury  Outcome: Ongoing, Progressing     Problem: Skin Injury Risk " Increased  Goal: Skin Health and Integrity  Outcome: Ongoing, Progressing     Problem: Infection  Goal: Absence of Infection Signs and Symptoms  Outcome: Ongoing, Progressing

## 2023-11-27 RX ORDER — LISINOPRIL AND HYDROCHLOROTHIAZIDE 25; 20 MG/1; MG/1
TABLET ORAL
Qty: 90 TABLET | Refills: 0 | Status: SHIPPED | OUTPATIENT
Start: 2023-11-27

## 2023-11-30 DIAGNOSIS — J45.21 MILD INTERMITTENT ASTHMA WITH EXACERBATION: ICD-10-CM

## 2023-11-30 RX ORDER — ALBUTEROL SULFATE 90 UG/1
2 AEROSOL, METERED RESPIRATORY (INHALATION) EVERY 4 HOURS PRN
Qty: 8 G | Refills: 0 | Status: CANCELLED | OUTPATIENT
Start: 2023-11-30

## 2023-11-30 RX ORDER — ALBUTEROL SULFATE 90 UG/1
2 AEROSOL, METERED RESPIRATORY (INHALATION) EVERY 4 HOURS PRN
Qty: 8 G | Refills: 0 | Status: SHIPPED | OUTPATIENT
Start: 2023-11-30

## 2023-11-30 NOTE — TELEPHONE ENCOUNTER
Patient called on 11/30 @8:45am. Asking for refill of albuterol pump for asthma. Has a lot of yellow thick mucous/coughing really bad (taking OTC).  Feels too ill to come in for appt.today

## 2023-12-22 ENCOUNTER — HOSPITAL ENCOUNTER (EMERGENCY)
Facility: HOSPITAL | Age: 58
Discharge: HOME | End: 2023-12-22
Attending: EMERGENCY MEDICINE
Payer: COMMERCIAL

## 2023-12-22 ENCOUNTER — APPOINTMENT (EMERGENCY)
Dept: CT IMAGING | Facility: HOSPITAL | Age: 58
End: 2023-12-22
Payer: COMMERCIAL

## 2023-12-22 VITALS
SYSTOLIC BLOOD PRESSURE: 138 MMHG | HEART RATE: 72 BPM | DIASTOLIC BLOOD PRESSURE: 71 MMHG | TEMPERATURE: 97.9 F | RESPIRATION RATE: 18 BRPM | OXYGEN SATURATION: 95 %

## 2023-12-22 DIAGNOSIS — S02.2XXA NASAL BONE FRACTURE: ICD-10-CM
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
## 2023-12-22 PROCEDURE — 70450 CT HEAD/BRAIN W/O DYE: CPT

## 2023-12-22 PROCEDURE — 99284 EMERGENCY DEPT VISIT MOD MDM: CPT | Performed by: EMERGENCY MEDICINE

## 2023-12-22 PROCEDURE — 99284 EMERGENCY DEPT VISIT MOD MDM: CPT

## 2023-12-22 PROCEDURE — G1004 CDSM NDSC: HCPCS

## 2023-12-22 PROCEDURE — 70486 CT MAXILLOFACIAL W/O DYE: CPT

## 2023-12-22 RX ORDER — IBUPROFEN 400 MG/1
400 TABLET ORAL ONCE
Status: COMPLETED | OUTPATIENT
Start: 2023-12-22 | End: 2023-12-22

## 2023-12-22 RX ADMIN — IBUPROFEN 400 MG: 400 TABLET, FILM COATED ORAL at 23:24

## 2023-12-23 NOTE — ED PROVIDER NOTES
History  Chief Complaint   Patient presents with    Fall     Pt tripped and hit face off of wall. -LOC, - thinners, -ASA. C/o nasal pain. Swelling and bruising to nose noted in triage. Pt reports she fell off a ladder last night, pain to R lower back since.     Patient is a 58-year-old female with DM, HTN, high cholesterol that presents to the emergency department after fall today.  She reports losing her balance while at [employer] and an attempt to catch herself was running forward when she struck her face against a wall.  She reports then falling onto her left knee and right hand and has a little pain in these areas.  She also reports that yesterday she was on a ladder and while attempting to step down from the third rung, she missed the step and fell onto her back.  She has had some mild to moderate back pain since that time but denies red flag symptoms of back pain such as bowel or bladder incontinence, saddle anesthesia, or radiating pain.  After both falls she was able to stand and walk without difficulty but reports that after striking the wall she did have a nosebleed that lasted 5 to 10 minutes and stopped after applying ice and direct pressure.  She has had no subsequent nosebleeds.  The patient takes no anticoagulation or antiplatelet medication on daily basis.  She did not lose consciousness.  She reports that she is otherwise felt well and denies any nausea, vomiting, or blurred vision after this injury.        Prior to Admission Medications   Prescriptions Last Dose Informant Patient Reported? Taking?   Bioflavonoid Products (VITAMIN C PLUS PO)   Yes No   Sig: Take by mouth   Continuous Blood Gluc Sensor (FreeStyle Phil 14 Day Sensor) [facility]   No No   Sig: Use 1 each in the morning   Patient not taking: Reported on 10/2/2023   Empagliflozin (Jardiance) 10 MG TABS tablet   No No   Sig: Take 1 tablet (10 mg total) by mouth every morning   albuterol (Ventolin HFA) 90 mcg/act inhaler   No No   Sig: Inhale 2  puffs every 4 (four) hours as needed for wheezing or shortness of breath   ascorbic acid (VITAMIN C) 500 mg tablet   No No   Sig: Take 1 tablet (500 mg total) by mouth daily   atorvastatin (LIPITOR) 20 mg tablet   No No   Sig: take 1 tablet by mouth once daily   cholecalciferol (VITAMIN D3) 1,000 units tablet   No No   Sig: take 1 tablet by mouth once daily   cloNIDine (CATAPRES) 0.1 mg tablet   No No   Sig: take 1 tablet by mouth every 12 hours   diltiazem (DILACOR XR) 120 MG 24 hr capsule   No No   Sig: take 1 capsule by mouth every morning   ferrous gluconate 324 (37.5 Fe) mg   No No   Sig: Take 1 tablet (324 mg total) by mouth 2 (two) times a day before meals   fluticasone (FLONASE) 50 mcg/act nasal spray   No No   Si spray into each nostril daily   lisinopril-hydrochlorothiazide (PRINZIDE,ZESTORETIC) 20-25 MG per tablet   No No   Sig: take 1 tablet by mouth once daily   metFORMIN (GLUCOPHAGE-XR) 500 mg 24 hr tablet   No No   Sig: Take 2 tablets (1,000 mg total) by mouth 2 (two) times a day with meals Takes 2 500 mg Tablets = 1000 mg Twice/day   mupirocin (BACTROBAN) 2 % ointment   No No   Sig: Apply topically 3 (three) times a day   Patient not taking: Reported on 10/2/2023   pantoprazole (PROTONIX) 40 mg tablet   No No   Sig: take 1 tablet by mouth once daily   semaglutide, 2 mg/dose, (Ozempic, 2 MG/DOSE,) 8 mg/ mL injection pen   No No   Sig: Inject 0.75 mL (2 mg total) under the skin every 7 days      Facility-Administered Medications: None       Past Medical History:   Diagnosis Date    Asthma     Asthmatic bronchitis with exacerbation     Last Assessed: 2014    Diabetes mellitus (HCC)     GERD (gastroesophageal reflux disease)     History of COVID-19 10/27/21    Mild Symptoms- Fever,Chills and Bodyaches    Hyperlipidemia     Hypertension     Impaired fasting glucose     Last Assessed: 1/15/2015    Pneumonia     Sleep apnea     Mild       Past Surgical History:   Procedure Laterality Date     CERVICAL CERCLAGE      COLONOSCOPY      HAND SURGERY Right     Wrist mass E/O    ND ANTERIOR COLPORRAPHY RPR CYSTOCELE W/CYSTO N/A 6/15/2020    Procedure: ANTERIOR AND POSTERIOR COLPORRHAPHY;  Surgeon: Suimt Powell MD;  Location: [facility];  Service: UroGynecology           ND COLPOPEXY VAGINAL EXTRAPERITONEAL APPROACH N/A 6/15/2020    Procedure: COLPOSUSPENSION VAGINAL EXTRAPERITONEAL(ENPLACE);  Surgeon: Sumit Powell MD;  Location[facility];  Service: UroGynecology           ND CYSTOURETHROSCOPY N/A 6/15/2020    Procedure: CYSTOSCOPY;  Surgeon: Sumit Powell MD;  [facility];  Service: UroGynecology           ND HYSTEROSCOPY BX ENDOMETRIUM&/POLYPC W/WO D&C N/A 6/18/2021    Procedure: DILATATION AND CURETTAGE (D&C) WITH HYSTEROSCOPY;  Surgeon: Ericka Reynoso MD;  [facility];  Service: Gynecology    ND SLING OPERATION STRESS INCONTINENCE N/A 6/15/2020    Procedure: PUBOVAGINAL SLING;  Surgeon: Sumit Powell MD;  [facility];  Service: UroGynecology           THYROIDECTOMY, PARTIAL      TUBAL LIGATION         Family History   Problem Relation Age of Onset    Coronary artery disease Mother     Hypertension Mother     Diabetes Father     Stroke Father         Ischemic    Diabetes Brother     No Known Problems Sister     No Known Problems Daughter     No Known Problems Maternal Grandmother     No Known Problems Maternal Grandfather     No Known Problems Paternal Grandmother     No Known Problems Paternal Grandfather     No Known Problems Son     No Known Problems Maternal Aunt     No Known Problems Maternal Aunt     No Known Problems Maternal Aunt     Cancer Paternal Aunt         unknown type    No Known Problems Paternal Aunt     No Known Problems Paternal Aunt     No Known Problems Paternal Aunt     No Known Problems Paternal Aunt     No Known Problems Paternal Aunt     No Known Problems Paternal Aunt     No Known Problems Paternal Aunt     Cancer Paternal Uncle          unknown type    Cancer Paternal Uncle         unknown type    Breast cancer Cousin         unknown age     I have reviewed and agree with the history as documented.    E-Cigarette/Vaping    E-Cigarette Use Never User      E-Cigarette/Vaping Substances    Nicotine No     THC No     CBD No     Flavoring No     Other No     Unknown No      Social History     Tobacco Use    Smoking status: Never    Smokeless tobacco: Never   Vaping Use    Vaping status: Never Used   Substance Use Topics    Alcohol use: No    Drug use: No        Review of Systems   Constitutional:  Negative for chills and fever.   HENT:  Positive for nosebleeds. Negative for congestion, ear pain and sore throat.    Eyes:  Negative for pain and visual disturbance.   Respiratory:  Negative for cough and shortness of breath.    Cardiovascular:  Negative for chest pain and palpitations.   Gastrointestinal:  Negative for abdominal pain, constipation, diarrhea, nausea and vomiting.   Genitourinary:  Negative for difficulty urinating, dysuria and hematuria.   Musculoskeletal:  Positive for back pain. Negative for arthralgias.        L knee pain, R hand pain    Skin:  Negative for color change and rash.   Neurological:  Positive for light-headedness (mild). Negative for dizziness, seizures, syncope, numbness and headaches.   All other systems reviewed and are negative.      Physical Exam  ED Triage Vitals   Temperature Pulse Respirations Blood Pressure SpO2   12/22/23 2054 12/22/23 2054 12/22/23 2054 12/22/23 2054 12/22/23 2054   97.9 °F (36.6 °C) 73 18 133/84 97 %      Temp Source Heart Rate Source Patient Position - Orthostatic VS BP Location FiO2 (%)   12/22/23 2054 12/22/23 2054 12/22/23 2054 12/22/23 2054 --   Oral Monitor Sitting Right arm       Pain Score       12/22/23 2324       5             Orthostatic Vital Signs  Vitals:    12/22/23 2054 12/22/23 2215   BP: 133/84 138/71   Pulse: 73 72   Patient Position - Orthostatic VS: Sitting         Physical Exam  Vitals and nursing note reviewed.   Constitutional:       General: She is not in acute distress.     Appearance: Normal appearance. She is well-developed. She is not ill-appearing.   HENT:      Head: Normocephalic.      Right Ear: External ear normal.      Left Ear: External ear normal.      Nose: Nasal tenderness present. No septal deviation or laceration.        Mouth/Throat:      Pharynx: Oropharynx is clear. No oropharyngeal exudate or posterior oropharyngeal erythema.   Eyes:      General:         Right eye: No discharge.         Left eye: No discharge.      Extraocular Movements: Extraocular movements intact.      Conjunctiva/sclera: Conjunctivae normal.   Cardiovascular:      Rate and Rhythm: Normal rate and regular rhythm.      Pulses: Normal pulses.      Heart sounds: Normal heart sounds. No murmur heard.  Pulmonary:      Effort: Pulmonary effort is normal. No respiratory distress.      Breath sounds: Normal breath sounds. No wheezing, rhonchi or rales.   Abdominal:      General: Abdomen is flat.      Palpations: Abdomen is soft.      Tenderness: There is no abdominal tenderness. There is no right CVA tenderness, left CVA tenderness, guarding or rebound.   Musculoskeletal:         General: No swelling, tenderness or deformity. Normal range of motion.      Cervical back: Neck supple. No tenderness.      Comments: Superficial abrasion to left knee   Skin:     General: Skin is warm and dry.      Capillary Refill: Capillary refill takes less than 2 seconds.   Neurological:      Mental Status: She is alert.         ED Medications  Medications   ibuprofen (MOTRIN) tablet 400 mg (400 mg Oral Given 12/22/23 7935)       Diagnostic Studies  Results Reviewed       None                   CT head without contrast   Final Result by Felix Lehman MD (12/22 2476)      No acute intracranial abnormality.                  Workstation performed: [id number]         CT facial bones without contrast   Final  Result by Felix Lehman MD (12/22 2320)      Minimally displaced nasal fracture               Workstation performed: [id number]               Procedures  Procedures      ED Course                                       Medical Decision Making  58F M, hypertension, hyperlipidemia the presents to the emergency department after striking her face on a wall earlier this evening.  Patient also fell onto her back a couple of days ago.  On physical exam, patient has no back tenderness, no left knee tenderness, and no tenderness of the right hand.  She has a small abrasion to the left knee.  She has obvious swelling, bruising, and significant tenderness to the nasal bridge.  Patient has otherwise been well recently patient adamantly denies any loss of consciousness, lightheadedness, and reports that this was a strictly mechanical fall.  Will defer additional laboratory evaluation at this time.  CT scan of the head reveals no acute intracranial abnormalities and CT facial bones revealed minimally displaced nasal fracture.  These findings are discussed with the patient.  She has no septal hematoma.  Patient provided with information for [facility] and is recommended that she call make an appointment to be seen for further evaluation and treatment.    In the absence of additional concerning findings on physical exam or imaging patient is appropriate for discharge at this time.  Additionally, patient provided with informational handout that discusses additional reasons to return to the emergency department.  Return precautions are discussed with the patient and they demonstrate understanding of the plan.  Their questions are all answered to their satisfaction and the patient is discharged.      Amount and/or Complexity of Data Reviewed  Radiology: ordered.    Risk  Prescription drug management.          Disposition  Final diagnoses:   Fall, initial encounter   Nasal bone fracture     Time reflects when diagnosis was documented  in both MDM as applicable and the Disposition within this note       Time User Action Codes Description Comment    12/22/2023 11:24 PM Giorgio Hicks [W19.XXXA] Fall, initial encounter     12/22/2023 11:24 PM Giorgio Hicks [S02.2XXA] Nasal bone fracture           ED Disposition       ED Disposition   Discharge    Condition   Stable    Date/Time   Fri Dec 22, 2023 11:26 PM    Comment   Martha Rausch discharge to home/self care.                   Follow-up Information       Follow up With Specialties Details Why Contact Info    Jolene Quinn MD Otolaryngology Schedule an appointment as soon as possible for a visit   [address]  [address]  [facility] [address] [phone]              Discharge Medication List as of 12/22/2023 11:30 PM        CONTINUE these medications which have NOT CHANGED    Details   albuterol (Ventolin HFA) 90 mcg/act inhaler Inhale 2 puffs every 4 (four) hours as needed for wheezing or shortness of breath, Starting Thu 11/30/2023, Normal      ascorbic acid (VITAMIN C) 500 mg tablet Take 1 tablet (500 mg total) by mouth daily, Starting Thu 8/25/2022, Normal      atorvastatin (LIPITOR) 20 mg tablet take 1 tablet by mouth once daily, Normal      Bioflavonoid Products (VITAMIN C PLUS PO) Take by mouth, Historical Med      cholecalciferol (VITAMIN D3) 1,000 units tablet take 1 tablet by mouth once daily, Normal      cloNIDine (CATAPRES) 0.1 mg tablet take 1 tablet by mouth every 12 hours, Starting Fri 7/21/2023, Normal      Continuous Blood Gluc Sensor (FreeStyle Phil 14 Day Sensor) MISC Use 1 each in the morning, Starting Tue 4/25/2023, Normal      diltiazem (DILACOR XR) 120 MG 24 hr capsule take 1 capsule by mouth every morning, Normal      Empagliflozin (Jardiance) 10 MG TABS tablet Take 1 tablet (10 mg total) by mouth every morning, Starting Mon 10/2/2023, Normal      ferrous gluconate 324 (37.5 Fe) mg Take 1 tablet (324 mg total) by mouth 2 (two) times a day before  meals, Starting Mon 10/2/2023, Normal      fluticasone (FLONASE) 50 mcg/act nasal spray 1 spray into each nostril daily, Starting Thu 12/9/2021, Normal      lisinopril-hydrochlorothiazide (PRINZIDE,ZESTORETIC) 20-25 MG per tablet take 1 tablet by mouth once daily, Normal      metFORMIN (GLUCOPHAGE-XR) 500 mg 24 hr tablet Take 2 tablets (1,000 mg total) by mouth 2 (two) times a day with meals Takes 2 500 mg Tablets = 1000 mg Twice/day, Starting Mon 10/2/2023, Until Sat 3/30/2024, Normal      mupirocin (BACTROBAN) 2 % ointment Apply topically 3 (three) times a day, Starting Tue 5/23/2023, Normal      pantoprazole (PROTONIX) 40 mg tablet take 1 tablet by mouth once daily, Normal      semaglutide, 2 mg/dose, (Ozempic, 2 MG/DOSE,) 8 mg/ mL injection pen Inject 0.75 mL (2 mg total) under the skin every 7 days, Starting Fri 11/17/2023, Normal           No discharge procedures on file.    PDMP Review         Value Time User    PDMP Reviewed  Yes 11/4/2020 10:07 AM Shauna Osorio MD             ED Provider  Attending physically available and evaluated Martha Rausch. I managed the patient along with the ED Attending.    Electronically Signed by           Giorgio Hicks,   12/23/23 9969

## 2023-12-23 NOTE — DISCHARGE INSTRUCTIONS
Read the attached information for more details and reasons to return to the emergency department

## 2023-12-23 NOTE — ED ATTENDING ATTESTATION
12/22/2023  I, Ender Alva MD, saw and evaluated the patient. I have discussed the patient with the resident/non-physician practitioner and agree with the resident's/non-physician practitioner's findings, Plan of Care, and MDM as documented in the resident's/non-physician practitioner's note, except where noted. All available labs and Radiology studies were reviewed.  I was present for key portions of any procedure(s) performed by the resident/non-physician practitioner and I was immediately available to provide assistance.       At this point I agree with the current assessment done in the Emergency Department.  I have conducted an independent evaluation of this patient a history and physical is as follows:    58-year-old female presented for evaluation after a trip and fall striking her face on concrete wall.  No loss of consciousness, headache, nausea, vomiting, visual changes.  Has nasal pain and swelling.  No epistaxis.    ED Course     CT head unremarkable.  CT facial bones notable for minimally displaced right nasal bone fracture.  Discussed results with patient.  Referring to ENT for further management as needed.    Critical Care Time  Procedures

## 2023-12-24 DIAGNOSIS — I10 ESSENTIAL HYPERTENSION: ICD-10-CM

## 2023-12-25 DIAGNOSIS — E11.65 TYPE 2 DIABETES MELLITUS WITH HYPERGLYCEMIA, WITHOUT LONG-TERM CURRENT USE OF INSULIN (HCC): ICD-10-CM

## 2023-12-26 RX ORDER — EMPAGLIFLOZIN 10 MG/1
10 TABLET, FILM COATED ORAL EVERY MORNING
Qty: 30 TABLET | Refills: 2 | Status: SHIPPED | OUTPATIENT
Start: 2023-12-26

## 2023-12-27 DIAGNOSIS — R10.13 DYSPEPSIA: ICD-10-CM

## 2023-12-27 RX ORDER — DILTIAZEM HYDROCHLORIDE 120 MG/1
120 CAPSULE, EXTENDED RELEASE ORAL EVERY MORNING
Qty: 30 CAPSULE | Refills: 3 | Status: SHIPPED | OUTPATIENT
Start: 2023-12-27

## 2023-12-28 ENCOUNTER — OFFICE VISIT (OUTPATIENT)
Dept: URGENT CARE | Age: 58
End: 2023-12-28
Payer: COMMERCIAL

## 2023-12-28 VITALS
RESPIRATION RATE: 18 BRPM | OXYGEN SATURATION: 99 % | DIASTOLIC BLOOD PRESSURE: 80 MMHG | HEART RATE: 81 BPM | SYSTOLIC BLOOD PRESSURE: 132 MMHG | TEMPERATURE: 97.6 F

## 2023-12-28 DIAGNOSIS — J30.89 ALLERGIC RHINITIS DUE TO OTHER ALLERGIC TRIGGER, UNSPECIFIED SEASONALITY: Primary | ICD-10-CM

## 2023-12-28 PROCEDURE — 99213 OFFICE O/P EST LOW 20 MIN: CPT | Performed by: STUDENT IN AN ORGANIZED HEALTH CARE EDUCATION/TRAINING PROGRAM

## 2023-12-28 RX ORDER — CETIRIZINE HYDROCHLORIDE 10 MG/1
10 TABLET ORAL DAILY
Qty: 10 TABLET | Refills: 0 | Status: SHIPPED | OUTPATIENT
Start: 2023-12-28

## 2023-12-28 RX ORDER — PANTOPRAZOLE SODIUM 40 MG/1
TABLET, DELAYED RELEASE ORAL
Qty: 90 TABLET | Refills: 1 | Status: SHIPPED | OUTPATIENT
Start: 2023-12-28

## 2023-12-28 NOTE — PROGRESS NOTES
[facility]        NAME: Martha Rausch is a 58 y.o. female  : 1965    MRN: [id number]  DATE: 2023  TIME: 11:43 AM    Assessment and Orders   Allergic rhinitis due to other allergic trigger, unspecified seasonality [J30.89]  1. Allergic rhinitis due to other allergic trigger, unspecified seasonality  cetirizine (ZyrTEC) 10 mg tablet            Plan and Discussion      Patient is status post fall on  where CT of the head confirmed minimally displaced nasal bone fracture.  CT of the head was negative for acute bleeds.  Today patient having persistent nasal drainage starting 3 days ago.  Course, this is concerning for CSF leak.  However, drainage was not brisk enough to obtain a sample for glucose testing.  Patient does not have any other concerning symptoms such as neurological deficits, hemotympanums, bruising of the ethmoid process, or paralysis of extraocular movements.  Patient denies headaches and blurry vision.  At this time, allergic rhinitis is the more likely diagnosis.  Will treat with oral Zyrtec.  Patient to follow-up closely with ENT specialist on .  Very strict ED precautions discussed including headaches, changes in neurological function, changes in vision, or increased bleeding and drainage from the nose suddenly.      Discussed ED precautions including (but not limited to)  Difficultly breathing or shortness of breath  Chest pain  Acutely worsening symptoms.     Risks and benefits discussed. Patient understands and agrees with the plan.     Follow up with PCP.     Chief Complaint     Chief Complaint   Patient presents with    Fall     Patient states that on Friday she fell and hit her face. She has a nasal fracture and was referred to ENT. Patient states that she got an apt with ENT next Tuesday, but she doesn't want to wait that long. She is unable to blow her nose and is having trouble breathing minor at night.          History of Present Illness        Fall        Patient is a 58-year-old female who presents with nasal congestion patient was seen in the emergency room on 1222 status post fall where she hit her face on a wall.  CT of the facial bones revealed minimally displaced fracture of the nasal bone.  Patient was referred to ENT and she has an appointment with them on January 2.  CT of the head did not show acute intracranial abnormality.  Patient has been having continuous clear discharge from the nose starting Tuesday.  She took a Benadryl which did seem to help slightly.  She states it is very difficult to breathe through her nose because of the swelling and the discharge.  Discharge is nonbloody and clear.  She is describes it as continuous.  She denies headaches and blurry vision.  She states that she has had worsening swelling and bruising under the right eye.  No difficulty swallowing.  She states that the congestion and discharge seem to be worse at night when she is lying down.  She states is very difficult for her to breathe through her nose.  She is denies syncope and near syncope.    Review of Systems   Review of Systems  As stated above    Current Medications       Current Outpatient Medications:     cetirizine (ZyrTEC) 10 mg tablet, Take 1 tablet (10 mg total) by mouth daily, Disp: 10 tablet, Rfl: 0    albuterol (Ventolin HFA) 90 mcg/act inhaler, Inhale 2 puffs every 4 (four) hours as needed for wheezing or shortness of breath, Disp: 8 g, Rfl: 0    ascorbic acid (VITAMIN C) 500 mg tablet, Take 1 tablet (500 mg total) by mouth daily, Disp: 90 tablet, Rfl: 1    atorvastatin (LIPITOR) 20 mg tablet, take 1 tablet by mouth once daily, Disp: 30 tablet, Rfl: 3    Bioflavonoid Products (VITAMIN C PLUS PO), Take by mouth, Disp: , Rfl:     cholecalciferol (VITAMIN D3) 1,000 units tablet, take 1 tablet by mouth once daily, Disp: 90 tablet, Rfl: 0    cloNIDine (CATAPRES) 0.1 mg tablet, take 1 tablet by mouth every 12 hours, Disp: 60 tablet, Rfl: 5     Continuous Blood Gluc Sensor (FreeStyle Phil 14 Day Sensor) MISC, Use 1 each in the morning (Patient not taking: Reported on 10/2/2023), Disp: 1 each, Rfl: 0    Dilt- MG 24 hr capsule, take 1 capsule by mouth every morning, Disp: 30 capsule, Rfl: 3    ferrous gluconate 324 (37.5 Fe) mg, Take 1 tablet (324 mg total) by mouth 2 (two) times a day before meals, Disp: 60 tablet, Rfl: 3    fluticasone (FLONASE) 50 mcg/act nasal spray, 1 spray into each nostril daily, Disp: 11.1 mL, Rfl: 1    Jardiance 10 MG TABS tablet, take 1 tablet by mouth every morning, Disp: 30 tablet, Rfl: 2    lisinopril-hydrochlorothiazide (PRINZIDE,ZESTORETIC) 20-25 MG per tablet, take 1 tablet by mouth once daily, Disp: 90 tablet, Rfl: 0    metFORMIN (GLUCOPHAGE-XR) 500 mg 24 hr tablet, Take 2 tablets (1,000 mg total) by mouth 2 (two) times a day with meals Takes 2 500 mg Tablets = 1000 mg Twice/day, Disp: 360 tablet, Rfl: 1    mupirocin (BACTROBAN) 2 % ointment, Apply topically 3 (three) times a day (Patient not taking: Reported on 10/2/2023), Disp: 22 g, Rfl: 0    pantoprazole (PROTONIX) 40 mg tablet, take 1 tablet by mouth once daily, Disp: 90 tablet, Rfl: 1    semaglutide, 2 mg/dose, (Ozempic, 2 MG/DOSE,) 8 mg/ mL injection pen, Inject 0.75 mL (2 mg total) under the skin every 7 days, Disp: 9 mL, Rfl: 1    Current Allergies     Allergies as of 12/28/2023 - Reviewed 12/28/2023   Allergen Reaction Noted    Contrast [iodinated contrast media] Vomiting 12/22/2023    Levaquin [levofloxacin] Rash 06/15/2021            The following portions of the patient's history were reviewed and updated as appropriate: allergies, current medications, past family history, past medical history, past social history, past surgical history and problem list.     Past Medical History:   Diagnosis Date    Asthma     Asthmatic bronchitis with exacerbation     Last Assessed: 8/7/2014    Diabetes mellitus (HCC)     GERD (gastroesophageal reflux disease)      History of COVID-19 10/27/21    Mild Symptoms- Fever,Chills and Bodyaches    Hyperlipidemia     Hypertension     Impaired fasting glucose     Last Assessed: 1/15/2015    Pneumonia     Sleep apnea     Mild       Past Surgical History:   Procedure Laterality Date    CERVICAL CERCLAGE      COLONOSCOPY      HAND SURGERY Right     Wrist mass E/O    MA ANTERIOR COLPORRAPHY RPR CYSTOCELE W/CYSTO N/A 6/15/2020    Procedure: ANTERIOR AND POSTERIOR COLPORRHAPHY;  Surgeon: Sumit Powell MD;  Location: [facility];  Service: UroGynecology           MA COLPOPEXY VAGINAL EXTRAPERITONEAL APPROACH N/A 6/15/2020    Procedure: COLPOSUSPENSION VAGINAL EXTRAPERITONEAL(ENPLACE);  Surgeon: Sumit Powell MD;  Location: [facility];  Service: UroGynecology           MA CYSTOURETHROSCOPY N/A 6/15/2020    Procedure: CYSTOSCOPY;  Surgeon: Sumit Powell MD;  Location[facility];  Service: UroGynecology           MA HYSTEROSCOPY BX ENDOMETRIUM&/POLYPC W/WO D&C N/A 6/18/2021    Procedure: DILATATION AND CURETTAGE (D&C) WITH HYSTEROSCOPY;  Surgeon: Ericka Reynoso MD;  [facility];  Service: Gynecology    MA SLING OPERATION STRESS INCONTINENCE N/A 6/15/2020    Procedure: PUBOVAGINAL SLING;  Surgeon: Sumit Powell MD;  Location: [facility];  Service: UroGynecology           THYROIDECTOMY, PARTIAL      TUBAL LIGATION         Family History   Problem Relation Age of Onset    Coronary artery disease Mother     Hypertension Mother     Diabetes Father     Stroke Father         Ischemic    Diabetes Brother     No Known Problems Sister     No Known Problems Daughter     No Known Problems Maternal Grandmother     No Known Problems Maternal Grandfather     No Known Problems Paternal Grandmother     No Known Problems Paternal Grandfather     No Known Problems Son     No Known Problems Maternal Aunt     No Known Problems Maternal Aunt     No Known Problems Maternal Aunt     Cancer Paternal Aunt         unknown type    No Known  Problems Paternal Aunt     No Known Problems Paternal Aunt     No Known Problems Paternal Aunt     No Known Problems Paternal Aunt     No Known Problems Paternal Aunt     No Known Problems Paternal Aunt     No Known Problems Paternal Aunt     Cancer Paternal Uncle         unknown type    Cancer Paternal Uncle         unknown type    Breast cancer Cousin         unknown age         Medications have been verified.        Objective   /80   Pulse 81   Temp 97.6 °F (36.4 °C)   Resp 18   LMP 12/12/2019 (Exact Date)   SpO2 99%   Patient's last menstrual period was 12/12/2019 (exact date).       Physical Exam     Physical Exam  Constitutional:       General: She is not in acute distress.  HENT:      Head: No Moore's sign or abrasion.        Right Ear: Tympanic membrane normal. No hemotympanum.      Left Ear: Tympanic membrane normal. No hemotympanum.      Nose:      Comments: Negative for septal hematoma.  Clear drainage noted minimal.  Eyes:      General: No visual field deficit.  Neurological:      Mental Status: She is alert and oriented to person, place, and time.      Cranial Nerves: No cranial nerve deficit, dysarthria or facial asymmetry.      Sensory: No sensory deficit.      Motor: No weakness or tremor.      Coordination: Coordination normal.      Gait: Gait normal.               Ruchi Banda DO

## 2023-12-29 NOTE — TELEPHONE ENCOUNTER
I just responded to her via the [employer] message to tell her I sent Rx for lisinopril 10 mg daily  I want to avoid HCTZ because she ends up with hypokalemia  I asked her to schedule a BP f/u appt in 3-4 weeks  She should continue to do home BPs  Jerrell

## 2024-01-17 DIAGNOSIS — I10 ESSENTIAL HYPERTENSION: ICD-10-CM

## 2024-01-17 DIAGNOSIS — I10 HTN (HYPERTENSION), BENIGN: ICD-10-CM

## 2024-01-17 DIAGNOSIS — E78.5 HYPERLIPIDEMIA, UNSPECIFIED HYPERLIPIDEMIA TYPE: ICD-10-CM

## 2024-01-18 RX ORDER — ATORVASTATIN CALCIUM 20 MG/1
TABLET, FILM COATED ORAL
Qty: 30 TABLET | Refills: 0 | Status: SHIPPED | OUTPATIENT
Start: 2024-01-18

## 2024-01-18 RX ORDER — CLONIDINE HYDROCHLORIDE 0.1 MG/1
0.1 TABLET ORAL EVERY 12 HOURS
Qty: 60 TABLET | Refills: 0 | Status: SHIPPED | OUTPATIENT
Start: 2024-01-18

## 2024-01-18 NOTE — TELEPHONE ENCOUNTER
Please call pt to follow up in the office to review chronic health problems and medications. Thank you!

## 2024-01-29 DIAGNOSIS — E11.65 TYPE 2 DIABETES MELLITUS WITH HYPERGLYCEMIA, WITHOUT LONG-TERM CURRENT USE OF INSULIN (HCC): ICD-10-CM

## 2024-02-06 ENCOUNTER — OFFICE VISIT (OUTPATIENT)
Dept: FAMILY MEDICINE CLINIC | Facility: CLINIC | Age: 59
End: 2024-02-06

## 2024-02-06 VITALS
WEIGHT: 165 LBS | BODY MASS INDEX: 30.36 KG/M2 | HEIGHT: 62 IN | OXYGEN SATURATION: 99 % | SYSTOLIC BLOOD PRESSURE: 110 MMHG | HEART RATE: 69 BPM | RESPIRATION RATE: 18 BRPM | DIASTOLIC BLOOD PRESSURE: 73 MMHG | TEMPERATURE: 97.3 F

## 2024-02-06 DIAGNOSIS — G47.33 OSA ON CPAP: ICD-10-CM

## 2024-02-06 DIAGNOSIS — E55.9 VITAMIN D INSUFFICIENCY: ICD-10-CM

## 2024-02-06 DIAGNOSIS — E11.65 TYPE 2 DIABETES MELLITUS WITH HYPERGLYCEMIA, WITHOUT LONG-TERM CURRENT USE OF INSULIN (HCC): Primary | ICD-10-CM

## 2024-02-06 DIAGNOSIS — K21.9 GASTROESOPHAGEAL REFLUX DISEASE, UNSPECIFIED WHETHER ESOPHAGITIS PRESENT: ICD-10-CM

## 2024-02-06 DIAGNOSIS — I10 ESSENTIAL HYPERTENSION: ICD-10-CM

## 2024-02-06 DIAGNOSIS — R10.13 DYSPEPSIA: ICD-10-CM

## 2024-02-06 DIAGNOSIS — E78.2 MIXED HYPERLIPIDEMIA: ICD-10-CM

## 2024-02-06 LAB — SL AMB POCT HEMOGLOBIN AIC: 5.6 (ref ?–6.5)

## 2024-02-06 PROCEDURE — 83036 HEMOGLOBIN GLYCOSYLATED A1C: CPT | Performed by: FAMILY MEDICINE

## 2024-02-06 PROCEDURE — 99214 OFFICE O/P EST MOD 30 MIN: CPT | Performed by: FAMILY MEDICINE

## 2024-02-06 RX ORDER — MELATONIN
1000 DAILY
Qty: 90 TABLET | Refills: 0 | Status: SHIPPED | OUTPATIENT
Start: 2024-02-06

## 2024-02-06 RX ORDER — PANTOPRAZOLE SODIUM 40 MG/1
40 TABLET, DELAYED RELEASE ORAL DAILY
Qty: 90 TABLET | Refills: 1 | Status: SHIPPED | OUTPATIENT
Start: 2024-02-06

## 2024-02-06 NOTE — PROGRESS NOTES
Name: Martha Rausch      : 1965      MRN: [id number]  Encounter Provider: Iris Purcell DO  Encounter Date: 2024   Encounter department: [facility]    Assessment & Plan     1. Type 2 diabetes mellitus with hyperglycemia, without long-term current use of insulin (HCC)  Assessment & Plan:    Lab Results   Component Value Date    HGBA1C 5.6 2024     Well controlled on home Metformin XR 1000 mg BID, Ozempic 2 mg weekly, Jardiance 10 mg daily  - Goal A1c < 7  - Complications: none  - Statin: Lipitor 20 mg daily  - ACE/ARB: Lisinopril 20 mg daily  - Follows with Endo  - Immunizations:  Flu: Defer to next visit  PCV-20: Defer to next visit  - Exam unremarkable    Plan:  - C/w home regimen for now  - Counseling  -  on lifestyle improvement including maintain healthy weight (goal BMI < 30), healthy diet, and exercise program  - Follow up with Endocrinologist  - RTC in 3 months for A1C recheck    Lab Results   Component Value Date/Time    HGBA1C 5.6 2024 02:00 PM    HGBA1C 6.4 (H) 2023 10:10 AM    HGBA1C 7.2 (A) 2023 11:02 AM    HGBA1C 7.5 (A) 2022 02:14 PM    HGBA1C 7.3 (H) 10/15/2020 09:40 AM    HGBA1C 6.4 (H) 10/16/2018 09:06 AM    HGBA1C 6.8 (H) 2015 06:51 AM    HGBA1C 6.1 (H) 2015 10:10 AM    HGBA1C 6.7 (H) 2015 06:30 AM         Lab Results   Component Value Date/Time    BUN 18 2023 07:44 AM    BUN 21 2015 06:51 AM    CREATININE 0.70 2023 07:44 AM    CREATININE 0.56 (L) 2015 06:51 AM         Lab Results   Component Value Date/Time    CREATININEUR 157.7 2023 10:10 AM    CREATININEUR 157.0 2015 10:11 AM    LABMICR 14.8 2023 10:10 AM    LABMICR 14 2015 10:11 AM    MICROALBCRE 9 2023 10:10 AM    MICROALBCRE 9 2015 10:11 AM         Orders:  -     POCT hemoglobin A1c    2. Essential hypertension  Assessment & Plan:  Goal bp <140/90, pt's bp 110/73 at  goal  - c/w clonidine 0.1 mg daily and lisinopril-HCTZ 20-25 mg daily   - Advised patient on symptoms of hypotension & severe HTN  - Limit salt-intake & caffeine in diet, minimize stress level  - Weight reduction efforts via improved diet & increased exercise recommend 150 minutes a week  - DASH diet handout given via AVS  - last urine microalbumin more than 1 year ago - labs ordered  -Discussed importance of treating HTN to prevent ASCVD, CVA        3. Mixed hyperlipidemia  Assessment & Plan:  C/w lipitor 20 mg daily      4. Dyspepsia  -     pantoprazole (PROTONIX) 40 mg tablet; Take 1 tablet (40 mg total) by mouth daily    5. Vitamin D insufficiency  -     cholecalciferol (VITAMIN D3) 1,000 units tablet; Take 1 tablet (1,000 Units total) by mouth daily    6. Gastroesophageal reflux disease, unspecified whether esophagitis present  Assessment & Plan:  - Patient has been well-controlled on Pantoprazole  - Ran out of pantoprazole a few days ago, and has felt reflux symptoms returning, requests refill    Plan  - refilled pantoprazole      7. KIM on CPAP  Assessment & Plan:  Subjectively resolved. Pt lost significant weight and is not symptomatic.              Darian Thomas is a 58 year old woman presenting for follow-up on chronic health issues.    She has no concerns at this time.    Asthma: Hasn't used inhaler in a few months. Only needs it when sick.    GERD: well-controlled on pantoprazole. No long-term cough, no night-time reflux. She ran out a of pantoprazole a few days ago and has felt symptoms of reflux returning.     BP: Doesn't check at home. Has recently lost weight, lost over 40 pounds on ozempic. No symptoms of blood pressure being too low including light-headedness, nausea, vision changes.     Diabetes: Follows with endocrine.  Well-controlled. Medication managed by endocrine, on metformin, ozempic, jardiance. A1c today 5.6    Iron deficiency: Stopped taking iron supplements due to  constipation one month ago. Was taking them for 3-4 months. Was taking them with food, but appetite decreased with ozempic. Constipation stopped once she stopped taking the iron.     KIM on CPAP: Never got the CPAP, too expensive. Sleep doctor told her her sleep apnea was mild. She is not having daytime sleepiness, she wakes up feeling rested most days, partner has not heard her snoring or waking up gasping for air. She has lost a lot of weight recently and would not be interested in trying CPAP at this time.         Review of Systems   Constitutional:  Negative for activity change, fatigue and fever.   Eyes:  Negative for visual disturbance.   Respiratory:  Negative for shortness of breath.    Cardiovascular:  Negative for chest pain and leg swelling.   Gastrointestinal:  Negative for constipation, diarrhea, nausea and vomiting.   Neurological:  Negative for dizziness, syncope and light-headedness.       Current Outpatient Medications on File Prior to Visit   Medication Sig    albuterol (Ventolin HFA) 90 mcg/act inhaler Inhale 2 puffs every 4 (four) hours as needed for wheezing or shortness of breath    ascorbic acid (VITAMIN C) 500 mg tablet Take 1 tablet (500 mg total) by mouth daily    atorvastatin (LIPITOR) 20 mg tablet take 1 tablet by mouth once daily    Bioflavonoid Products (VITAMIN C PLUS PO) Take by mouth    cloNIDine (CATAPRES) 0.1 mg tablet take 1 tablet by mouth every 12 hours    Dilt- MG 24 hr capsule take 1 capsule by mouth every morning    ferrous gluconate 324 (37.5 Fe) mg Take 1 tablet (324 mg total) by mouth 2 (two) times a day before meals    fluticasone (FLONASE) 50 mcg/act nasal spray 1 spray into each nostril daily    Jardiance 10 MG TABS tablet take 1 tablet by mouth every morning    lisinopril-hydrochlorothiazide (PRINZIDE,ZESTORETIC) 20-25 MG per tablet take 1 tablet by mouth once daily    metFORMIN (GLUCOPHAGE-XR) 500 mg 24 hr tablet Take 2 tablets (1,000 mg total) by mouth 2 (two)  "times a day with meals Takes 2 500 mg Tablets = 1000 mg Twice/day    semaglutide, 2 mg/dose, (Ozempic, 2 MG/DOSE,) 8 mg/ mL injection pen Inject 0.75 mL (2 mg total) under the skin every 7 days    [DISCONTINUED] pantoprazole (PROTONIX) 40 mg tablet take 1 tablet by mouth once daily    [DISCONTINUED] cetirizine (ZyrTEC) 10 mg tablet Take 1 tablet (10 mg total) by mouth daily    [DISCONTINUED] cholecalciferol (VITAMIN D3) 1,000 units tablet take 1 tablet by mouth once daily (Patient not taking: Reported on 2/6/2024)    [DISCONTINUED] Continuous Blood Gluc Sensor (YaDataStyle Phil 14 Day Sensor) MISC Use 1 each in the morning (Patient not taking: Reported on 10/2/2023)    [DISCONTINUED] mupirocin (BACTROBAN) 2 % ointment Apply topically 3 (three) times a day (Patient not taking: Reported on 10/2/2023)       Objective     /73 (BP Location: Left arm, Patient Position: Sitting, Cuff Size: Standard)   Pulse 69   Temp (!) 97.3 °F (36.3 °C)   Resp 18   Ht 5' 2\" (1.575 m)   Wt 74.8 kg (165 lb)   LMP 12/12/2019 (Exact Date)   SpO2 99%   BMI 30.18 kg/m²     Physical Exam  Vitals and nursing note reviewed.   Constitutional:       General: She is not in acute distress.     Appearance: Normal appearance.   HENT:      Head: Normocephalic and atraumatic.      Right Ear: External ear normal.      Left Ear: External ear normal.      Nose: Nose normal.      Mouth/Throat:      Mouth: Mucous membranes are moist.   Eyes:      General: No scleral icterus.     Extraocular Movements: Extraocular movements intact.      Pupils: Pupils are equal, round, and reactive to light.   Cardiovascular:      Rate and Rhythm: Normal rate and regular rhythm.      Pulses: Normal pulses.      Heart sounds: Normal heart sounds. No murmur heard.  Pulmonary:      Effort: Pulmonary effort is normal.      Breath sounds: Normal breath sounds.   Abdominal:      General: Abdomen is flat.      Palpations: Abdomen is soft.   Musculoskeletal:      Cervical " back: Normal range of motion and neck supple.      Right lower leg: No edema.      Left lower leg: No edema.   Skin:     General: Skin is warm and dry.   Neurological:      General: No focal deficit present.      Mental Status: She is alert.       Iris Purcell DO

## 2024-02-06 NOTE — ASSESSMENT & PLAN NOTE
- Patient has been well-controlled on Pantoprazole  - Ran out of pantoprazole a few days ago, and has felt reflux symptoms returning, requests refill    Plan  - refilled pantoprazole

## 2024-02-07 NOTE — ASSESSMENT & PLAN NOTE
Lab Results   Component Value Date    HGBA1C 5.6 02/06/2024     Well controlled on home Metformin XR 1000 mg BID, Ozempic 2 mg weekly, Jardiance 10 mg daily  - Goal A1c < 7  - Complications: none  - Statin: Lipitor 20 mg daily  - ACE/ARB: Lisinopril 20 mg daily  - Follows with Endo  - Immunizations:  Flu: Defer to next visit  PCV-20: Defer to next visit  - Exam unremarkable    Plan:  - C/w home regimen for now  - Counseling  -  on lifestyle improvement including maintain healthy weight (goal BMI < 30), healthy diet, and exercise program  - Follow up with Endocrinologist  - RTC in 3 months for A1C recheck    Lab Results   Component Value Date/Time    HGBA1C 5.6 02/06/2024 02:00 PM    HGBA1C 6.4 (H) 09/30/2023 10:10 AM    HGBA1C 7.2 (A) 04/25/2023 11:02 AM    HGBA1C 7.5 (A) 02/07/2022 02:14 PM    HGBA1C 7.3 (H) 10/15/2020 09:40 AM    HGBA1C 6.4 (H) 10/16/2018 09:06 AM    HGBA1C 6.8 (H) 11/05/2015 06:51 AM    HGBA1C 6.1 (H) 08/12/2015 10:10 AM    HGBA1C 6.7 (H) 07/08/2015 06:30 AM         Lab Results   Component Value Date/Time    BUN 18 05/22/2023 07:44 AM    BUN 21 11/05/2015 06:51 AM    CREATININE 0.70 05/22/2023 07:44 AM    CREATININE 0.56 (L) 11/05/2015 06:51 AM         Lab Results   Component Value Date/Time    CREATININEUR 157.7 09/30/2023 10:10 AM    CREATININEUR 157.0 08/12/2015 10:11 AM    LABMICR 14.8 09/30/2023 10:10 AM    LABMICR 14 08/12/2015 10:11 AM    MICROALBCRE 9 09/30/2023 10:10 AM    MICROALBCRE 9 08/12/2015 10:11 AM

## 2024-02-07 NOTE — ASSESSMENT & PLAN NOTE
Goal bp <140/90, pt's bp 110/73 at goal  - c/w clonidine 0.1 mg daily and lisinopril-HCTZ 20-25 mg daily   - Advised patient on symptoms of hypotension & severe HTN  - Limit salt-intake & caffeine in diet, minimize stress level  - Weight reduction efforts via improved diet & increased exercise recommend 150 minutes a week  - DASH diet handout given via AVS  - last urine microalbumin more than 1 year ago - labs ordered  -Discussed importance of treating HTN to prevent ASCVD, CVA

## 2024-02-17 DIAGNOSIS — I10 ESSENTIAL HYPERTENSION: ICD-10-CM

## 2024-02-19 RX ORDER — LISINOPRIL AND HYDROCHLOROTHIAZIDE 25; 20 MG/1; MG/1
TABLET ORAL
Qty: 90 TABLET | Refills: 0 | Status: SHIPPED | OUTPATIENT
Start: 2024-02-19

## 2024-02-26 DIAGNOSIS — I10 HTN (HYPERTENSION), BENIGN: ICD-10-CM

## 2024-02-26 DIAGNOSIS — I10 ESSENTIAL HYPERTENSION: ICD-10-CM

## 2024-02-26 DIAGNOSIS — E78.5 HYPERLIPIDEMIA, UNSPECIFIED HYPERLIPIDEMIA TYPE: ICD-10-CM

## 2024-02-26 RX ORDER — ATORVASTATIN CALCIUM 20 MG/1
TABLET, FILM COATED ORAL
Qty: 30 TABLET | Refills: 0 | Status: SHIPPED | OUTPATIENT
Start: 2024-02-26

## 2024-02-26 RX ORDER — CLONIDINE HYDROCHLORIDE 0.1 MG/1
0.1 TABLET ORAL EVERY 12 HOURS
Qty: 60 TABLET | Refills: 0 | Status: SHIPPED | OUTPATIENT
Start: 2024-02-26

## 2024-03-04 DIAGNOSIS — I10 ESSENTIAL HYPERTENSION: ICD-10-CM

## 2024-03-06 RX ORDER — LISINOPRIL AND HYDROCHLOROTHIAZIDE 25; 20 MG/1; MG/1
TABLET ORAL
Qty: 90 TABLET | Refills: 0 | Status: SHIPPED | OUTPATIENT
Start: 2024-03-06

## 2024-03-21 DIAGNOSIS — E11.65 TYPE 2 DIABETES MELLITUS WITH HYPERGLYCEMIA, WITHOUT LONG-TERM CURRENT USE OF INSULIN (HCC): ICD-10-CM

## 2024-03-22 RX ORDER — METFORMIN HYDROCHLORIDE 500 MG/1
1000 TABLET, EXTENDED RELEASE ORAL 2 TIMES DAILY WITH MEALS
Qty: 360 TABLET | Refills: 0 | Status: SHIPPED | OUTPATIENT
Start: 2024-03-22 | End: 2024-09-18

## 2024-03-28 ENCOUNTER — OFFICE VISIT (OUTPATIENT)
Dept: FAMILY MEDICINE CLINIC | Facility: CLINIC | Age: 59
End: 2024-03-28

## 2024-03-28 VITALS
HEIGHT: 63 IN | BODY MASS INDEX: 29.41 KG/M2 | SYSTOLIC BLOOD PRESSURE: 114 MMHG | HEART RATE: 61 BPM | WEIGHT: 166 LBS | OXYGEN SATURATION: 98 % | TEMPERATURE: 97.9 F | DIASTOLIC BLOOD PRESSURE: 79 MMHG | RESPIRATION RATE: 18 BRPM

## 2024-03-28 DIAGNOSIS — Z13.6 SCREENING FOR CARDIOVASCULAR CONDITION: ICD-10-CM

## 2024-03-28 DIAGNOSIS — D50.9 IRON DEFICIENCY ANEMIA, UNSPECIFIED IRON DEFICIENCY ANEMIA TYPE: ICD-10-CM

## 2024-03-28 DIAGNOSIS — Z12.31 SCREENING MAMMOGRAM FOR BREAST CANCER: ICD-10-CM

## 2024-03-28 DIAGNOSIS — Z00.00 ANNUAL PHYSICAL EXAM: Primary | ICD-10-CM

## 2024-03-28 DIAGNOSIS — E55.9 VITAMIN D DEFICIENCY: ICD-10-CM

## 2024-03-28 DIAGNOSIS — E11.65 TYPE 2 DIABETES MELLITUS WITH HYPERGLYCEMIA, WITHOUT LONG-TERM CURRENT USE OF INSULIN (HCC): ICD-10-CM

## 2024-03-28 DIAGNOSIS — I10 ESSENTIAL HYPERTENSION: ICD-10-CM

## 2024-03-28 LAB
LEFT EYE DIABETIC RETINOPATHY: NORMAL
LEFT EYE IMAGE QUALITY: NORMAL
LEFT EYE MACULAR EDEMA: NORMAL
LEFT EYE OTHER RETINOPATHY: NORMAL
RIGHT EYE DIABETIC RETINOPATHY: NORMAL
RIGHT EYE IMAGE QUALITY: NORMAL
RIGHT EYE MACULAR EDEMA: NORMAL
RIGHT EYE OTHER RETINOPATHY: NORMAL
SEVERITY (EYE EXAM): NORMAL

## 2024-03-28 PROCEDURE — 99396 PREV VISIT EST AGE 40-64: CPT | Performed by: FAMILY MEDICINE

## 2024-03-28 PROCEDURE — 99213 OFFICE O/P EST LOW 20 MIN: CPT | Performed by: FAMILY MEDICINE

## 2024-03-28 NOTE — PATIENT INSTRUCTIONS
Call [phone] to schedule your mammogram  Wellness Visit for Adults   AMBULATORY CARE:   A wellness visit  is when you see your healthcare provider to get screened for health problems. Your healthcare provider will also give you advice on how to stay healthy. Write down your questions so you remember to ask them. Ask your healthcare provider how often you should have a wellness visit.  What happens at a wellness visit:  Your healthcare provider will ask about your health, and your family history of health problems. This includes high blood pressure, heart disease, and cancer. He or she will ask if you have symptoms that concern you, if you smoke, and about your mood. You may also be asked about your intake of medicines, supplements, food, and alcohol. Any of the following may be done:  Your weight  will be checked. Your height may also be checked so your body mass index (BMI) can be calculated. Your BMI shows if you are at a healthy weight.    Your blood pressure  and heart rate will be checked. Your temperature may also be checked.    Blood and urine tests  may be done. Blood tests may be done to check your cholesterol levels. Abnormal cholesterol levels increase your risk for heart disease and stroke. You may also need a blood or urine test to check for diabetes if you are at increased risk. Urine tests may be done to look for signs of an infection or kidney disease.    A physical exam  includes checking your heartbeat and lungs with a stethoscope. Your healthcare provider may also check your skin to look for sun damage.    Screening tests  may be recommended. A screening test is done to check for diseases that may not cause symptoms. The screening tests you may need depend on your age, gender, family history, and lifestyle habits. For example, colorectal screening may be recommended if you are 50 years old or older.    Screening tests you need if you are a woman:   A Pap smear  is used to screen for cervical  cancer. Pap smears are usually done every 3 to 5 years depending on your age. You may need them more often if you have had abnormal Pap smear test results in the past. Ask your healthcare provider how often you should have a Pap smear.    A mammogram  is an x-ray of your breasts to screen for breast cancer. Experts recommend mammograms every 2 years starting at age 50 years. You may need a mammogram at age 49 years or younger if you have an increased risk for breast cancer. Talk to your healthcare provider about when you should start having mammograms and how often you need them.    Vaccines you may need:   Get an influenza vaccine  every year. The influenza vaccine protects you from the flu. Several types of viruses cause the flu. The viruses change over time, so new vaccines are made each year.    Get a tetanus-diphtheria (Td) booster vaccine  every 10 years. This vaccine protects you against tetanus and diphtheria. Tetanus is a severe infection that may cause painful muscle spasms and lockjaw. Diphtheria is a severe bacterial infection that causes a thick covering in the back of your mouth and throat.    Get a human papillomavirus (HPV) vaccine  if you are female and aged 19 to 26 or male 19 to 21 and never received it. This vaccine protects you from HPV infection. HPV is the most common infection spread by sexual contact. HPV may also cause vaginal, penile, and anal cancers.    Get a pneumococcal vaccine  if you are aged 65 years or older. The pneumococcal vaccine is an injection given to protect you from pneumococcal disease. Pneumococcal disease is an infection caused by pneumococcal bacteria. The infection may cause pneumonia, meningitis, or an ear infection.    Get a shingles vaccine  if you are 60 or older, even if you have had shingles before. The shingles vaccine is an injection to protect you from the varicella-zoster virus. This is the same virus that causes chickenpox. Shingles is a painful rash that  develops in people who had chickenpox or have been exposed to the virus.    How to eat healthy:  My Plate is a model for planning healthy meals. It shows the types and amounts of foods that should go on your plate. Fruits and vegetables make up about half of your plate, and grains and protein make up the other half. A serving of dairy is included on the side of your plate. The amount of calories and serving sizes you need depends on your age, gender, weight, and height. Examples of healthy foods are listed below:  Eat a variety of vegetables  such as dark green, red, and orange vegetables. You can also include canned vegetables low in sodium (salt) and frozen vegetables without added butter or sauces.    Eat a variety of fresh fruits , canned fruit in 100% juice, frozen fruit, and dried fruit.    Include whole grains.  At least half of the grains you eat should be whole grains. Examples include whole-wheat bread, wheat pasta, brown rice, and whole-grain cereals such as oatmeal.    Eat a variety of protein foods such as seafood (fish and shellfish), lean meat, and poultry without skin (turkey and chicken). Examples of lean meats include pork leg, shoulder, or tenderloin, and beef round, sirloin, tenderloin, and extra lean ground beef. Other protein foods include eggs and egg substitutes, beans, peas, soy products, nuts, and seeds.    Choose low-fat dairy products such as skim or 1% milk or low-fat yogurt, cheese, and cottage cheese.    Limit unhealthy fats  such as butter, hard margarine, and shortening.       Exercise:  Exercise at least 30 minutes per day on most days of the week. Some examples of exercise include walking, biking, dancing, and swimming. You can also fit in more physical activity by taking the stairs instead of the elevator or parking farther away from stores. Include muscle strengthening activities 2 days each week. Regular exercise provides many health benefits. It helps you manage your weight,  and decreases your risk for type 2 diabetes, heart disease, stroke, and high blood pressure. Exercise can also help improve your mood. Ask your healthcare provider about the best exercise plan for you.       General health and safety guidelines:   Do not smoke.  Nicotine and other chemicals in cigarettes and cigars can cause lung damage. Ask your healthcare provider for information if you currently smoke and need help to quit. E-cigarettes or smokeless tobacco still contain nicotine. Talk to your healthcare provider before you use these products.    Limit alcohol.  A drink of alcohol is 12 ounces of beer, 5 ounces of wine, or 1½ ounces of liquor.    Lose weight, if needed.  Being overweight increases your risk of certain health conditions. These include heart disease, high blood pressure, type 2 diabetes, and certain types of cancer.    Protect your skin.  Do not sunbathe or use tanning beds. Use sunscreen with a SPF 15 or higher. Apply sunscreen at least 15 minutes before you go outside. Reapply sunscreen every 2 hours. Wear protective clothing, hats, and sunglasses when you are outside.    Drive safely.  Always wear your seatbelt. Make sure everyone in your car wears a seatbelt. A seatbelt can save your life if you are in an accident. Do not use your cell phone when you are driving. This could distract you and cause an accident. Pull over if you need to make a call or send a text message.    Practice safe sex.  Use latex condoms if are sexually active and have more than one partner. Your healthcare provider may recommend screening tests for sexually transmitted infections (STIs).    Wear helmets, lifejackets, and protective gear.  Always wear a helmet when you ride a bike or motorcycle, go skiing, or play sports that could cause a head injury. Wear protective equipment when you play sports. Wear a lifejacket when you are on a boat or doing water sports.    © Copyright [employer] 2023 Information is for End User's  use only and may not be sold, redistributed or otherwise used for commercial purposes.  The above information is an  only. It is not intended as medical advice for individual conditions or treatments. Talk to your doctor, nurse or pharmacist before following any medical regimen to see if it is safe and effective for you.

## 2024-03-28 NOTE — ASSESSMENT & PLAN NOTE
Lab Results   Component Value Date    HGBA1C 5.6 02/06/2024     Well controlled on home Metformin XR 1000 mg BID, Ozempic 2 mg weekly, Jardiance 10 mg daily  - Goal A1c < 7  - Complications: none  - Statin: Lipitor 20 mg daily  - ACE/ARB: Lisinopril 20 mg daily  - Follows with Endo  - Immunizations:  PCV-20: Declines  - Diabetic eye exam   - Iris done today.   - Exam unremarkable    Plan:  - C/w home regimen for now  - Follow up with Endocrinologist    Lab Results   Component Value Date/Time    HGBA1C 5.6 02/06/2024 02:00 PM    HGBA1C 6.4 (H) 09/30/2023 10:10 AM    HGBA1C 7.2 (A) 04/25/2023 11:02 AM    HGBA1C 7.5 (A) 02/07/2022 02:14 PM    HGBA1C 7.3 (H) 10/15/2020 09:40 AM    HGBA1C 6.4 (H) 10/16/2018 09:06 AM    HGBA1C 6.8 (H) 11/05/2015 06:51 AM    HGBA1C 6.1 (H) 08/12/2015 10:10 AM    HGBA1C 6.7 (H) 07/08/2015 06:30 AM         Lab Results   Component Value Date/Time    BUN 18 05/22/2023 07:44 AM    BUN 21 11/05/2015 06:51 AM    CREATININE 0.70 05/22/2023 07:44 AM    CREATININE 0.56 (L) 11/05/2015 06:51 AM         Lab Results   Component Value Date/Time    CREATININEUR 157.7 09/30/2023 10:10 AM    CREATININEUR 157.0 08/12/2015 10:11 AM    LABMICR 14.8 09/30/2023 10:10 AM    LABMICR 14 08/12/2015 10:11 AM    MICROALBCRE 9 09/30/2023 10:10 AM    MICROALBCRE 9 08/12/2015 10:11 AM

## 2024-03-28 NOTE — PROGRESS NOTES
ADULT ANNUAL PHYSICAL  [facility]    NAME: Martha Rausch  AGE: 58 y.o. SEX: female  : 1965     DATE: 3/28/2024     Assessment and Plan:     Problem List Items Addressed This Visit       Essential hypertension     Goal bp <140/90, pt's bp 114/79 at goal  - c/w clonidine 0.1 mg daily and lisinopril-HCTZ 20-25 mg daily   - last urine microalbumin more than 1 year ago - labs ordered today           Relevant Orders    Basic metabolic panel    Albumin / creatinine urine ratio    Type 2 diabetes mellitus (HCC)       Lab Results   Component Value Date    HGBA1C 5.6 2024     Well controlled on home Metformin XR 1000 mg BID, Ozempic 2 mg weekly, Jardiance 10 mg daily  - Goal A1c < 7  - Complications: none  - Statin: Lipitor 20 mg daily  - ACE/ARB: Lisinopril 20 mg daily  - Follows with Endo  - Immunizations:  PCV-20: Declines  - Diabetic eye exam   - Iris done today.   - Exam unremarkable    Plan:  - C/w home regimen for now  - Follow up with Endocrinologist    Lab Results   Component Value Date/Time    HGBA1C 5.6 2024 02:00 PM    HGBA1C 6.4 (H) 2023 10:10 AM    HGBA1C 7.2 (A) 2023 11:02 AM    HGBA1C 7.5 (A) 2022 02:14 PM    HGBA1C 7.3 (H) 10/15/2020 09:40 AM    HGBA1C 6.4 (H) 10/16/2018 09:06 AM    HGBA1C 6.8 (H) 2015 06:51 AM    HGBA1C 6.1 (H) 2015 10:10 AM    HGBA1C 6.7 (H) 2015 06:30 AM         Lab Results   Component Value Date/Time    BUN 18 2023 07:44 AM    BUN 21 2015 06:51 AM    CREATININE 0.70 2023 07:44 AM    CREATININE 0.56 (L) 2015 06:51 AM         Lab Results   Component Value Date/Time    CREATININEUR 157.7 2023 10:10 AM    CREATININEUR 157.0 2015 10:11 AM    LABMICR 14.8 2023 10:10 AM    LABMICR 14 2015 10:11 AM    MICROALBCRE 9 2023 10:10 AM    MICROALBCRE 9 2015 10:11 AM              Relevant Orders    IRIS  Diabetic eye exam (Completed)    Vitamin D deficiency    Relevant Orders    Vitamin D 1,25 dihydroxy    Annual physical exam - Primary     Pt states she is doing well at baseline state of health and without any acute concerns or questions at this visit.  - Screening for cardiovascular disease: yearly BMP and Lipid panel ordered today   - ASCVD score: 4.3% , pt on appropriate therapy, lipitor 20 mg daily in setting of T2DM  - Colonoscopy: Due in 2025, last 2015, no bx taken  - PAP/HPV: due, appt schedule in June  - Mammogram: Ordered, due in June.   - DEXA: order at 65 yo  - HIV and Hep C screenings: UTD  - Vaccinations: Declines vaccines, TDAP UTD (2016)  - Depression screening: PHQ score 0 (03/28/24), neg  - Counseled the patient on healthy lifestyle habits including diet and exercise.             Other Visit Diagnoses       Screening for cardiovascular condition        Relevant Orders    Lipid panel    Basic metabolic panel    Iron deficiency anemia, unspecified iron deficiency anemia type        Relevant Orders    Iron Panel (Includes Ferritin, Iron Sat%, Iron, and TIBC)    Reticulocytes    CBC and Platelet    Screening mammogram for breast cancer        Relevant Orders    Mammo screening bilateral w 3d & cad            Immunizations and preventive care screenings were discussed with patient today. Appropriate education was printed on patient's after visit summary.    Counseling:  Alcohol/drug use: discussed moderation in alcohol intake, the recommendations for healthy alcohol use, and avoidance of illicit drug use.  Dental Health: discussed importance of regular tooth brushing, flossing, and dental visits.  Injury prevention: discussed safety/seat belts, safety helmets, smoke detectors, carbon dioxide detectors, and smoking near bedding or upholstery.  Sexual health: discussed sexually transmitted diseases, partner selection, use of condoms, avoidance of unintended pregnancy, and contraceptive  alternatives.  Exercise: the importance of regular exercise/physical activity was discussed. Recommend exercise 3-5 times per week for at least 30 minutes.          No follow-ups on file.     Chief Complaint:     Chief Complaint   Patient presents with    Physical Exam      History of Present Illness:     Adult Annual Physical   Patient here for a comprehensive physical exam. The patient reports no problems.    Diet and Physical Activity  Diet/Nutrition: poor diet and low carb diet.   Exercise: no formal exercise. Active around the house.      Depression Screening  PHQ-2/9 Depression Screening    Little interest or pleasure in doing things: 0 - not at all  Feeling down, depressed, or hopeless: 0 - not at all  PHQ-2 Score: 0  PHQ-2 Interpretation: Negative depression screen       General Health  Sleep: gets 4-6 hours of sleep on average.   Hearing: normal - bilateral.  Vision: most recent eye exam <1 year ago and wears glasses.   Dental: regular dental visits, brushes teeth twice daily, and flosses teeth occasionally.       /GYN Health  Follows with gynecology? no   Patient is: postmenopausal  Last menstrual period: N/A  Contraceptive method: menopause.    Advanced Care Planning  Do you have an advanced directive? no  Do you have a durable medical power of ? no  ACP document given to the patient? no     Review of Systems:     Review of Systems   Constitutional:  Negative for activity change, appetite change and fever.   Respiratory:  Negative for cough and shortness of breath.    Gastrointestinal:  Positive for diarrhea. Negative for abdominal pain, anal bleeding, blood in stool, constipation, nausea and vomiting.   Genitourinary:  Negative for difficulty urinating.   Musculoskeletal:  Negative for joint swelling.   Neurological:  Positive for headaches.      Past Medical History:     Past Medical History:   Diagnosis Date    Asthma     Asthmatic bronchitis with exacerbation     Last Assessed: 8/7/2014     Diabetes mellitus (HCC)     GERD (gastroesophageal reflux disease)     History of COVID-19 10/27/21    Mild Symptoms- Fever,Chills and Bodyaches    Hyperlipidemia     Hypertension     Impaired fasting glucose     Last Assessed: 1/15/2015    Pneumonia     Sleep apnea     Mild    Urinary incontinence in female 04/16/2015      Past Surgical History:     Past Surgical History:   Procedure Laterality Date    CERVICAL CERCLAGE      COLONOSCOPY      HAND SURGERY Right     Wrist mass E/O    UT ANTERIOR COLPORRAPHY RPR CYSTOCELE W/CYSTO N/A 6/15/2020    Procedure: ANTERIOR AND POSTERIOR COLPORRHAPHY;  Surgeon: Sumit Powell MD;  Location: [facility];  Service: UroGynecology           UT COLPOPEXY VAGINAL EXTRAPERITONEAL APPROACH N/A 6/15/2020    Procedure: COLPOSUSPENSION VAGINAL EXTRAPERITONEAL(ENPLACE);  Surgeon: Sumit Powell MD;  [facility];  Service: UroGynecology           UT CYSTOURETHROSCOPY N/A 6/15/2020    Procedure: CYSTOSCOPY;  Surgeon: Sumit Powell MD;  [facility];  Service: UroGynecology           UT HYSTEROSCOPY BX ENDOMETRIUM&/POLYPC W/WO D&C N/A 6/18/2021    Procedure: DILATATION AND CURETTAGE (D&C) WITH HYSTEROSCOPY;  Surgeon: Ericka Reynoso MD;  [facility];  Service: Gynecology    UT SLING OPERATION STRESS INCONTINENCE N/A 6/15/2020    Procedure: PUBOVAGINAL SLING;  Surgeon: Sumit Powell MD;  [facility];  Service: UroGynecology           THYROIDECTOMY, PARTIAL      TUBAL LIGATION        Social History:     Social History     Socioeconomic History    Marital status: /Civil Union     Spouse name: None    Number of children: None    Years of education: None    Highest education level: None   Occupational History    None   Tobacco Use    Smoking status: Never    Smokeless tobacco: Never   Vaping Use    Vaping status: Never Used   Substance and Sexual Activity    Alcohol use: No    Drug use: No    Sexual activity: Yes     Partners: Male      Birth control/protection: Post-menopausal     Comment: Tubal ligation   Other Topics Concern    None   Social History Narrative    Uses safety equipment - seatbelts     Social Determinants of Health     Financial Resource Strain: Low Risk  (3/28/2024)    Overall Financial Resource Strain (CARDIA)     Difficulty of Paying Living Expenses: Not hard at all   Food Insecurity: No Food Insecurity (3/28/2024)    Hunger Vital Sign     Worried About Running Out of Food in the Last Year: Never true     Ran Out of Food in the Last Year: Never true   Transportation Needs: No Transportation Needs (3/28/2024)    PRAPARE - Transportation     Lack of Transportation (Medical): No     Lack of Transportation (Non-Medical): No   Physical Activity: Inactive (3/28/2024)    Exercise Vital Sign     Days of Exercise per Week: 0 days     Minutes of Exercise per Session: 0 min   Stress: No Stress Concern Present (2/6/2024)    [employer] - Occupational Stress Questionnaire     Feeling of Stress : Not at all   Social Connections: Socially Integrated (3/28/2024)    Social Connection and Isolation Panel [NHANES]     Frequency of Communication with Friends and Family: More than three times a week     Frequency of Social Gatherings with Friends and Family: More than three times a week     Attends Bahai Services: More than 4 times per year     Active Member of Clubs or Organizations: Yes     Attends Club or Organization Meetings: 1 to 4 times per year     Marital Status:    Intimate Partner Violence: Not At Risk (3/28/2024)    Humiliation, Afraid, Rape, and Kick questionnaire     Fear of Current or Ex-Partner: No     Emotionally Abused: No     Physically Abused: No     Sexually Abused: No   Housing Stability: Low Risk  (3/28/2024)    Housing Stability Vital Sign     Unable to Pay for Housing in the Last Year: No     Number of Places Lived in the Last Year: 1     Unstable Housing in the Last Year: No       Family History:     Family History   Problem Relation Age of Onset    Coronary artery disease Mother     Hypertension Mother     Stroke Mother     Diabetes Father     Stroke Father         Ischemic    No Known Problems Sister     Diabetes Brother     Heart disease Brother     Blindness Brother     Kidney failure Brother     No Known Problems Son     No Known Problems Daughter     No Known Problems Maternal Grandmother     No Known Problems Paternal Grandmother     No Known Problems Paternal Grandfather     No Known Problems Maternal Aunt     No Known Problems Maternal Aunt     No Known Problems Maternal Aunt     Cancer Paternal Aunt         unknown type    No Known Problems Paternal Aunt     No Known Problems Paternal Aunt     No Known Problems Paternal Aunt     No Known Problems Paternal Aunt     No Known Problems Paternal Aunt     No Known Problems Paternal Aunt     No Known Problems Paternal Aunt     Cancer Paternal Uncle         unknown type    Chronic infections Paternal Uncle         unknown type    Breast cancer Cousin         unknown age    Breast cancer Cousin       Current Medications:     Current Outpatient Medications   Medication Sig Dispense Refill    albuterol (Ventolin HFA) 90 mcg/act inhaler Inhale 2 puffs every 4 (four) hours as needed for wheezing or shortness of breath 8 g 0    ascorbic acid (VITAMIN C) 500 mg tablet Take 1 tablet (500 mg total) by mouth daily 90 tablet 1    atorvastatin (LIPITOR) 20 mg tablet take 1 tablet by mouth once daily 30 tablet 0    Bioflavonoid Products (VITAMIN C PLUS PO) Take by mouth      cholecalciferol (VITAMIN D3) 1,000 units tablet Take 1 tablet (1,000 Units total) by mouth daily 90 tablet 0    cloNIDine (CATAPRES) 0.1 mg tablet take 1 tablet by mouth every 12 hours 60 tablet 0    Dilt- MG 24 hr capsule take 1 capsule by mouth every morning 30 capsule 3    ferrous gluconate 324 (37.5 Fe) mg Take 1 tablet (324 mg total) by mouth 2 (two) times a day before  "meals 60 tablet 3    fluticasone (FLONASE) 50 mcg/act nasal spray 1 spray into each nostril daily 11.1 mL 1    Jardiance 10 MG TABS tablet take 1 tablet by mouth every morning 30 tablet 2    lisinopril-hydrochlorothiazide (PRINZIDE,ZESTORETIC) 20-25 MG per tablet take 1 tablet by mouth once daily 90 tablet 0    metFORMIN (GLUCOPHAGE-XR) 500 mg 24 hr tablet Take 2 tablets (1,000 mg total) by mouth 2 (two) times a day with meals Takes 2 500 mg Tablets = 1000 mg Twice/day 360 tablet 0    pantoprazole (PROTONIX) 40 mg tablet Take 1 tablet (40 mg total) by mouth daily 90 tablet 1    semaglutide, 2 mg/dose, (Ozempic, 2 MG/DOSE,) 8 mg/ mL injection pen Inject 0.75 mL (2 mg total) under the skin every 7 days 9 mL 0     No current facility-administered medications for this visit.      Allergies:     Allergies   Allergen Reactions    Contrast [Iodinated Contrast Media] Vomiting    Levaquin [Levofloxacin] Rash      Physical Exam:     /79 (BP Location: Left arm, Patient Position: Sitting, Cuff Size: Standard)   Pulse 61   Temp 97.9 °F (36.6 °C) (Temporal)   Resp 18   Ht 5' 2.6\" (1.59 m)   Wt 75.3 kg (166 lb)   LMP 12/12/2019 (Exact Date)   SpO2 98%   BMI 29.78 kg/m²     Physical Exam  Constitutional:       Appearance: Normal appearance.   HENT:      Head: Normocephalic.      Right Ear: Tympanic membrane, ear canal and external ear normal.      Left Ear: Tympanic membrane, ear canal and external ear normal.      Nose: Nose normal.      Mouth/Throat:      Pharynx: No oropharyngeal exudate or posterior oropharyngeal erythema.   Eyes:      Pupils: Pupils are equal, round, and reactive to light.   Cardiovascular:      Rate and Rhythm: Normal rate and regular rhythm.      Heart sounds: No murmur heard.     No friction rub. No gallop.   Pulmonary:      Effort: Pulmonary effort is normal.      Breath sounds: Normal breath sounds. No stridor. No wheezing, rhonchi or rales.   Abdominal:      General: Bowel sounds are " normal. There is no distension.      Palpations: Abdomen is soft.      Tenderness: There is no abdominal tenderness. There is no guarding.   Skin:     General: Skin is warm.   Neurological:      Mental Status: She is alert.          DO JOS Corona [facility]

## 2024-03-28 NOTE — ASSESSMENT & PLAN NOTE
Pt states she is doing well at baseline state of health and without any acute concerns or questions at this visit.  - Screening for cardiovascular disease: yearly BMP and Lipid panel ordered today   - ASCVD score: 4.3% , pt on appropriate therapy, lipitor 20 mg daily in setting of T2DM  - Colonoscopy: Due in 2025, last 2015, no bx taken  - PAP/HPV: due, appt schedule in June  - Mammogram: Ordered, due in June.   - DEXA: order at 63 yo  - HIV and Hep C screenings: UTD  - Vaccinations: Declines vaccines, TDAP UTD (2016)  - Depression screening: PHQ score 0 (03/28/24), neg  - Counseled the patient on healthy lifestyle habits including diet and exercise.

## 2024-03-28 NOTE — ASSESSMENT & PLAN NOTE
Goal bp <140/90, pt's bp 114/79 at goal  - c/w clonidine 0.1 mg daily and lisinopril-HCTZ 20-25 mg daily   - last urine microalbumin more than 1 year ago - labs ordered today

## 2024-04-03 DIAGNOSIS — I10 ESSENTIAL HYPERTENSION: ICD-10-CM

## 2024-04-03 DIAGNOSIS — E11.65 TYPE 2 DIABETES MELLITUS WITH HYPERGLYCEMIA, WITHOUT LONG-TERM CURRENT USE OF INSULIN (HCC): ICD-10-CM

## 2024-04-03 DIAGNOSIS — E78.5 HYPERLIPIDEMIA, UNSPECIFIED HYPERLIPIDEMIA TYPE: ICD-10-CM

## 2024-04-03 DIAGNOSIS — I10 HTN (HYPERTENSION), BENIGN: ICD-10-CM

## 2024-04-03 RX ORDER — EMPAGLIFLOZIN 10 MG/1
10 TABLET, FILM COATED ORAL EVERY MORNING
Qty: 30 TABLET | Refills: 5 | Status: SHIPPED | OUTPATIENT
Start: 2024-04-03

## 2024-04-05 RX ORDER — CLONIDINE HYDROCHLORIDE 0.1 MG/1
0.1 TABLET ORAL EVERY 12 HOURS
Qty: 60 TABLET | Refills: 0 | Status: SHIPPED | OUTPATIENT
Start: 2024-04-05

## 2024-04-05 RX ORDER — ATORVASTATIN CALCIUM 20 MG/1
TABLET, FILM COATED ORAL
Qty: 30 TABLET | Refills: 0 | Status: SHIPPED | OUTPATIENT
Start: 2024-04-05

## 2024-04-25 DIAGNOSIS — I10 ESSENTIAL HYPERTENSION: ICD-10-CM

## 2024-04-25 RX ORDER — DILTIAZEM HYDROCHLORIDE 120 MG/1
120 CAPSULE, EXTENDED RELEASE ORAL EVERY MORNING
Qty: 90 CAPSULE | Refills: 3 | Status: SHIPPED | OUTPATIENT
Start: 2024-04-25

## 2024-05-02 ENCOUNTER — OFFICE VISIT (OUTPATIENT)
Dept: ENDOCRINOLOGY | Facility: CLINIC | Age: 59
End: 2024-05-02
Payer: COMMERCIAL

## 2024-05-02 VITALS
OXYGEN SATURATION: 96 % | HEART RATE: 68 BPM | WEIGHT: 165 LBS | BODY MASS INDEX: 30.36 KG/M2 | TEMPERATURE: 97.8 F | SYSTOLIC BLOOD PRESSURE: 124 MMHG | HEIGHT: 62 IN | RESPIRATION RATE: 16 BRPM | DIASTOLIC BLOOD PRESSURE: 76 MMHG

## 2024-05-02 DIAGNOSIS — E55.9 VITAMIN D DEFICIENCY: ICD-10-CM

## 2024-05-02 DIAGNOSIS — E66.9 OBESITY (BMI 30-39.9): ICD-10-CM

## 2024-05-02 DIAGNOSIS — E11.65 TYPE 2 DIABETES MELLITUS WITH HYPERGLYCEMIA, WITHOUT LONG-TERM CURRENT USE OF INSULIN (HCC): Primary | ICD-10-CM

## 2024-05-02 DIAGNOSIS — E78.2 MIXED HYPERLIPIDEMIA: ICD-10-CM

## 2024-05-02 PROCEDURE — 3078F DIAST BP <80 MM HG: CPT | Performed by: INTERNAL MEDICINE

## 2024-05-02 PROCEDURE — 99214 OFFICE O/P EST MOD 30 MIN: CPT | Performed by: INTERNAL MEDICINE

## 2024-05-02 PROCEDURE — 3074F SYST BP LT 130 MM HG: CPT | Performed by: INTERNAL MEDICINE

## 2024-05-02 RX ORDER — METFORMIN HYDROCHLORIDE 500 MG/1
1000 TABLET, EXTENDED RELEASE ORAL 2 TIMES DAILY WITH MEALS
Qty: 360 TABLET | Refills: 1 | Status: SHIPPED | OUTPATIENT
Start: 2024-05-02 | End: 2024-05-02

## 2024-05-02 RX ORDER — METFORMIN HYDROCHLORIDE 500 MG/1
1000 TABLET, EXTENDED RELEASE ORAL 2 TIMES DAILY WITH MEALS
Qty: 360 TABLET | Refills: 1 | Status: SHIPPED | OUTPATIENT
Start: 2024-05-02 | End: 2024-10-29

## 2024-05-02 NOTE — PROGRESS NOTES
"    Follow-up Patient Progress Note      CC: Type 2 diabetes     History of Present Illness:   49-year-old female with type 2 diabetes since 2018, HTN, HLD, obesity BMI 33, KIM, mild intermittent asthma, vitamin D deficiency, GERD, diverticulosis of colon, chronic low back pain and abnormal TSH. Last visit was 10/2/2023.     Since last visit, she lost 7 lbs. She feels well.     Home blood glucose monitoring: Checks once or twice a day.  Usually less than 200 mg/dL per     Current meds:  Metformin 1000mg BID  Jardiance 10 mg daily  Ozempic 2 mg weekly     Opthamology: Yes  Podiatry: No  vaccination: Yes  Dental: No  Pancreatitis: No     Ace/ARB: Lisinopril  Statin: Lipitor 20  Thyroid issues: Recent elevated TSH.      Physical Exam:  Body mass index is 30.18 kg/m².  /76 (BP Location: Left arm, Patient Position: Sitting)   Pulse 68   Temp 97.8 °F (36.6 °C) (Tympanic)   Resp 16   Ht 5' 2\" (1.575 m)   Wt 74.8 kg (165 lb)   LMP 12/12/2019 (Exact Date)   SpO2 96%   BMI 30.18 kg/m²    Vitals:    05/02/24 1249   Weight: 74.8 kg (165 lb)        Physical Exam  Constitutional:       General: She is not in acute distress.     Appearance: She is well-developed.   HENT:      Head: Normocephalic and atraumatic.      Nose: Nose normal.   Eyes:      Conjunctiva/sclera: Conjunctivae normal.   Pulmonary:      Effort: Pulmonary effort is normal.   Abdominal:      General: There is no distension.   Musculoskeletal:      Cervical back: Normal range of motion and neck supple.   Skin:     Findings: No rash.      Comments: No icterus   Neurological:      Mental Status: She is alert and oriented to person, place, and time.         Labs:   Lab Results   Component Value Date    HGBA1C 5.6 02/06/2024       Lab Results   Component Value Date    DGG7DSHLKVJL 3.697 05/22/2023       Lab Results   Component Value Date    CREATININE 0.70 05/22/2023    CREATININE 0.78 02/15/2022    CREATININE 0.60 06/01/2020    BUN 18 05/22/2023    NA " 138 11/05/2015    K 3.4 (L) 05/22/2023     05/22/2023    CO2 30 05/22/2023     eGFR   Date Value Ref Range Status   05/22/2023 95 ml/min/1.73sq m Final       Lab Results   Component Value Date    ALT 41 02/15/2022    AST 29 02/15/2022    ALKPHOS 62 02/15/2022    BILITOT 0.13 (L) 11/05/2015       Lab Results   Component Value Date    CHOLESTEROL 141 05/22/2023    CHOLESTEROL 237 (H) 02/15/2022    CHOLESTEROL 139 10/15/2020     Lab Results   Component Value Date    HDL 54 05/22/2023    HDL 71 02/15/2022    HDL 59 10/15/2020     Lab Results   Component Value Date    TRIG 138 05/22/2023    TRIG 165 (H) 02/15/2022    TRIG 132 10/15/2020     Lab Results   Component Value Date    NONHDLC 87 05/22/2023    NONHDLC 166 02/15/2022    NONHDLC 80 10/15/2020         Assessment/Plan:    1. Type 2 diabetes mellitus with hyperglycemia, without long-term current use of insulin (HCC)  Assessment & Plan:  She is significantly improved with glycemia and weight.  Today we agreed to stop jardiance, continue metformin and continue ozempic.    In future, will wean off all medication. Goal is non diabetes state.  Encouraged daily exercise.    Follow up in 6 months.    Lab Results   Component Value Date    HGBA1C 5.6 02/06/2024       Orders:  -     Hemoglobin A1C; Future  -     metFORMIN (GLUCOPHAGE-XR) 500 mg 24 hr tablet; Take 2 tablets (1,000 mg total) by mouth 2 (two) times a day with meals Takes 2 500 mg Tablets = 1000 mg Twice/day  -     semaglutide, 2 mg/dose, (Ozempic, 2 MG/DOSE,) 8 mg/ mL injection pen; Inject 0.75 mL (2 mg total) under the skin every 7 days    2. Obesity (BMI 30-39.9)  Assessment & Plan:  Advised diet and lifestyle changes.      3. Mixed hyperlipidemia  -     Lipid panel; Future    4. Vitamin D deficiency  Assessment & Plan:  Tae vit D3 5000IU daily.    Orders:  -     Vitamin D 25 hydroxy; Future          I have spent a total time of 32 minutes on 05/02/24 in caring for this patient including greater than 50%  of this time was spent in counseling/coordination of care as listed above.       Discussed with the patient and all questioned fully answered. She will contact me with concerns.    Desi Aguilar

## 2024-05-03 NOTE — ASSESSMENT & PLAN NOTE
She is significantly improved with glycemia and weight.  Today we agreed to stop jardiance, continue metformin and continue ozempic.    In future, will wean off all medication. Goal is non diabetes state.  Encouraged daily exercise.    Follow up in 6 months.    Lab Results   Component Value Date    HGBA1C 5.6 02/06/2024

## 2024-05-06 DIAGNOSIS — E55.9 VITAMIN D INSUFFICIENCY: ICD-10-CM

## 2024-05-08 RX ORDER — MELATONIN
1000 DAILY
Qty: 90 TABLET | Refills: 0 | Status: SHIPPED | OUTPATIENT
Start: 2024-05-08

## 2024-05-09 ENCOUNTER — TELEPHONE (OUTPATIENT)
Age: 59
End: 2024-05-09

## 2024-05-09 NOTE — TELEPHONE ENCOUNTER
PA for Ozempic not required     Reason  Your PA has been resolved, no additional PA is required.             Message sent to provider pool yes

## 2024-05-10 DIAGNOSIS — I10 HTN (HYPERTENSION), BENIGN: ICD-10-CM

## 2024-05-10 DIAGNOSIS — I10 ESSENTIAL HYPERTENSION: ICD-10-CM

## 2024-05-10 DIAGNOSIS — E78.5 HYPERLIPIDEMIA, UNSPECIFIED HYPERLIPIDEMIA TYPE: ICD-10-CM

## 2024-05-10 RX ORDER — ATORVASTATIN CALCIUM 20 MG/1
TABLET, FILM COATED ORAL
Qty: 30 TABLET | Refills: 0 | Status: SHIPPED | OUTPATIENT
Start: 2024-05-10

## 2024-05-10 RX ORDER — CLONIDINE HYDROCHLORIDE 0.1 MG/1
0.1 TABLET ORAL EVERY 12 HOURS
Qty: 60 TABLET | Refills: 0 | Status: SHIPPED | OUTPATIENT
Start: 2024-05-10

## 2024-06-04 ENCOUNTER — ANNUAL EXAM (OUTPATIENT)
Dept: FAMILY MEDICINE CLINIC | Facility: CLINIC | Age: 59
End: 2024-06-04

## 2024-06-04 VITALS
BODY MASS INDEX: 30.25 KG/M2 | HEART RATE: 69 BPM | SYSTOLIC BLOOD PRESSURE: 130 MMHG | OXYGEN SATURATION: 99 % | DIASTOLIC BLOOD PRESSURE: 89 MMHG | HEIGHT: 62 IN | TEMPERATURE: 98.1 F | WEIGHT: 164.4 LBS | RESPIRATION RATE: 18 BRPM

## 2024-06-04 DIAGNOSIS — Z12.4 CERVICAL CANCER SCREENING: Primary | ICD-10-CM

## 2024-06-04 PROCEDURE — G0145 SCR C/V CYTO,THINLAYER,RESCR: HCPCS

## 2024-06-04 PROCEDURE — 99213 OFFICE O/P EST LOW 20 MIN: CPT | Performed by: FAMILY MEDICINE

## 2024-06-04 PROCEDURE — G0476 HPV COMBO ASSAY CA SCREEN: HCPCS

## 2024-06-04 NOTE — PROGRESS NOTES
ANNUAL GYN VISIT:        Martha was seen today for gynecologic exam.    Diagnoses and all orders for this visit:    Cervical cancer screening  Comments:  Normal well women exam, pending PAP/HPV results.  Orders:  -     Liquid-based pap, screening; Future  -     Liquid-based pap, screening           Subjective      Martha Rausch is a 58 y.o. female who presents for annual well woman exam. Last period around 53 yo. No postmenopausal bleeding, spotting, or discharge.   D&C 2021 for post menopausal bleeding. Bx was benign. No post menopausal bleeding since.     GYN:  No vaginal discharge, labial erythema or lesions, dyspareunia.  Menarche at 12 yo.  Contraception: none.  Patient is sexually active with male partner,   Last pap smear was in 19. Results were normal pap, normal HPV.  D&C 2021, Sling operation 6/15/2020 gynecologic surgeries.    OB:   female  Pregnancies were vaginal deliveries.    :  No dysuria, urinary frequency or urgency.  No  hematuria, flank pain, incontinence.    Breast:  No breast mass, skin changes, dimpling, reddening, nipple retraction.  No breast discharge.  Last mammogram was in 2023. Results were normal. Next scheduled for 24.   Patient does maternal side 3 cousins, paternal side 1 cousin 1 breast cancer. No family history of endometrial or ovarian ca.     General:  Diet: Portion controlled, veges, fruits.  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 Results were normal PAP, normal HPV.  Breast cancer: last mammogram in 23. Results were normal.  Colon cancer: last colonoscopy in . Results were normal.  STD screening: normal    Review of Systems   Constitutional:  Negative for chills, fatigue and fever.   HENT:  Negative for congestion, rhinorrhea, sneezing and sore throat.    Eyes:  Negative for visual  disturbance.   Respiratory:  Negative for cough and shortness of breath.    Cardiovascular:  Negative for chest pain and palpitations.   Gastrointestinal:  Negative for abdominal pain, constipation, diarrhea, nausea and vomiting.   Genitourinary:  Negative for dysuria, frequency, hematuria, menstrual problem and pelvic pain.   Skin:  Negative for rash.   Psychiatric/Behavioral:  Negative for sleep disturbance.          Objective   Vitals:    06/04/24 1259   BP: 130/89   Pulse: 69   Resp: 18   Temp: 98.1 °F (36.7 °C)   SpO2: 99%           Physical Exam  Exam conducted with a chaperone present.   Constitutional:       General: She is not in acute distress.     Appearance: She is not ill-appearing or toxic-appearing.   HENT:      Head: Normocephalic and atraumatic.      Right Ear: External ear normal.      Left Ear: External ear normal.   Eyes:      General: No scleral icterus.        Right eye: No discharge.         Left eye: No discharge.      Extraocular Movements: Extraocular movements intact.      Conjunctiva/sclera: Conjunctivae normal.   Cardiovascular:      Rate and Rhythm: Normal rate.   Pulmonary:      Effort: Pulmonary effort is normal. No respiratory distress.   Genitourinary:     General: Normal vulva.      Vagina: Normal. No vaginal discharge.      Cervix: Eversion present. No cervical bleeding.   Skin:     General: Skin is warm and dry.   Neurological:      General: No focal deficit present.      Mental Status: She is alert.      Motor: No weakness.      Gait: Gait normal.   Psychiatric:         Mood and Affect: Mood normal.         Behavior: Behavior normal.         Thought Content: Thought content normal.         Judgment: Judgment normal.

## 2024-06-05 DIAGNOSIS — I10 ESSENTIAL HYPERTENSION: ICD-10-CM

## 2024-06-05 RX ORDER — LISINOPRIL AND HYDROCHLOROTHIAZIDE 25; 20 MG/1; MG/1
TABLET ORAL
Qty: 90 TABLET | Refills: 0 | Status: SHIPPED | OUTPATIENT
Start: 2024-06-05

## 2024-06-12 LAB
LAB AP GYN PRIMARY INTERPRETATION: NORMAL
Lab: NORMAL

## 2024-07-29 DIAGNOSIS — R10.13 DYSPEPSIA: ICD-10-CM

## 2024-07-29 RX ORDER — PANTOPRAZOLE SODIUM 40 MG/1
40 TABLET, DELAYED RELEASE ORAL DAILY
Qty: 90 TABLET | Refills: 1 | Status: SHIPPED | OUTPATIENT
Start: 2024-07-29

## 2024-10-04 DIAGNOSIS — E11.65 TYPE 2 DIABETES MELLITUS WITH HYPERGLYCEMIA, WITHOUT LONG-TERM CURRENT USE OF INSULIN (HCC): ICD-10-CM

## 2024-10-04 RX ORDER — METFORMIN HCL 500 MG
1000 TABLET, EXTENDED RELEASE 24 HR ORAL 2 TIMES DAILY WITH MEALS
Qty: 360 TABLET | Refills: 0 | Status: SHIPPED | OUTPATIENT
Start: 2024-10-04 | End: 2025-04-02

## 2024-10-14 DIAGNOSIS — E61.1 IRON DEFICIENCY: ICD-10-CM

## 2024-10-15 DIAGNOSIS — E11.65 TYPE 2 DIABETES MELLITUS WITH HYPERGLYCEMIA, WITHOUT LONG-TERM CURRENT USE OF INSULIN (HCC): ICD-10-CM

## 2024-10-15 RX ORDER — FERROUS GLUCONATE 324(38)MG
1 TABLET ORAL
Qty: 60 TABLET | Refills: 0 | Status: SHIPPED | OUTPATIENT
Start: 2024-10-15

## 2024-10-16 ENCOUNTER — HOSPITAL ENCOUNTER (OUTPATIENT)
Dept: RADIOLOGY | Age: 59
Discharge: HOME | End: 2024-10-16
Payer: COMMERCIAL

## 2024-10-16 VITALS — HEIGHT: 62 IN | BODY MASS INDEX: 30.18 KG/M2 | WEIGHT: 164 LBS

## 2024-10-16 DIAGNOSIS — Z12.31 SCREENING MAMMOGRAM FOR BREAST CANCER: ICD-10-CM

## 2024-10-16 PROCEDURE — 77067 SCR MAMMO BI INCL CAD: CPT

## 2024-10-16 PROCEDURE — 77063 BREAST TOMOSYNTHESIS BI: CPT

## 2024-10-23 ENCOUNTER — HOSPITAL ENCOUNTER (OUTPATIENT)
Dept: ULTRASOUND IMAGING | Facility: CLINIC | Age: 59
Discharge: HOME | End: 2024-10-23
Payer: COMMERCIAL

## 2024-10-23 DIAGNOSIS — R92.8 ABNORMAL MAMMOGRAM: ICD-10-CM

## 2024-10-23 PROCEDURE — 76642 ULTRASOUND BREAST LIMITED: CPT

## 2024-11-05 ENCOUNTER — OFFICE VISIT (OUTPATIENT)
Dept: ENDOCRINOLOGY | Facility: CLINIC | Age: 59
End: 2024-11-05
Payer: COMMERCIAL

## 2024-11-05 ENCOUNTER — APPOINTMENT (OUTPATIENT)
Dept: LAB | Facility: CLINIC | Age: 59
End: 2024-11-05
Payer: COMMERCIAL

## 2024-11-05 VITALS
WEIGHT: 167 LBS | HEART RATE: 60 BPM | HEIGHT: 62 IN | BODY MASS INDEX: 30.73 KG/M2 | DIASTOLIC BLOOD PRESSURE: 80 MMHG | OXYGEN SATURATION: 98 % | TEMPERATURE: 98 F | SYSTOLIC BLOOD PRESSURE: 150 MMHG

## 2024-11-05 DIAGNOSIS — E78.2 MIXED HYPERLIPIDEMIA: ICD-10-CM

## 2024-11-05 DIAGNOSIS — D50.9 IRON DEFICIENCY ANEMIA, UNSPECIFIED IRON DEFICIENCY ANEMIA TYPE: ICD-10-CM

## 2024-11-05 DIAGNOSIS — E55.9 VITAMIN D DEFICIENCY: ICD-10-CM

## 2024-11-05 DIAGNOSIS — I10 ESSENTIAL HYPERTENSION: ICD-10-CM

## 2024-11-05 DIAGNOSIS — E11.65 TYPE 2 DIABETES MELLITUS WITH HYPERGLYCEMIA, WITHOUT LONG-TERM CURRENT USE OF INSULIN (HCC): ICD-10-CM

## 2024-11-05 DIAGNOSIS — Z13.6 SCREENING FOR CARDIOVASCULAR CONDITION: ICD-10-CM

## 2024-11-05 DIAGNOSIS — I10 HTN (HYPERTENSION), BENIGN: ICD-10-CM

## 2024-11-05 DIAGNOSIS — E66.9 OBESITY (BMI 30-39.9): ICD-10-CM

## 2024-11-05 DIAGNOSIS — E11.65 TYPE 2 DIABETES MELLITUS WITH HYPERGLYCEMIA, WITHOUT LONG-TERM CURRENT USE OF INSULIN (HCC): Primary | ICD-10-CM

## 2024-11-05 LAB
25(OH)D3 SERPL-MCNC: 21.4 NG/ML (ref 30–100)
ANION GAP SERPL CALCULATED.3IONS-SCNC: 7 MMOL/L (ref 4–13)
BUN SERPL-MCNC: 21 MG/DL (ref 5–25)
CALCIUM SERPL-MCNC: 9.4 MG/DL (ref 8.4–10.2)
CHLORIDE SERPL-SCNC: 107 MMOL/L (ref 96–108)
CHOLEST SERPL-MCNC: 228 MG/DL
CO2 SERPL-SCNC: 28 MMOL/L (ref 21–32)
CREAT SERPL-MCNC: 0.73 MG/DL (ref 0.6–1.3)
CREAT UR-MCNC: 181.8 MG/DL
ERYTHROCYTE [DISTWIDTH] IN BLOOD BY AUTOMATED COUNT: 14.6 % (ref 11.6–15.1)
EST. AVERAGE GLUCOSE BLD GHB EST-MCNC: 117 MG/DL
FERRITIN SERPL-MCNC: 11 NG/ML (ref 11–307)
GFR SERPL CREATININE-BSD FRML MDRD: 90 ML/MIN/1.73SQ M
GLUCOSE P FAST SERPL-MCNC: 99 MG/DL (ref 65–99)
HBA1C MFR BLD: 5.7 %
HCT VFR BLD AUTO: 39.8 % (ref 34.8–46.1)
HDLC SERPL-MCNC: 63 MG/DL
HGB BLD-MCNC: 12.8 G/DL (ref 11.5–15.4)
IRON SATN MFR SERPL: 19 % (ref 15–50)
IRON SERPL-MCNC: 64 UG/DL (ref 50–212)
LDLC SERPL CALC-MCNC: 141 MG/DL (ref 0–100)
MCH RBC QN AUTO: 27.9 PG (ref 26.8–34.3)
MCHC RBC AUTO-ENTMCNC: 32.2 G/DL (ref 31.4–37.4)
MCV RBC AUTO: 87 FL (ref 82–98)
MICROALBUMIN UR-MCNC: 12 MG/L
MICROALBUMIN/CREAT 24H UR: 7 MG/G CREATININE (ref 0–30)
NONHDLC SERPL-MCNC: 165 MG/DL
PLATELET # BLD AUTO: 210 THOUSANDS/UL (ref 149–390)
PMV BLD AUTO: 11.1 FL (ref 8.9–12.7)
POTASSIUM SERPL-SCNC: 3.7 MMOL/L (ref 3.5–5.3)
RBC # BLD AUTO: 4.59 MILLION/UL (ref 3.81–5.12)
RETICS # AUTO: NORMAL 10*3/UL (ref 14097–95744)
RETICS # CALC: 1.2 % (ref 0.37–1.87)
SODIUM SERPL-SCNC: 142 MMOL/L (ref 135–147)
TIBC SERPL-MCNC: 337 UG/DL (ref 250–450)
TRIGL SERPL-MCNC: 120 MG/DL
UIBC SERPL-MCNC: 273 UG/DL (ref 155–355)
WBC # BLD AUTO: 4.99 THOUSAND/UL (ref 4.31–10.16)

## 2024-11-05 PROCEDURE — 82570 ASSAY OF URINE CREATININE: CPT

## 2024-11-05 PROCEDURE — 82652 VIT D 1 25-DIHYDROXY: CPT

## 2024-11-05 PROCEDURE — 83036 HEMOGLOBIN GLYCOSYLATED A1C: CPT

## 2024-11-05 PROCEDURE — 85045 AUTOMATED RETICULOCYTE COUNT: CPT

## 2024-11-05 PROCEDURE — 82728 ASSAY OF FERRITIN: CPT

## 2024-11-05 PROCEDURE — 80061 LIPID PANEL: CPT

## 2024-11-05 PROCEDURE — 36415 COLL VENOUS BLD VENIPUNCTURE: CPT

## 2024-11-05 PROCEDURE — 99214 OFFICE O/P EST MOD 30 MIN: CPT | Performed by: INTERNAL MEDICINE

## 2024-11-05 PROCEDURE — 82306 VITAMIN D 25 HYDROXY: CPT

## 2024-11-05 PROCEDURE — 82043 UR ALBUMIN QUANTITATIVE: CPT

## 2024-11-05 PROCEDURE — 83550 IRON BINDING TEST: CPT

## 2024-11-05 PROCEDURE — 83540 ASSAY OF IRON: CPT

## 2024-11-05 PROCEDURE — 80048 BASIC METABOLIC PNL TOTAL CA: CPT

## 2024-11-05 PROCEDURE — 85027 COMPLETE CBC AUTOMATED: CPT

## 2024-11-05 RX ORDER — METFORMIN HYDROCHLORIDE 500 MG/1
1000 TABLET, EXTENDED RELEASE ORAL 2 TIMES DAILY WITH MEALS
Qty: 360 TABLET | Refills: 0 | Status: SHIPPED | OUTPATIENT
Start: 2024-11-05 | End: 2025-05-04

## 2024-11-05 RX ORDER — ATORVASTATIN CALCIUM 20 MG/1
20 TABLET, FILM COATED ORAL DAILY
Qty: 90 TABLET | Refills: 1 | Status: SHIPPED | OUTPATIENT
Start: 2024-11-05

## 2024-11-05 NOTE — ASSESSMENT & PLAN NOTE
She is significantly improved with glycemia and weight but she gained 2lbs in spite of Ozempic.    Today we agreed to start medical fitness training again for 3 months, continue metformin and continue ozempic 2mg weekly.    In future, will wean off all medication. Goal is non diabetes state.  Encouraged daily exercise.    Follow up in 3 months.    Lab Results   Component Value Date    HGBA1C 5.6 02/06/2024

## 2024-11-05 NOTE — PROGRESS NOTES
"    Follow-up Patient Progress Note      CC: Type 2 diabetes     History of Present Illness:   49-year-old female with type 2 diabetes since 2018, HTN, HLD, obesity BMI 33, KIM, mild intermittent asthma, vitamin D deficiency, GERD, diverticulosis of colon, chronic low back pain and abnormal TSH. Last visit was 5/2/2024.     Since last visit, she gained 2 lbs. She feels well.     Home blood glucose monitoring: Checks once or twice a day.  Usually less than 200 mg/dL per     Current meds:  Metformin 1000mg BID  Ozempic 2 mg weekly     Opthamology: Yes  Podiatry: No  vaccination: Yes  Dental: No  Pancreatitis: No     Ace/ARB: Lisinopril  Statin: Lipitor 20  Thyroid issues: Recent elevated TSH.    Physical Exam:  Body mass index is 30.54 kg/m².  /80 (BP Location: Left arm, Patient Position: Sitting, Cuff Size: Standard)   Pulse 60   Temp 98 °F (36.7 °C) (Tympanic)   Ht 5' 2\" (1.575 m)   Wt 75.8 kg (167 lb)   LMP 12/12/2019 (Exact Date)   SpO2 98%   BMI 30.54 kg/m²    Vitals:    11/05/24 1059   Weight: 75.8 kg (167 lb)        Physical Exam  Constitutional:       General: She is not in acute distress.     Appearance: She is well-developed.   HENT:      Head: Normocephalic and atraumatic.      Nose: Nose normal.   Eyes:      Conjunctiva/sclera: Conjunctivae normal.   Pulmonary:      Effort: Pulmonary effort is normal.   Abdominal:      General: There is no distension.   Musculoskeletal:      Cervical back: Normal range of motion and neck supple.   Skin:     Findings: No rash.      Comments: No icterus   Neurological:      Mental Status: She is alert and oriented to person, place, and time.         Labs:   Lab Results   Component Value Date    HGBA1C 5.6 02/06/2024       Lab Results   Component Value Date    KOO3BNSIQSMV 3.697 05/22/2023       Lab Results   Component Value Date    CREATININE 0.73 11/05/2024    CREATININE 0.70 05/22/2023    CREATININE 0.78 02/15/2022    BUN 21 11/05/2024     11/05/2015    " K 3.7 11/05/2024     11/05/2024    CO2 28 11/05/2024     eGFR   Date Value Ref Range Status   11/05/2024 90 ml/min/1.73sq m Final       Lab Results   Component Value Date    ALT 41 02/15/2022    AST 29 02/15/2022    ALKPHOS 62 02/15/2022    BILITOT 0.13 (L) 11/05/2015       Lab Results   Component Value Date    CHOLESTEROL 228 (H) 11/05/2024    CHOLESTEROL 141 05/22/2023    CHOLESTEROL 237 (H) 02/15/2022     Lab Results   Component Value Date    HDL 63 11/05/2024    HDL 54 05/22/2023    HDL 71 02/15/2022     Lab Results   Component Value Date    TRIG 120 11/05/2024    TRIG 138 05/22/2023    TRIG 165 (H) 02/15/2022     Lab Results   Component Value Date    NONHDLC 165 11/05/2024    NONHDLC 87 05/22/2023    NONHDLC 166 02/15/2022         Assessment/Plan:    1. Type 2 diabetes mellitus with hyperglycemia, without long-term current use of insulin (HCC)  Assessment & Plan:  She is significantly improved with glycemia and weight but she gained 2lbs in spite of Ozempic.    Today we agreed to start medical fitness training again for 3 months, continue metformin and continue ozempic 2mg weekly.    In future, will wean off all medication. Goal is non diabetes state.  Encouraged daily exercise.    Follow up in 3 months.    Lab Results   Component Value Date    HGBA1C 5.6 02/06/2024     Orders:  -     Ambulatory Referral to Medical Fitness Exercise Specialist; Future  -     Hemoglobin A1C; Future  -     Albumin / creatinine urine ratio; Future  -     semaglutide, 2 mg/dose, (Ozempic, 2 MG/DOSE,) 8 mg/ mL injection pen; Inject 0.75 mL (2 mg total) under the skin every 7 days  -     metFORMIN (GLUCOPHAGE-XR) 500 mg 24 hr tablet; Take 2 tablets (1,000 mg total) by mouth 2 (two) times a day with meals Takes 2 500 mg Tablets = 1000 mg Twice/day  2. Obesity (BMI 30-39.9)  3. Vitamin D deficiency  4. Mixed hyperlipidemia  Assessment & Plan:  Tc and non HDL were elevated in context of running out of lipitor.  Resent rx and  refill  Orders:  -     atorvastatin (LIPITOR) 20 mg tablet; Take 1 tablet (20 mg total) by mouth daily        I have spent a total time of  minutes on 11/05/24 in caring for this patient including greater than 50% of this time was spent in counseling/coordination of care as listed above.       Discussed with the patient and all questioned fully answered. She will contact me with concerns.    Desi Aguilar

## 2024-11-06 ENCOUNTER — TELEPHONE (OUTPATIENT)
Age: 59
End: 2024-11-06

## 2024-11-06 RX ORDER — CLONIDINE HYDROCHLORIDE 0.1 MG/1
0.1 TABLET ORAL EVERY 12 HOURS
Qty: 60 TABLET | Refills: 0 | Status: SHIPPED | OUTPATIENT
Start: 2024-11-06

## 2024-11-06 NOTE — TELEPHONE ENCOUNTER
PA for Semaglutide, 2 mg/dose, (Ozempic, 2 MG/DOSE,) 8 mg/ mL injection pen SUBMITTED to [facility]    via      [x][employer]-Case ID # [id number]    Office notes sent, clinical questions answered. Awaiting determination    Turnaround time for your insurance to make a decision on your Prior Authorization can take 7-21 business days.

## 2024-11-07 NOTE — TELEPHONE ENCOUNTER
PA for Ozempic, 2 MG/DOSE  APPROVED     Date(s) approved October 7, 2024 to November 6, 2025     Case #[id number]    Patient advised by          []Pirate Brandshart Message  []Phone call   [x]LMOM  []L/M to call office as no active Communication consent on file  []Unable to leave detailed message as VM not approved on Communication consent       Pharmacy advised by    [x]Fax  []Phone call    Approval letter scanned into Media Yes

## 2024-11-08 LAB — 1,25(OH)2D SERPL-MCNC: 40.4 PG/ML (ref 5–200)

## 2024-12-08 DIAGNOSIS — I10 ESSENTIAL HYPERTENSION: ICD-10-CM

## 2024-12-09 ENCOUNTER — OFFICE VISIT (OUTPATIENT)
Dept: FAMILY MEDICINE CLINIC | Facility: CLINIC | Age: 59
End: 2024-12-09

## 2024-12-09 VITALS
RESPIRATION RATE: 18 BRPM | HEART RATE: 64 BPM | TEMPERATURE: 97.7 F | BODY MASS INDEX: 30.91 KG/M2 | DIASTOLIC BLOOD PRESSURE: 96 MMHG | OXYGEN SATURATION: 98 % | SYSTOLIC BLOOD PRESSURE: 148 MMHG | WEIGHT: 169 LBS

## 2024-12-09 DIAGNOSIS — B34.9 VIRAL INFECTION, UNSPECIFIED: ICD-10-CM

## 2024-12-09 DIAGNOSIS — I10 ESSENTIAL HYPERTENSION: Primary | ICD-10-CM

## 2024-12-09 PROCEDURE — 99213 OFFICE O/P EST LOW 20 MIN: CPT | Performed by: FAMILY MEDICINE

## 2024-12-09 RX ORDER — LISINOPRIL AND HYDROCHLOROTHIAZIDE 20; 25 MG/1; MG/1
1 TABLET ORAL DAILY
Qty: 90 TABLET | Refills: 0 | Status: SHIPPED | OUTPATIENT
Start: 2024-12-09

## 2024-12-09 RX ORDER — FLUTICASONE PROPIONATE 50 MCG
1 SPRAY, SUSPENSION (ML) NASAL DAILY
Qty: 11.1 ML | Refills: 1 | Status: SHIPPED | OUTPATIENT
Start: 2024-12-09

## 2024-12-09 NOTE — ASSESSMENT & PLAN NOTE
Goal bp <140/90, pt's bp 150/88 and on repeat 148/96 - currently above goal  -Martha states that she has been taking her clonidine 0.1 mg only at night, upon inquiring about when dosing switched from twice daily to daily she was unsure.  Recommended starting clonidine twice daily dosing again and taking blood pressures sometime around midday and recording them daily.  She is going to follow-up in 1 week for blood pressure recheck

## 2024-12-09 NOTE — PROGRESS NOTES
Name: Martha Rausch      : 1965      MRN: [id number]  Encounter Provider: Cele Mi DO  Encounter Date: 2024   Encounter department: [facility] [place]  :  Assessment & Plan  Essential hypertension  Goal bp <140/90, pt's bp 150/88 and on repeat 148/96 - currently above goal  -Martha states that she has been taking her clonidine 0.1 mg only at night, upon inquiring about when dosing switched from twice daily to daily she was unsure.  Recommended starting clonidine twice daily dosing again and taking blood pressures sometime around midday and recording them daily.  She is going to follow-up in 1 week for blood pressure recheck           Viral infection, unspecified    Orders:    fluticasone (FLONASE) 50 mcg/act nasal spray; 1 spray into each nostril daily        BMI Counseling: Body mass index is 30.91 kg/m². The BMI is above normal. Nutrition recommendations include consuming healthier snacks, limiting drinks that contain sugar, moderation in carbohydrate intake and increasing intake of lean protein. Exercise recommendations include moderate physical activity 150 minutes/week. No pharmacotherapy was ordered. Patient referred to PCP. Rationale for BMI follow-up plan is due to patient being overweight or obese.       History of Present Illness     HPI  Martha is a very pleasant 59-year-old female who presents today for blood pressure check.  She was previously on lisinopril hydrochlorothiazide combo pill as well as clonidine 0.1 mg.  Initially her clonidine dosing is written for twice daily dosing however she tells me that she only takes the clonidine at night.  She endorses that she has been getting more frequent headaches and has noticed that her blood pressure has been more elevated at doctor's visits.  She does not take her blood pressure regularly at home.    Review of Systems   Constitutional:  Negative for chills and fever.   HENT:  Negative for ear  pain and sore throat.    Eyes:  Negative for pain and visual disturbance.   Respiratory:  Negative for cough and shortness of breath.    Cardiovascular:  Negative for chest pain and palpitations.   Gastrointestinal:  Negative for abdominal pain and vomiting.   Genitourinary:  Negative for dysuria and hematuria.   Musculoskeletal:  Negative for arthralgias and back pain.   Skin:  Negative for color change and rash.   Neurological:  Positive for headaches. Negative for seizures and syncope.   All other systems reviewed and are negative.         Objective   /96   Pulse 64   Temp 97.7 °F (36.5 °C) (Temporal)   Resp 18   Wt 76.7 kg (169 lb)   LMP 12/12/2019 (Exact Date)   SpO2 98%   BMI 30.91 kg/m²      Physical Exam  Vitals and nursing note reviewed.   Constitutional:       General: She is not in acute distress.     Appearance: She is well-developed.   HENT:      Head: Normocephalic and atraumatic.   Eyes:      Conjunctiva/sclera: Conjunctivae normal.   Cardiovascular:      Rate and Rhythm: Normal rate and regular rhythm.      Heart sounds: No murmur heard.     No friction rub. No gallop.   Pulmonary:      Effort: Pulmonary effort is normal. No respiratory distress.      Breath sounds: Normal breath sounds.   Abdominal:      Palpations: Abdomen is soft.      Tenderness: There is no abdominal tenderness.   Musculoskeletal:         General: No swelling.      Cervical back: Neck supple.   Skin:     General: Skin is warm and dry.      Capillary Refill: Capillary refill takes less than 2 seconds.   Neurological:      Mental Status: She is alert.   Psychiatric:         Mood and Affect: Mood normal.

## 2024-12-15 DIAGNOSIS — E61.1 IRON DEFICIENCY: ICD-10-CM

## 2024-12-16 RX ORDER — FERROUS GLUCONATE 324(38)MG
1 TABLET ORAL
Qty: 60 TABLET | Refills: 5 | Status: SHIPPED | OUTPATIENT
Start: 2024-12-16

## 2025-01-25 DIAGNOSIS — R10.13 DYSPEPSIA: ICD-10-CM

## 2025-01-27 RX ORDER — PANTOPRAZOLE SODIUM 40 MG/1
40 TABLET, DELAYED RELEASE ORAL DAILY
Qty: 90 TABLET | Refills: 1 | Status: SHIPPED | OUTPATIENT
Start: 2025-01-27

## 2025-02-18 DIAGNOSIS — E78.2 MIXED HYPERLIPIDEMIA: ICD-10-CM

## 2025-02-19 RX ORDER — ATORVASTATIN CALCIUM 20 MG/1
20 TABLET, FILM COATED ORAL DAILY
Qty: 90 TABLET | Refills: 1 | Status: SHIPPED | OUTPATIENT
Start: 2025-02-19

## 2025-02-26 ENCOUNTER — APPOINTMENT (OUTPATIENT)
Dept: LAB | Facility: CLINIC | Age: 60
End: 2025-02-26
Payer: COMMERCIAL

## 2025-02-26 DIAGNOSIS — E11.65 TYPE 2 DIABETES MELLITUS WITH HYPERGLYCEMIA, WITHOUT LONG-TERM CURRENT USE OF INSULIN (HCC): ICD-10-CM

## 2025-02-26 LAB
CREAT UR-MCNC: 193.2 MG/DL
EST. AVERAGE GLUCOSE BLD GHB EST-MCNC: 131 MG/DL
HBA1C MFR BLD: 6.2 %
MICROALBUMIN UR-MCNC: 11.3 MG/L
MICROALBUMIN/CREAT 24H UR: 6 MG/G CREATININE (ref 0–30)

## 2025-02-26 PROCEDURE — 82570 ASSAY OF URINE CREATININE: CPT

## 2025-02-26 PROCEDURE — 36415 COLL VENOUS BLD VENIPUNCTURE: CPT

## 2025-02-26 PROCEDURE — 83036 HEMOGLOBIN GLYCOSYLATED A1C: CPT

## 2025-02-26 PROCEDURE — 82043 UR ALBUMIN QUANTITATIVE: CPT

## 2025-02-27 ENCOUNTER — OFFICE VISIT (OUTPATIENT)
Dept: ENDOCRINOLOGY | Facility: CLINIC | Age: 60
End: 2025-02-27
Payer: COMMERCIAL

## 2025-02-27 VITALS
DIASTOLIC BLOOD PRESSURE: 84 MMHG | HEART RATE: 81 BPM | OXYGEN SATURATION: 97 % | TEMPERATURE: 97.5 F | BODY MASS INDEX: 31.28 KG/M2 | HEIGHT: 62 IN | WEIGHT: 170 LBS | SYSTOLIC BLOOD PRESSURE: 126 MMHG

## 2025-02-27 DIAGNOSIS — E66.9 OBESITY (BMI 30-39.9): ICD-10-CM

## 2025-02-27 DIAGNOSIS — Z79.4 TYPE 2 DIABETES MELLITUS WITH HYPERGLYCEMIA, WITH LONG-TERM CURRENT USE OF INSULIN (HCC): Primary | ICD-10-CM

## 2025-02-27 DIAGNOSIS — E78.2 MIXED HYPERLIPIDEMIA: ICD-10-CM

## 2025-02-27 DIAGNOSIS — E55.9 VITAMIN D DEFICIENCY: ICD-10-CM

## 2025-02-27 DIAGNOSIS — E11.65 TYPE 2 DIABETES MELLITUS WITH HYPERGLYCEMIA, WITH LONG-TERM CURRENT USE OF INSULIN (HCC): Primary | ICD-10-CM

## 2025-02-27 PROCEDURE — 99214 OFFICE O/P EST MOD 30 MIN: CPT | Performed by: INTERNAL MEDICINE

## 2025-02-27 NOTE — ASSESSMENT & PLAN NOTE
She is stable but increased 3 lbs in spite of Ozempic.  Admits due to holiday related diet changes.    Today we agreed to start medical fitness training, continue metformin, add Jardiance and continue ozempic 2mg weekly.    Encouraged daily exercise.    Follow up in 3 months.    Lab Results   Component Value Date    HGBA1C 6.2 (H) 02/26/2025

## 2025-02-27 NOTE — PROGRESS NOTES
"    Follow-up Patient Progress Note      CC: Type 2 diabetes     History of Present Illness:   49-year-old female with type 2 diabetes since 2018, HTN, HLD, obesity BMI 33, KIM, mild intermittent asthma, vitamin D deficiency, GERD, diverticulosis of colon, chronic low back pain and abnormal TSH. Last visit was 11/5/2024.     Since last visit, she gained 3 lbs. She feels well.     Home blood glucose monitoring: Checks once or twice a day.  Usually less than 200 mg/dL per     Current meds:  Metformin 1000mg BID  Ozempic 2 mg weekly     Opthamology: Yes  Podiatry: No  vaccination: Yes  Dental: No  Pancreatitis: No     Ace/ARB: Lisinopril  Statin: Lipitor 20  Thyroid issues: Recent elevated TSH.      Physical Exam:  Body mass index is 31.09 kg/m².  /84 (BP Location: Left arm, Patient Position: Sitting, Cuff Size: Standard)   Pulse 81   Temp 97.5 °F (36.4 °C) (Tympanic)   Ht 5' 2\" (1.575 m)   Wt 77.1 kg (170 lb)   LMP 12/12/2019 (Exact Date)   SpO2 97%   BMI 31.09 kg/m²    Vitals:    02/27/25 1034   Weight: 77.1 kg (170 lb)        Physical Exam  Constitutional:       General: She is not in acute distress.     Appearance: She is well-developed.   HENT:      Head: Normocephalic and atraumatic.      Nose: Nose normal.   Eyes:      Conjunctiva/sclera: Conjunctivae normal.   Pulmonary:      Effort: Pulmonary effort is normal.   Abdominal:      General: There is no distension.   Musculoskeletal:      Cervical back: Normal range of motion and neck supple.   Skin:     Findings: No rash.      Comments: No icterus   Neurological:      Mental Status: She is alert and oriented to person, place, and time.         Labs:   Lab Results   Component Value Date    HGBA1C 6.2 (H) 02/26/2025       Lab Results   Component Value Date    TDC2HXLNOZIN 3.697 05/22/2023       Lab Results   Component Value Date    CREATININE 0.73 11/05/2024    CREATININE 0.70 05/22/2023    CREATININE 0.78 02/15/2022    BUN 21 11/05/2024     " 11/05/2015    K 3.7 11/05/2024     11/05/2024    CO2 28 11/05/2024     eGFR   Date Value Ref Range Status   11/05/2024 90 ml/min/1.73sq m Final       Lab Results   Component Value Date    ALT 41 02/15/2022    AST 29 02/15/2022    ALKPHOS 62 02/15/2022    BILITOT 0.13 (L) 11/05/2015       Lab Results   Component Value Date    CHOLESTEROL 228 (H) 11/05/2024    CHOLESTEROL 141 05/22/2023    CHOLESTEROL 237 (H) 02/15/2022     Lab Results   Component Value Date    HDL 63 11/05/2024    HDL 54 05/22/2023    HDL 71 02/15/2022     Lab Results   Component Value Date    TRIG 120 11/05/2024    TRIG 138 05/22/2023    TRIG 165 (H) 02/15/2022     Lab Results   Component Value Date    NONHDLC 165 11/05/2024    NONHDLC 87 05/22/2023    NONHDLC 166 02/15/2022         Assessment/Plan:    1. Type 2 diabetes mellitus with hyperglycemia, with long-term current use of insulin (HCC)  Assessment & Plan:  She is stable but increased 3 lbs in spite of Ozempic.  Admits due to holiday related diet changes.    Today we agreed to start medical fitness training, continue metformin, add Jardiance and continue ozempic 2mg weekly.    Encouraged daily exercise.    Follow up in 3 months.    Lab Results   Component Value Date    HGBA1C 6.2 (H) 02/26/2025     Orders:  -     Empagliflozin (Jardiance) 10 MG TABS tablet; Take 1 tablet (10 mg total) by mouth every morning  2. Mixed hyperlipidemia  Assessment & Plan:  Continue statin therapy.  3. Obesity (BMI 30-39.9)  Assessment & Plan:  Encouraged diet and lifestyle changes.    4. Vitamin D deficiency  Assessment & Plan:  Take vit D3 2000IU daily OTC.        I have spent a total time of  minutes on 02/27/25 in caring for this patient including greater than 50% of this time was spent in counseling/coordination of care as listed above.       Discussed with the patient and all questioned fully answered. She will contact me with concerns.    Desi Aguilar

## 2025-03-06 DIAGNOSIS — I10 ESSENTIAL HYPERTENSION: ICD-10-CM

## 2025-03-06 RX ORDER — LISINOPRIL AND HYDROCHLOROTHIAZIDE 20; 25 MG/1; MG/1
1 TABLET ORAL DAILY
Qty: 90 TABLET | Refills: 0 | Status: SHIPPED | OUTPATIENT
Start: 2025-03-06

## 2025-03-15 DIAGNOSIS — I10 ESSENTIAL HYPERTENSION: ICD-10-CM

## 2025-03-17 RX ORDER — LISINOPRIL AND HYDROCHLOROTHIAZIDE 20; 25 MG/1; MG/1
1 TABLET ORAL DAILY
Qty: 90 TABLET | Refills: 0 | Status: SHIPPED | OUTPATIENT
Start: 2025-03-17

## 2025-04-08 NOTE — ASSESSMENT & PLAN NOTE
Unknown current status  Since stopping HCTZ will stop K+ supplement and check metabolic panel after a week  hide

## 2025-04-10 DIAGNOSIS — E11.65 TYPE 2 DIABETES MELLITUS WITH HYPERGLYCEMIA, WITHOUT LONG-TERM CURRENT USE OF INSULIN (HCC): ICD-10-CM

## 2025-04-10 NOTE — TELEPHONE ENCOUNTER
Medication: Ozempic     Dose/Frequency: inject weekly    Quantity: 9 mL    Pharmacy: [facility]    Office:   [] PCP/Provider -   [x] Speciality/Provider - Endo    Does the patient have enough for 3 days?   [] Yes   [x] No - Send as HP to POD

## 2025-04-15 ENCOUNTER — TELEPHONE (OUTPATIENT)
Dept: FAMILY MEDICINE CLINIC | Facility: CLINIC | Age: 60
End: 2025-04-15

## 2025-04-15 DIAGNOSIS — R92.8 ABNORMAL MAMMOGRAM: Primary | ICD-10-CM

## 2025-04-15 NOTE — TELEPHONE ENCOUNTER
"Catarina left message on clinical line stating \"Hi, this is Yates calling from [facility]. I did leave a message yesterday regarding about our mutual patient Martha Rausch, date of birth 1965. She is coming in next week for a diagnostic mammogram and an ultrasound and I still need a script for her. If could give me a call back so that we can get that script over, that'll be great. My callback number is [phone] is the fax number. My callback number is [phone]. I'm so sorry I mixed that up. But the [phone] will be my call back number. The [phone] is the fax number. Thank you. Quinton alvarez.    You received a voice mail from Catarina Tobias at [email or web address].    Job Title: Women's Imaging Aide  Company: [employer]  Work: [phone]  Email: [email or web address]    " Misc

## 2025-04-16 NOTE — TELEPHONE ENCOUNTER
[facility] called asking for new orders to be placed for patients Ultrasound and Mammogram. Original doctor is Dr. Purcell. Please review previous encounter thank you

## 2025-04-22 DIAGNOSIS — I10 ESSENTIAL HYPERTENSION: ICD-10-CM

## 2025-04-23 ENCOUNTER — HOSPITAL ENCOUNTER (OUTPATIENT)
Dept: ULTRASOUND IMAGING | Facility: CLINIC | Age: 60
Discharge: HOME | End: 2025-04-23
Payer: COMMERCIAL

## 2025-04-23 ENCOUNTER — RESULTS FOLLOW-UP (OUTPATIENT)
Dept: FAMILY MEDICINE CLINIC | Facility: CLINIC | Age: 60
End: 2025-04-23

## 2025-04-23 ENCOUNTER — HOSPITAL ENCOUNTER (OUTPATIENT)
Dept: MAMMOGRAPHY | Facility: CLINIC | Age: 60
Discharge: HOME | End: 2025-04-23
Payer: COMMERCIAL

## 2025-04-23 VITALS — HEIGHT: 62 IN | WEIGHT: 170 LBS | BODY MASS INDEX: 31.28 KG/M2

## 2025-04-23 DIAGNOSIS — R92.8 ABNORMAL MAMMOGRAM: ICD-10-CM

## 2025-04-23 DIAGNOSIS — R92.8 ABNORMAL MAMMOGRAM OF LEFT BREAST: Primary | ICD-10-CM

## 2025-04-23 PROCEDURE — 77065 DX MAMMO INCL CAD UNI: CPT

## 2025-04-23 PROCEDURE — G0279 TOMOSYNTHESIS, MAMMO: HCPCS

## 2025-04-23 PROCEDURE — 76642 ULTRASOUND BREAST LIMITED: CPT

## 2025-04-24 RX ORDER — DILTIAZEM HYDROCHLORIDE 120 MG/1
120 CAPSULE, EXTENDED RELEASE ORAL EVERY MORNING
Qty: 90 CAPSULE | Refills: 3 | Status: SHIPPED | OUTPATIENT
Start: 2025-04-24

## 2025-04-28 DIAGNOSIS — E11.65 TYPE 2 DIABETES MELLITUS WITH HYPERGLYCEMIA, WITHOUT LONG-TERM CURRENT USE OF INSULIN (HCC): ICD-10-CM

## 2025-04-29 RX ORDER — METFORMIN HYDROCHLORIDE 500 MG/1
1000 TABLET, EXTENDED RELEASE ORAL 2 TIMES DAILY WITH MEALS
Qty: 360 TABLET | Refills: 1 | Status: SHIPPED | OUTPATIENT
Start: 2025-04-29 | End: 2025-10-26

## 2025-05-21 DIAGNOSIS — Z79.4 TYPE 2 DIABETES MELLITUS WITH HYPERGLYCEMIA, WITH LONG-TERM CURRENT USE OF INSULIN (HCC): ICD-10-CM

## 2025-05-21 DIAGNOSIS — E11.65 TYPE 2 DIABETES MELLITUS WITH HYPERGLYCEMIA, WITH LONG-TERM CURRENT USE OF INSULIN (HCC): ICD-10-CM

## 2025-05-21 DIAGNOSIS — E11.65 TYPE 2 DIABETES MELLITUS WITH HYPERGLYCEMIA, WITHOUT LONG-TERM CURRENT USE OF INSULIN (HCC): ICD-10-CM

## 2025-05-22 RX ORDER — METFORMIN HYDROCHLORIDE 500 MG/1
1000 TABLET, EXTENDED RELEASE ORAL 2 TIMES DAILY WITH MEALS
Qty: 360 TABLET | Refills: 1 | Status: SHIPPED | OUTPATIENT
Start: 2025-05-22 | End: 2025-11-18

## 2025-06-23 ENCOUNTER — OFFICE VISIT (OUTPATIENT)
Dept: ENDOCRINOLOGY | Facility: CLINIC | Age: 60
End: 2025-06-23
Payer: COMMERCIAL

## 2025-06-23 VITALS
DIASTOLIC BLOOD PRESSURE: 76 MMHG | BODY MASS INDEX: 30.73 KG/M2 | HEART RATE: 69 BPM | RESPIRATION RATE: 12 BRPM | OXYGEN SATURATION: 95 % | HEIGHT: 62 IN | WEIGHT: 167 LBS | TEMPERATURE: 97.9 F | SYSTOLIC BLOOD PRESSURE: 104 MMHG

## 2025-06-23 DIAGNOSIS — E11.65 TYPE 2 DIABETES MELLITUS WITH HYPERGLYCEMIA, WITHOUT LONG-TERM CURRENT USE OF INSULIN (HCC): ICD-10-CM

## 2025-06-23 DIAGNOSIS — Z79.4 TYPE 2 DIABETES MELLITUS WITH HYPERGLYCEMIA, WITH LONG-TERM CURRENT USE OF INSULIN (HCC): Primary | ICD-10-CM

## 2025-06-23 DIAGNOSIS — E55.9 VITAMIN D DEFICIENCY: ICD-10-CM

## 2025-06-23 DIAGNOSIS — E11.65 TYPE 2 DIABETES MELLITUS WITH HYPERGLYCEMIA, WITH LONG-TERM CURRENT USE OF INSULIN (HCC): Primary | ICD-10-CM

## 2025-06-23 DIAGNOSIS — E78.2 MIXED HYPERLIPIDEMIA: ICD-10-CM

## 2025-06-23 DIAGNOSIS — E66.9 OBESITY (BMI 30-39.9): ICD-10-CM

## 2025-06-23 PROCEDURE — 99214 OFFICE O/P EST MOD 30 MIN: CPT | Performed by: INTERNAL MEDICINE

## 2025-06-23 RX ORDER — METFORMIN HYDROCHLORIDE 500 MG/1
1000 TABLET, EXTENDED RELEASE ORAL 2 TIMES DAILY WITH MEALS
Qty: 360 TABLET | Refills: 1 | Status: SHIPPED | OUTPATIENT
Start: 2025-06-23 | End: 2025-12-20

## 2025-06-23 NOTE — PROGRESS NOTES
"    Follow-up Patient Progress Note      CC: Type 2 diabetes     History of Present Illness:   49-year-old female with type 2 diabetes since 2018, HTN, HLD, obesity BMI 33, KIM, mild intermittent asthma, vitamin D deficiency, GERD, diverticulosis of colon, chronic low back pain and abnormal TSH. Last visit was 2/27/2025.     Since last visit, she lost 3 lbs. She feels well.     Home blood glucose monitoring: Checks once or twice a day.  Usually less than 200 mg/dL per     Current meds:  Metformin 1000mg BID  Ozempic 2 mg weekly     Opthamology: Yes  Podiatry: No  vaccination: Yes  Dental: No  Pancreatitis: No     Ace/ARB: Lisinopril  Statin: Lipitor 20  Thyroid issues: Recent elevated TSH.      Physical Exam:  Body mass index is 30.54 kg/m².  /76 (BP Location: Left arm, Patient Position: Sitting)   Pulse 69   Temp 97.9 °F (36.6 °C) (Temporal)   Resp 12   Ht 5' 2\" (1.575 m)   Wt 75.8 kg (167 lb)   LMP 12/12/2019 (Exact Date)   SpO2 95%   BMI 30.54 kg/m²    Vitals:    06/23/25 1342   Weight: 75.8 kg (167 lb)        Physical Exam  Vitals reviewed.   Constitutional:       General: She is not in acute distress.     Appearance: She is well-developed.   HENT:      Head: Normocephalic and atraumatic.      Nose: Nose normal.     Eyes:      Conjunctiva/sclera: Conjunctivae normal.      Pupils: Pupils are equal, round, and reactive to light.     Pulmonary:      Effort: Pulmonary effort is normal.   Abdominal:      General: There is no distension.     Musculoskeletal:         General: No deformity.      Cervical back: Normal range of motion and neck supple.     Skin:     Findings: No rash.      Comments: No icterus     Neurological:      Mental Status: She is alert and oriented to person, place, and time.         Labs:   Lab Results   Component Value Date    HGBA1C 6.2 (H) 02/26/2025       Lab Results   Component Value Date    AUV3XYYAFVSO 3.697 05/22/2023       Lab Results   Component Value Date    CREATININE " 0.73 11/05/2024    CREATININE 0.70 05/22/2023    CREATININE 0.78 02/15/2022    BUN 21 11/05/2024     11/05/2015    K 3.7 11/05/2024     11/05/2024    CO2 28 11/05/2024     eGFR   Date Value Ref Range Status   11/05/2024 90 ml/min/1.73sq m Final       Lab Results   Component Value Date    ALT 41 02/15/2022    AST 29 02/15/2022    ALKPHOS 62 02/15/2022    BILITOT 0.13 (L) 11/05/2015       Lab Results   Component Value Date    CHOLESTEROL 228 (H) 11/05/2024    CHOLESTEROL 141 05/22/2023    CHOLESTEROL 237 (H) 02/15/2022     Lab Results   Component Value Date    HDL 63 11/05/2024    HDL 54 05/22/2023    HDL 71 02/15/2022     Lab Results   Component Value Date    TRIG 120 11/05/2024    TRIG 138 05/22/2023    TRIG 165 (H) 02/15/2022     Lab Results   Component Value Date    NONHDLC 165 11/05/2024    NONHDLC 87 05/22/2023    NONHDLC 166 02/15/2022         Assessment/Plan:    1. Type 2 diabetes mellitus with hyperglycemia, with long-term current use of insulin ([facility])  Assessment & Plan:  She is stable but increased 3 lbs in spite of Ozempic.  Admits due to holiday related diet changes.    Today we agreed to start medical fitness training, continue metformin, add Jardiance and continue ozempic 2mg weekly.    Encouraged daily exercise.    Follow up in 3 months.    Lab Results   Component Value Date    HGBA1C 6.2 (H) 02/26/2025     Orders:  -     Hemoglobin A1C; Future  -     Empagliflozin (Jardiance) 10 MG TABS tablet; Take 1 tablet (10 mg total) by mouth every morning  2. Mixed hyperlipidemia  3. Obesity (BMI 30-39.9)  4. Vitamin D deficiency  5. Type 2 diabetes mellitus with hyperglycemia, without long-term current use of insulin (HCC)  -     semaglutide, 2 mg/dose, (Ozempic, 2 MG/DOSE,) 8 mg/ mL injection pen; Inject 0.75 mL (2 mg total) under the skin every 7 days  -     metFORMIN (GLUCOPHAGE-XR) 500 mg 24 hr tablet; Take 2 tablets (1,000 mg total) by mouth 2 (two) times a day with meals Takes 2 500 mg Tablets  = 1000 mg Twice/day        I have spent a total time of  minutes on 06/23/25 in caring for this patient including greater than 50% of this time was spent in counseling/coordination of care as listed above.       Discussed with the patient and all questioned fully answered. She will contact me with concerns.    Desi Aguilar

## 2025-07-23 DIAGNOSIS — R10.13 DYSPEPSIA: ICD-10-CM

## 2025-07-28 RX ORDER — PANTOPRAZOLE SODIUM 40 MG/1
40 TABLET, DELAYED RELEASE ORAL DAILY
Qty: 90 TABLET | Refills: 0 | Status: SHIPPED | OUTPATIENT
Start: 2025-07-28

## 2025-08-19 DIAGNOSIS — R10.13 DYSPEPSIA: ICD-10-CM

## 2025-08-20 RX ORDER — PANTOPRAZOLE SODIUM 40 MG/1
40 TABLET, DELAYED RELEASE ORAL DAILY
Qty: 90 TABLET | Refills: 1 | OUTPATIENT
Start: 2025-08-20

## (undated) DEVICE — GLOVE INDICATOR PI UNDERGLOVE SZ 8.5 BLUE

## (undated) DEVICE — INTENT TO BE USED WITH SUTURE MATERIAL FOR TISSUE CLOSURE: Brand: RICHARD-ALLAN® NEEDLE 1/2 CIRCLE TAPER

## (undated) DEVICE — GLOVE PI ULTRA TOUCH SZ.7.0

## (undated) DEVICE — GLOVE PI ULTRA TOUCH SZ.8.0

## (undated) DEVICE — MYOSURE SEAL SET

## (undated) DEVICE — MAYO STAND COVER: Brand: CONVERTORS

## (undated) DEVICE — BETHLEHEM UNIVERSAL MINOR VAG: Brand: CARDINAL HEALTH

## (undated) DEVICE — SPONGE STICK WITH PVP-I: Brand: KENDALL

## (undated) DEVICE — GLOVE INDICATOR UNDERGLOVE SZ 6 BLUE

## (undated) DEVICE — 2000CC GUARDIAN II: Brand: GUARDIAN

## (undated) DEVICE — BULB SYRINGE,IRRIGATION WITH PROTECTIVE CAP: Brand: DOVER

## (undated) DEVICE — PREMIUM DRY TRAY LF: Brand: MEDLINE INDUSTRIES, INC.

## (undated) DEVICE — SUT VICRYL 4-0 SH 27 IN J415H

## (undated) DEVICE — BAG DECANTER

## (undated) DEVICE — SUT PDS II 2-0 CT-2 27 IN Z333H

## (undated) DEVICE — SUT VICRYL 0 CT-1 36 IN J946H

## (undated) DEVICE — SPONGE CHERRY 1/2IN

## (undated) DEVICE — PACK FLUENT DISP

## (undated) DEVICE — SCD SEQUENTIAL COMPRESSION COMFORT SLEEVE MEDIUM KNEE LENGTH: Brand: KENDALL SCD

## (undated) DEVICE — SMOKE EVACUATION TUBING WITH 8 IN INTEGRAL WAND AND SPONGE GUARD: Brand: BUFFALO FILTER

## (undated) DEVICE — UNDER BUTTOCKS DRAPE W/FLUID CONTROL POUCH: Brand: CONVERTORS

## (undated) DEVICE — CURITY PLAIN PACKING STRIP: Brand: CURITY

## (undated) DEVICE — TUBING SUCTION 5MM X 12 FT

## (undated) DEVICE — ALLENTOWN DR  LUCENTE S LAP PK: Brand: CARDINAL HEALTH

## (undated) DEVICE — NEEDLE HYPO 22G X 1-1/2 IN

## (undated) DEVICE — VIAL DECANTER

## (undated) DEVICE — MAXI PAD5.51 X 13.78 IN. (14.0 X 35.0 CM)HEAVYCONTOUREDUNSCENTED: Brand: CURITY

## (undated) DEVICE — SUT VICRYL 2-0 SH 27 IN UNDYED J417H

## (undated) DEVICE — GLOVE PI ULTRA TOUCH SZ.8.5

## (undated) DEVICE — SPECIMEN CONTAINER STERILE PEEL PACK

## (undated) DEVICE — GLOVE PI ULTRA TOUCH SZ 6

## (undated) DEVICE — ELECTROSURGICAL DEVICE HOLSTER;FOR USE WITH MAXIMUM PEAK VOLTAGE OF 4000 V: Brand: FORCE TRIVERSE

## (undated) DEVICE — MEDI-VAC YANKAUER SUCTION HANDLE W/BULBOUS AND CONTROL VENT: Brand: CARDINAL HEALTH

## (undated) DEVICE — GLOVE PI ULTRA TOUCH SZ.6.5

## (undated) DEVICE — INTENDED FOR TISSUE SEPARATION, AND OTHER PROCEDURES THAT REQUIRE A SHARP SURGICAL BLADE TO PUNCTURE OR CUT.: Brand: BARD-PARKER SAFETY BLADES SIZE 11, STERILE

## (undated) DEVICE — IV FLUSH NSS 10ML POSIFLUSH

## (undated) DEVICE — GLOVE INDICATOR PI UNDERGLOVE SZ 7 BLUE

## (undated) DEVICE — CATH FOLEY 18FR 5ML 2 WAY UNCOATED SILICONE

## (undated) DEVICE — EXIDINE 4 PCT

## (undated) DEVICE — CAUTERY TIP POLISHER: Brand: DEVON